# Patient Record
Sex: MALE | Race: WHITE | NOT HISPANIC OR LATINO | Employment: OTHER | URBAN - METROPOLITAN AREA
[De-identification: names, ages, dates, MRNs, and addresses within clinical notes are randomized per-mention and may not be internally consistent; named-entity substitution may affect disease eponyms.]

---

## 2018-04-10 ENCOUNTER — TELEPHONE (OUTPATIENT)
Dept: GASTROENTEROLOGY | Facility: CLINIC | Age: 68
End: 2018-04-10

## 2018-04-10 ENCOUNTER — HOSPITAL ENCOUNTER (INPATIENT)
Facility: HOSPITAL | Age: 68
LOS: 9 days | Discharge: HOSPICE | DRG: 374 | End: 2018-04-19
Attending: EMERGENCY MEDICINE | Admitting: INTERNAL MEDICINE
Payer: MEDICARE

## 2018-04-10 DIAGNOSIS — I10 HYPERTENSION, UNSPECIFIED TYPE: ICD-10-CM

## 2018-04-10 DIAGNOSIS — R53.1 WEAKNESS: ICD-10-CM

## 2018-04-10 DIAGNOSIS — I47.10 SVT (SUPRAVENTRICULAR TACHYCARDIA): ICD-10-CM

## 2018-04-10 DIAGNOSIS — Z94.0 S/P LIVING-DONOR KIDNEY TRANSPLANTATION: ICD-10-CM

## 2018-04-10 DIAGNOSIS — E86.0 DEHYDRATION: ICD-10-CM

## 2018-04-10 DIAGNOSIS — I48.91 ATRIAL FIBRILLATION: ICD-10-CM

## 2018-04-10 DIAGNOSIS — I48.0 PAROXYSMAL ATRIAL FIBRILLATION: ICD-10-CM

## 2018-04-10 DIAGNOSIS — R60.0 BILATERAL EDEMA OF LOWER EXTREMITY: ICD-10-CM

## 2018-04-10 DIAGNOSIS — I48.91 A-FIB: ICD-10-CM

## 2018-04-10 DIAGNOSIS — I35.9 NONRHEUMATIC AORTIC VALVE DISORDER: ICD-10-CM

## 2018-04-10 DIAGNOSIS — C15.9 MALIGNANT NEOPLASM OF ESOPHAGUS, UNSPECIFIED LOCATION: Primary | ICD-10-CM

## 2018-04-10 DIAGNOSIS — I49.9 ABNORMAL HEART RHYTHM: ICD-10-CM

## 2018-04-10 DIAGNOSIS — K22.2 ESOPHAGEAL OBSTRUCTION: Primary | ICD-10-CM

## 2018-04-10 DIAGNOSIS — R00.0 TACHYCARDIA: ICD-10-CM

## 2018-04-10 DIAGNOSIS — Z29.89 PROPHYLACTIC IMMUNOTHERAPY: ICD-10-CM

## 2018-04-10 DIAGNOSIS — Z94.0 S/P KIDNEY TRANSPLANT: ICD-10-CM

## 2018-04-10 DIAGNOSIS — Z86.718 HISTORY OF DVT (DEEP VEIN THROMBOSIS): ICD-10-CM

## 2018-04-10 DIAGNOSIS — R10.9 ABDOMINAL PAIN, UNSPECIFIED ABDOMINAL LOCATION: ICD-10-CM

## 2018-04-10 DIAGNOSIS — K22.4 ESOPHAGEAL SPASM: ICD-10-CM

## 2018-04-10 DIAGNOSIS — R10.9 ABDOMINAL PAIN: ICD-10-CM

## 2018-04-10 DIAGNOSIS — E87.6 HYPOKALEMIA: ICD-10-CM

## 2018-04-10 DIAGNOSIS — E83.39 HYPOPHOSPHATEMIA: ICD-10-CM

## 2018-04-10 DIAGNOSIS — N18.2 CHRONIC KIDNEY DISEASE (CKD), STAGE II (MILD): Chronic | ICD-10-CM

## 2018-04-10 DIAGNOSIS — C16.9 GASTRIC CARCINOMA: ICD-10-CM

## 2018-04-10 DIAGNOSIS — I10 ESSENTIAL HYPERTENSION: ICD-10-CM

## 2018-04-10 DIAGNOSIS — E83.42 HYPOMAGNESEMIA: ICD-10-CM

## 2018-04-10 LAB
ALBUMIN SERPL BCP-MCNC: 2.9 G/DL
ALP SERPL-CCNC: 47 U/L
ALT SERPL W/O P-5'-P-CCNC: 16 U/L
AMYLASE SERPL-CCNC: 37 U/L
ANION GAP SERPL CALC-SCNC: 16 MMOL/L
AST SERPL-CCNC: 25 U/L
BASOPHILS # BLD AUTO: 0.02 K/UL
BASOPHILS NFR BLD: 0.4 %
BILIRUB SERPL-MCNC: 1.5 MG/DL
BNP SERPL-MCNC: 65 PG/ML
BUN SERPL-MCNC: 14 MG/DL
CALCIUM SERPL-MCNC: 9 MG/DL
CHLORIDE SERPL-SCNC: 98 MMOL/L
CO2 SERPL-SCNC: 24 MMOL/L
CREAT SERPL-MCNC: 0.9 MG/DL
DIFFERENTIAL METHOD: ABNORMAL
EOSINOPHIL # BLD AUTO: 0 K/UL
EOSINOPHIL NFR BLD: 0.2 %
ERYTHROCYTE [DISTWIDTH] IN BLOOD BY AUTOMATED COUNT: 17.7 %
EST. GFR  (AFRICAN AMERICAN): >60 ML/MIN/1.73 M^2
EST. GFR  (NON AFRICAN AMERICAN): >60 ML/MIN/1.73 M^2
GLUCOSE SERPL-MCNC: 111 MG/DL
HCT VFR BLD AUTO: 33.1 %
HGB BLD-MCNC: 11.2 G/DL
IMM GRANULOCYTES # BLD AUTO: 0.05 K/UL
IMM GRANULOCYTES NFR BLD AUTO: 1 %
INR PPP: 1.1
LACTATE SERPL-SCNC: 2.1 MMOL/L
LIPASE SERPL-CCNC: 43 U/L
LYMPHOCYTES # BLD AUTO: 0.9 K/UL
LYMPHOCYTES NFR BLD: 17.1 %
MCH RBC QN AUTO: 31.8 PG
MCHC RBC AUTO-ENTMCNC: 33.8 G/DL
MCV RBC AUTO: 94 FL
MONOCYTES # BLD AUTO: 0.1 K/UL
MONOCYTES NFR BLD: 2.5 %
NEUTROPHILS # BLD AUTO: 4.1 K/UL
NEUTROPHILS NFR BLD: 78.8 %
NRBC BLD-RTO: 1 /100 WBC
PLATELET # BLD AUTO: 298 K/UL
PMV BLD AUTO: 9.4 FL
POTASSIUM SERPL-SCNC: 3.8 MMOL/L
PROT SERPL-MCNC: 6.4 G/DL
PROTHROMBIN TIME: 11.7 SEC
RBC # BLD AUTO: 3.52 M/UL
SODIUM SERPL-SCNC: 138 MMOL/L
WBC # BLD AUTO: 5.2 K/UL

## 2018-04-10 PROCEDURE — 80053 COMPREHEN METABOLIC PANEL: CPT

## 2018-04-10 PROCEDURE — 25000003 PHARM REV CODE 250: Performed by: PHYSICIAN ASSISTANT

## 2018-04-10 PROCEDURE — 85610 PROTHROMBIN TIME: CPT

## 2018-04-10 PROCEDURE — 96374 THER/PROPH/DIAG INJ IV PUSH: CPT | Mod: 59

## 2018-04-10 PROCEDURE — 25000003 PHARM REV CODE 250: Performed by: EMERGENCY MEDICINE

## 2018-04-10 PROCEDURE — 11000001 HC ACUTE MED/SURG PRIVATE ROOM

## 2018-04-10 PROCEDURE — 85025 COMPLETE CBC W/AUTO DIFF WBC: CPT

## 2018-04-10 PROCEDURE — 99285 EMERGENCY DEPT VISIT HI MDM: CPT | Mod: 25

## 2018-04-10 PROCEDURE — 83605 ASSAY OF LACTIC ACID: CPT

## 2018-04-10 PROCEDURE — 63600175 PHARM REV CODE 636 W HCPCS: Performed by: PHYSICIAN ASSISTANT

## 2018-04-10 PROCEDURE — 93005 ELECTROCARDIOGRAM TRACING: CPT

## 2018-04-10 PROCEDURE — 83690 ASSAY OF LIPASE: CPT

## 2018-04-10 PROCEDURE — 83880 ASSAY OF NATRIURETIC PEPTIDE: CPT

## 2018-04-10 PROCEDURE — 93010 ELECTROCARDIOGRAM REPORT: CPT | Mod: ,,, | Performed by: INTERNAL MEDICINE

## 2018-04-10 PROCEDURE — 96361 HYDRATE IV INFUSION ADD-ON: CPT

## 2018-04-10 PROCEDURE — 99223 1ST HOSP IP/OBS HIGH 75: CPT | Mod: AI,,, | Performed by: PHYSICIAN ASSISTANT

## 2018-04-10 PROCEDURE — C9113 INJ PANTOPRAZOLE SODIUM, VIA: HCPCS | Performed by: PHYSICIAN ASSISTANT

## 2018-04-10 PROCEDURE — 87040 BLOOD CULTURE FOR BACTERIA: CPT | Mod: 59

## 2018-04-10 PROCEDURE — 82150 ASSAY OF AMYLASE: CPT

## 2018-04-10 RX ORDER — DEXTROSE MONOHYDRATE, SODIUM CHLORIDE, AND POTASSIUM CHLORIDE 50; .745; 4.5 G/1000ML; G/1000ML; G/1000ML
1000 INJECTION, SOLUTION INTRAVENOUS
Status: COMPLETED | OUTPATIENT
Start: 2018-04-10 | End: 2018-04-10

## 2018-04-10 RX ORDER — IBUPROFEN 200 MG
24 TABLET ORAL
Status: DISCONTINUED | OUTPATIENT
Start: 2018-04-10 | End: 2018-04-19 | Stop reason: HOSPADM

## 2018-04-10 RX ORDER — IBUPROFEN 200 MG
16 TABLET ORAL
Status: DISCONTINUED | OUTPATIENT
Start: 2018-04-10 | End: 2018-04-19 | Stop reason: HOSPADM

## 2018-04-10 RX ORDER — PROMETHAZINE HYDROCHLORIDE 12.5 MG/1
12.5 TABLET ORAL EVERY 6 HOURS PRN
COMMUNITY

## 2018-04-10 RX ORDER — KETOROLAC TROMETHAMINE 30 MG/ML
15 INJECTION, SOLUTION INTRAMUSCULAR; INTRAVENOUS EVERY 6 HOURS PRN
Status: DISCONTINUED | OUTPATIENT
Start: 2018-04-10 | End: 2018-04-10

## 2018-04-10 RX ORDER — AZATHIOPRINE 50 MG/1
50 TABLET ORAL DAILY
COMMUNITY

## 2018-04-10 RX ORDER — IPRATROPIUM BROMIDE AND ALBUTEROL SULFATE 2.5; .5 MG/3ML; MG/3ML
3 SOLUTION RESPIRATORY (INHALATION) EVERY 4 HOURS PRN
Status: DISCONTINUED | OUTPATIENT
Start: 2018-04-10 | End: 2018-04-19 | Stop reason: HOSPADM

## 2018-04-10 RX ORDER — DILTIAZEM HYDROCHLORIDE 60 MG/1
60 TABLET, FILM COATED ORAL 3 TIMES DAILY
COMMUNITY

## 2018-04-10 RX ORDER — ONDANSETRON 4 MG/1
4 TABLET, ORALLY DISINTEGRATING ORAL
Status: COMPLETED | OUTPATIENT
Start: 2018-04-10 | End: 2018-04-10

## 2018-04-10 RX ORDER — ACETAMINOPHEN 650 MG/1
650 SUPPOSITORY RECTAL EVERY 4 HOURS PRN
Status: DISCONTINUED | OUTPATIENT
Start: 2018-04-10 | End: 2018-04-19 | Stop reason: HOSPADM

## 2018-04-10 RX ORDER — ENOXAPARIN SODIUM 100 MG/ML
40 INJECTION SUBCUTANEOUS EVERY 24 HOURS
Status: DISCONTINUED | OUTPATIENT
Start: 2018-04-11 | End: 2018-04-12

## 2018-04-10 RX ORDER — PREDNISONE 5 MG/1
5 TABLET ORAL DAILY
COMMUNITY

## 2018-04-10 RX ORDER — ONDANSETRON 4 MG/1
4 TABLET, ORALLY DISINTEGRATING ORAL EVERY 8 HOURS PRN
Status: DISCONTINUED | OUTPATIENT
Start: 2018-04-10 | End: 2018-04-19 | Stop reason: HOSPADM

## 2018-04-10 RX ORDER — PANTOPRAZOLE SODIUM 40 MG/1
40 TABLET, DELAYED RELEASE ORAL 2 TIMES DAILY
COMMUNITY

## 2018-04-10 RX ORDER — AMOXICILLIN 250 MG
1 CAPSULE ORAL 2 TIMES DAILY PRN
Status: DISCONTINUED | OUTPATIENT
Start: 2018-04-10 | End: 2018-04-19 | Stop reason: HOSPADM

## 2018-04-10 RX ORDER — SODIUM CHLORIDE, SODIUM LACTATE, POTASSIUM CHLORIDE, CALCIUM CHLORIDE 600; 310; 30; 20 MG/100ML; MG/100ML; MG/100ML; MG/100ML
INJECTION, SOLUTION INTRAVENOUS CONTINUOUS
Status: ACTIVE | OUTPATIENT
Start: 2018-04-11 | End: 2018-04-11

## 2018-04-10 RX ORDER — GLUCAGON 1 MG
1 KIT INJECTION
Status: DISCONTINUED | OUTPATIENT
Start: 2018-04-10 | End: 2018-04-19 | Stop reason: HOSPADM

## 2018-04-10 RX ORDER — SODIUM CHLORIDE 0.9 % (FLUSH) 0.9 %
5 SYRINGE (ML) INJECTION
Status: DISCONTINUED | OUTPATIENT
Start: 2018-04-10 | End: 2018-04-19 | Stop reason: HOSPADM

## 2018-04-10 RX ORDER — LOSARTAN POTASSIUM 50 MG/1
50 TABLET ORAL DAILY
COMMUNITY

## 2018-04-10 RX ADMIN — DEXTROSE 40 MG: 50 INJECTION, SOLUTION INTRAVENOUS at 10:04

## 2018-04-10 RX ADMIN — SODIUM CHLORIDE 1000 ML: 0.9 INJECTION, SOLUTION INTRAVENOUS at 08:04

## 2018-04-10 RX ADMIN — DEXTROSE MONOHYDRATE, SODIUM CHLORIDE, AND POTASSIUM CHLORIDE 1000 ML: 50; 4.5; .745 INJECTION, SOLUTION INTRAVENOUS at 10:04

## 2018-04-10 RX ADMIN — ONDANSETRON 4 MG: 4 TABLET, ORALLY DISINTEGRATING ORAL at 08:04

## 2018-04-10 NOTE — TELEPHONE ENCOUNTER
Message   Received: Today   Message Contents   MD Kyung Mahajan MA   Caller: Unspecified (Today,  1:40 PM)             Patient need EGD/EUS for gastric cancer.   Jean Mireles MD      Please sign order

## 2018-04-10 NOTE — TELEPHONE ENCOUNTER
Spoke with patient. He says he is dehydrated and weak. He spoke with his referring MD and was told to come to the ER here. He is on his way in now

## 2018-04-11 ENCOUNTER — ANESTHESIA EVENT (OUTPATIENT)
Dept: ENDOSCOPY | Facility: HOSPITAL | Age: 68
DRG: 374 | End: 2018-04-11
Payer: MEDICARE

## 2018-04-11 PROBLEM — Z94.0 S/P KIDNEY TRANSPLANT: Status: ACTIVE | Noted: 2018-04-11

## 2018-04-11 PROBLEM — Z29.89 PROPHYLACTIC IMMUNOTHERAPY: Status: ACTIVE | Noted: 2018-04-11

## 2018-04-11 PROBLEM — C16.9 GASTRIC CARCINOMA: Status: ACTIVE | Noted: 2018-04-11

## 2018-04-11 PROBLEM — E87.6 HYPOKALEMIA: Status: ACTIVE | Noted: 2018-04-11

## 2018-04-11 PROBLEM — R53.1 WEAKNESS: Status: ACTIVE | Noted: 2018-04-11

## 2018-04-11 PROBLEM — I82.4Y9 ACUTE DEEP VEIN THROMBOSIS (DVT) OF PROXIMAL VEIN OF LOWER EXTREMITY: Status: ACTIVE | Noted: 2018-04-11

## 2018-04-11 PROBLEM — K22.4 ESOPHAGEAL SPASM: Status: ACTIVE | Noted: 2018-04-11

## 2018-04-11 PROBLEM — I10 HYPERTENSION: Status: ACTIVE | Noted: 2018-04-11

## 2018-04-11 PROBLEM — E83.42 HYPOMAGNESEMIA: Status: ACTIVE | Noted: 2018-04-11

## 2018-04-11 PROBLEM — N18.2 CHRONIC KIDNEY DISEASE (CKD), STAGE II (MILD): Chronic | Status: ACTIVE | Noted: 2018-04-11

## 2018-04-11 PROBLEM — Z86.718 HISTORY OF DVT (DEEP VEIN THROMBOSIS): Status: ACTIVE | Noted: 2018-04-11

## 2018-04-11 LAB
ANION GAP SERPL CALC-SCNC: 12 MMOL/L
AORTIC VALVE REGURGITATION: ABNORMAL
AORTIC VALVE STENOSIS: ABNORMAL
BACTERIA #/AREA URNS AUTO: ABNORMAL /HPF
BASOPHILS # BLD AUTO: 0.02 K/UL
BASOPHILS NFR BLD: 0.5 %
BILIRUB UR QL STRIP: ABNORMAL
BUN SERPL-MCNC: 14 MG/DL
CALCIUM SERPL-MCNC: 8.3 MG/DL
CHLORIDE SERPL-SCNC: 101 MMOL/L
CLARITY UR REFRACT.AUTO: ABNORMAL
CO2 SERPL-SCNC: 25 MMOL/L
COLOR UR AUTO: ABNORMAL
CREAT SERPL-MCNC: 0.7 MG/DL
DIASTOLIC DYSFUNCTION: NO
DIFFERENTIAL METHOD: ABNORMAL
EOSINOPHIL # BLD AUTO: 0.1 K/UL
EOSINOPHIL NFR BLD: 2.5 %
ERYTHROCYTE [DISTWIDTH] IN BLOOD BY AUTOMATED COUNT: 17.5 %
EST. GFR  (AFRICAN AMERICAN): >60 ML/MIN/1.73 M^2
EST. GFR  (NON AFRICAN AMERICAN): >60 ML/MIN/1.73 M^2
ESTIMATED PA SYSTOLIC PRESSURE: 25
GLOBAL PERICARDIAL EFFUSION: ABNORMAL
GLUCOSE SERPL-MCNC: 97 MG/DL
GLUCOSE UR QL STRIP: ABNORMAL
HCT VFR BLD AUTO: 28.3 %
HGB BLD-MCNC: 9.5 G/DL
HGB UR QL STRIP: NEGATIVE
HYALINE CASTS UR QL AUTO: 0 /LPF
IMM GRANULOCYTES # BLD AUTO: 0.06 K/UL
IMM GRANULOCYTES NFR BLD AUTO: 1.5 %
KETONES UR QL STRIP: ABNORMAL
LEUKOCYTE ESTERASE UR QL STRIP: NEGATIVE
LYMPHOCYTES # BLD AUTO: 1.1 K/UL
LYMPHOCYTES NFR BLD: 27.5 %
MAGNESIUM SERPL-MCNC: 1.3 MG/DL
MCH RBC QN AUTO: 31.8 PG
MCHC RBC AUTO-ENTMCNC: 33.6 G/DL
MCV RBC AUTO: 95 FL
MICROSCOPIC COMMENT: ABNORMAL
MITRAL VALVE MOBILITY: NORMAL
MONOCYTES # BLD AUTO: 0.1 K/UL
MONOCYTES NFR BLD: 2.9 %
NEUTROPHILS # BLD AUTO: 2.7 K/UL
NEUTROPHILS NFR BLD: 65.1 %
NITRITE UR QL STRIP: NEGATIVE
NRBC BLD-RTO: 0 /100 WBC
PH UR STRIP: 6 [PH] (ref 5–8)
PHOSPHATE SERPL-MCNC: 3 MG/DL
PLATELET # BLD AUTO: 196 K/UL
PMV BLD AUTO: 9.4 FL
POCT GLUCOSE: 112 MG/DL (ref 70–110)
POTASSIUM SERPL-SCNC: 2.9 MMOL/L
PREALB SERPL-MCNC: 8 MG/DL
PROT UR QL STRIP: ABNORMAL
RBC # BLD AUTO: 2.99 M/UL
RBC #/AREA URNS AUTO: 1 /HPF (ref 0–4)
RETIRED EF AND QEF - SEE NOTES: 60 (ref 55–65)
SODIUM SERPL-SCNC: 138 MMOL/L
SP GR UR STRIP: 1.02 (ref 1–1.03)
TRICUSPID VALVE REGURGITATION: ABNORMAL
URN SPEC COLLECT METH UR: ABNORMAL
UROBILINOGEN UR STRIP-ACNC: 4 EU/DL
WBC # BLD AUTO: 4.08 K/UL
WBC #/AREA URNS AUTO: 4 /HPF (ref 0–5)

## 2018-04-11 PROCEDURE — 99223 1ST HOSP IP/OBS HIGH 75: CPT | Mod: ,,, | Performed by: INTERNAL MEDICINE

## 2018-04-11 PROCEDURE — 84134 ASSAY OF PREALBUMIN: CPT

## 2018-04-11 PROCEDURE — 80048 BASIC METABOLIC PNL TOTAL CA: CPT

## 2018-04-11 PROCEDURE — 36415 COLL VENOUS BLD VENIPUNCTURE: CPT

## 2018-04-11 PROCEDURE — 93306 TTE W/DOPPLER COMPLETE: CPT

## 2018-04-11 PROCEDURE — 81001 URINALYSIS AUTO W/SCOPE: CPT

## 2018-04-11 PROCEDURE — 63600175 PHARM REV CODE 636 W HCPCS: Performed by: HOSPITALIST

## 2018-04-11 PROCEDURE — 25500020 PHARM REV CODE 255: Performed by: STUDENT IN AN ORGANIZED HEALTH CARE EDUCATION/TRAINING PROGRAM

## 2018-04-11 PROCEDURE — 93306 TTE W/DOPPLER COMPLETE: CPT | Mod: 26,,, | Performed by: INTERNAL MEDICINE

## 2018-04-11 PROCEDURE — 84100 ASSAY OF PHOSPHORUS: CPT

## 2018-04-11 PROCEDURE — 11000001 HC ACUTE MED/SURG PRIVATE ROOM

## 2018-04-11 PROCEDURE — 85025 COMPLETE CBC W/AUTO DIFF WBC: CPT

## 2018-04-11 PROCEDURE — C9113 INJ PANTOPRAZOLE SODIUM, VIA: HCPCS | Performed by: PHYSICIAN ASSISTANT

## 2018-04-11 PROCEDURE — 87086 URINE CULTURE/COLONY COUNT: CPT

## 2018-04-11 PROCEDURE — 99231 SBSQ HOSP IP/OBS SF/LOW 25: CPT | Mod: ,,, | Performed by: NURSE PRACTITIONER

## 2018-04-11 PROCEDURE — 99223 1ST HOSP IP/OBS HIGH 75: CPT | Mod: GC,,, | Performed by: INTERNAL MEDICINE

## 2018-04-11 PROCEDURE — 25000003 PHARM REV CODE 250: Performed by: PHYSICIAN ASSISTANT

## 2018-04-11 PROCEDURE — 99233 SBSQ HOSP IP/OBS HIGH 50: CPT | Mod: ,,, | Performed by: HOSPITALIST

## 2018-04-11 PROCEDURE — 63600175 PHARM REV CODE 636 W HCPCS: Performed by: PHYSICIAN ASSISTANT

## 2018-04-11 PROCEDURE — 25500020 PHARM REV CODE 255: Performed by: INTERNAL MEDICINE

## 2018-04-11 PROCEDURE — 83735 ASSAY OF MAGNESIUM: CPT

## 2018-04-11 PROCEDURE — 25000003 PHARM REV CODE 250: Performed by: HOSPITALIST

## 2018-04-11 RX ORDER — AZATHIOPRINE 50 MG/1
150 TABLET ORAL DAILY
Status: DISCONTINUED | OUTPATIENT
Start: 2018-04-11 | End: 2018-04-19 | Stop reason: HOSPADM

## 2018-04-11 RX ORDER — POTASSIUM CHLORIDE 7.45 MG/ML
10 INJECTION INTRAVENOUS
Status: DISCONTINUED | OUTPATIENT
Start: 2018-04-11 | End: 2018-04-11

## 2018-04-11 RX ORDER — POTASSIUM CHLORIDE 7.45 MG/ML
10 INJECTION INTRAVENOUS
Status: COMPLETED | OUTPATIENT
Start: 2018-04-11 | End: 2018-04-11

## 2018-04-11 RX ORDER — MAGNESIUM SULFATE HEPTAHYDRATE 40 MG/ML
2 INJECTION, SOLUTION INTRAVENOUS ONCE
Status: COMPLETED | OUTPATIENT
Start: 2018-04-11 | End: 2018-04-11

## 2018-04-11 RX ORDER — MORPHINE SULFATE ORAL SOLUTION 10 MG/5ML
5 SOLUTION ORAL EVERY 4 HOURS PRN
Status: DISCONTINUED | OUTPATIENT
Start: 2018-04-11 | End: 2018-04-19 | Stop reason: HOSPADM

## 2018-04-11 RX ORDER — BISACODYL 10 MG
10 SUPPOSITORY, RECTAL RECTAL DAILY PRN
Status: DISCONTINUED | OUTPATIENT
Start: 2018-04-11 | End: 2018-04-19 | Stop reason: HOSPADM

## 2018-04-11 RX ORDER — ACETAMINOPHEN 10 MG/ML
1000 INJECTION, SOLUTION INTRAVENOUS EVERY 12 HOURS
Status: COMPLETED | OUTPATIENT
Start: 2018-04-11 | End: 2018-04-12

## 2018-04-11 RX ADMIN — MORPHINE SULFATE 5 MG: 10 SOLUTION ORAL at 08:04

## 2018-04-11 RX ADMIN — POTASSIUM CHLORIDE 10 MEQ: 7.46 INJECTION, SOLUTION INTRAVENOUS at 08:04

## 2018-04-11 RX ADMIN — POTASSIUM CHLORIDE 10 MEQ: 7.46 INJECTION, SOLUTION INTRAVENOUS at 10:04

## 2018-04-11 RX ADMIN — ONDANSETRON 4 MG: 4 TABLET, ORALLY DISINTEGRATING ORAL at 03:04

## 2018-04-11 RX ADMIN — SODIUM CHLORIDE, SODIUM LACTATE, POTASSIUM CHLORIDE, AND CALCIUM CHLORIDE: 600; 310; 30; 20 INJECTION, SOLUTION INTRAVENOUS at 12:04

## 2018-04-11 RX ADMIN — DEXTROSE 40 MG: 50 INJECTION, SOLUTION INTRAVENOUS at 09:04

## 2018-04-11 RX ADMIN — DEXTROSE 40 MG: 50 INJECTION, SOLUTION INTRAVENOUS at 10:04

## 2018-04-11 RX ADMIN — METHYLPREDNISOLONE SODIUM SUCCINATE 10 MG: 40 INJECTION, POWDER, FOR SOLUTION INTRAMUSCULAR; INTRAVENOUS at 09:04

## 2018-04-11 RX ADMIN — IOHEXOL 15 ML: 350 INJECTION, SOLUTION INTRAVENOUS at 12:04

## 2018-04-11 RX ADMIN — POTASSIUM CHLORIDE 10 MEQ: 7.46 INJECTION, SOLUTION INTRAVENOUS at 11:04

## 2018-04-11 RX ADMIN — ACETAMINOPHEN 1000 MG: 10 INJECTION, SOLUTION INTRAVENOUS at 10:04

## 2018-04-11 RX ADMIN — IOHEXOL 100 ML: 350 INJECTION, SOLUTION INTRAVENOUS at 02:04

## 2018-04-11 RX ADMIN — MAGNESIUM SULFATE IN WATER 2 G: 40 INJECTION, SOLUTION INTRAVENOUS at 01:04

## 2018-04-11 RX ADMIN — AZATHIOPRINE 150 MG: 50 TABLET ORAL at 08:04

## 2018-04-11 NOTE — SUBJECTIVE & OBJECTIVE
Past Medical History:   Diagnosis Date    Cancer     stomach    Hypertension     Renal disorder 1984    bilat kidney transplant        Past Surgical History:   Procedure Laterality Date    APPENDECTOMY      w cancer tumor removal    CHOLECYSTECTOMY      COLON SURGERY      removed during appendectomy to remove tumors    KIDNEY TRANSPLANT Bilateral 1984       Review of patient's allergies indicates:   Allergen Reactions    Allopurinol analogues      Family History     None        Social History Main Topics    Smoking status: Never Smoker    Smokeless tobacco: Never Used    Alcohol use No    Drug use: No    Sexual activity: No     Review of Systems   Constitutional: Positive for fatigue and unexpected weight change. Negative for chills, diaphoresis and fever.   HENT: Negative for trouble swallowing and voice change.    Respiratory: Positive for choking. Negative for cough and shortness of breath.    Cardiovascular: Negative for chest pain and leg swelling.   Gastrointestinal: Positive for abdominal pain. Negative for abdominal distention, anal bleeding, blood in stool, constipation, diarrhea, nausea, rectal pain and vomiting.   Genitourinary: Negative for dysuria and flank pain.   Musculoskeletal: Negative.    Skin: Negative for color change and pallor.   Neurological: Positive for dizziness and light-headedness. Negative for tremors.   Psychiatric/Behavioral: Negative for confusion and hallucinations.     Objective:     Vital Signs (Most Recent):  Temp: 96.3 °F (35.7 °C) (04/11/18 0430)  Pulse: 71 (04/11/18 0430)  Resp: 16 (04/11/18 0430)  BP: (!) 144/79 (04/11/18 0430)  SpO2: 97 % (04/11/18 0430) Vital Signs (24h Range):  Temp:  [96.3 °F (35.7 °C)-98.1 °F (36.7 °C)] 96.3 °F (35.7 °C)  Pulse:  [71-97] 71  Resp:  [14-18] 16  SpO2:  [97 %-99 %] 97 %  BP: ()/(56-87) 144/79     Weight: 108.4 kg (239 lb) (04/10/18 3448)  Body mass index is 37.43 kg/m².      Intake/Output Summary (Last 24 hours) at  04/11/18 0926  Last data filed at 04/11/18 0452   Gross per 24 hour   Intake             1650 ml   Output              250 ml   Net             1400 ml       Lines/Drains/Airways     Peripheral Intravenous Line                 External Jugular IV 04/10/18 2004 less than 1 day         Peripheral IV - Single Lumen 04/10/18 2003 Left Hand less than 1 day                Physical Exam   Constitutional: He is oriented to person, place, and time. No distress.   Eyes: Conjunctivae are normal. No scleral icterus.   Neck: Neck supple. No tracheal deviation present.   Cardiovascular: Normal rate, regular rhythm and normal heart sounds.    Pulmonary/Chest: Effort normal and breath sounds normal. No respiratory distress.   Abdominal: Soft. Bowel sounds are normal. He exhibits no distension. There is no tenderness.   Neurological: He is alert and oriented to person, place, and time.   Skin: Skin is warm and dry. He is not diaphoretic.   Psychiatric: He has a normal mood and affect.       Significant Labs:  CBC:   Recent Labs  Lab 04/10/18  2005 04/11/18  0516   WBC 5.20 4.08   HGB 11.2* 9.5*   HCT 33.1* 28.3*    196     CMP:   Recent Labs  Lab 04/10/18  2005 04/11/18  0516   * 97   CALCIUM 9.0 8.3*   ALBUMIN 2.9*  --    PROT 6.4  --     138   K 3.8 2.9*   CO2 24 25   CL 98 101   BUN 14 14   CREATININE 0.9 0.7   ALKPHOS 47*  --    ALT 16  --    AST 25  --    BILITOT 1.5*  --      Coagulation:   Recent Labs  Lab 04/10/18  2005   INR 1.1       Significant Imaging:  Imaging results within the past 24 hours have been reviewed.

## 2018-04-11 NOTE — MEDICAL/APP STUDENT
04/11/2018    Progress Note  [facility] Medicine                    04/11/2018      Admit Date: 4/10/2018             LOS: 1 day     SUBJECTIVE:     Follow-up For: Esophageal obstruction    HPI/Interval history (See H&P for complete P,F,SHx) :   This is a 67 y.o. male.   This is a 67 y.o. male with medical problems including HTN, DVT/PE (IVC filter), renal disorder, and history of kidney transplant who presents with complaint of abdominal pain, fatigue, weakness, dizziness and esophageal obstruction. Pt has been having difficulty tolerating PO intake since January. He has been in an out of the hospital multiple times over the past 3 months for similar issue.  Pt was told he has esophageal spasms and was esophagus is in a corkscrew fashion. Last week pt had EGD and biopsies taken at that appointment showed stomach cancer. Pt was discharged on Sunday and told to follow up with oncologist at [facility] and have general surgery see him, despite still unable to tolerate PO intake. Family reports he had not been able to keeps down any food or liquids or swallow his saliva since discharge. They state liquids stay down for a few minutes before he vomits it back up.  Today home health nurse saw the pt and stated they should report to the ED due to the pt's condition. Pt denies hematemesis, hematuria, and blood in stool.       Of note, patient does report urinary incontinence x3 weeks, chronic BLE edema, chronic SOB/MURO, easy fatigability.       In ED, given 1L NS, 1L D51/2 NS.  CXR clear, BNP 65.  Albumin 2.5. Lactate 2.1 HDS, 99% on RA.       Internal History  04/11 - [facility]: Patient not able to keep food or liquid down.    Review of Systems:  Constitutional-  Negative for Fever,chills,  Weakness  Neuro-  Negative for Confusion, focal weakness  CV-  Negative for Chest Pain, Palpitations  Resp-  Negative for Cough, SOB  GI-  + Nausea, Vomiting,  No Diarrhea  Extrem-  Negative for Pain, Swelling  % Meals-  npo  # BM-    "none      I & O (Last 24H):  Intake/Output Summary (Last 24 hours) at 04/11/18 1119  Last data filed at 04/11/18 0452   Gross per 24 hour   Intake             1650 ml   Output              250 ml   Net             1400 ml       Subjective pain scale: 0  Observed (smile) scale:0    OBJECTIVE:       Vitals:    04/10/18 2218 04/10/18 2358 04/11/18 0430 04/11/18 0800   BP: (!) 150/87 (!) 143/81 (!) 144/79 (!) 178/85   BP Location:  Right arm Right arm Right arm   Patient Position:  Lying Lying Lying   Pulse: 84 75 71 80   Resp: 18 16 16 16   Temp:  98.1 °F (36.7 °C) 96.3 °F (35.7 °C) 97.6 °F (36.4 °C)   TempSrc:  Oral Oral Oral   SpO2: 98% 99% 97% 97%   Weight:  108.4 kg (239 lb)     Height:  5' 7" (1.702 m)         Physical Exam:  General:  Well developed, well nourished in NAD, obese  Mental status-  oriented time 3, linear thought process, + focused attention, alert and awake  HEENT:  Conjunctiva clear; Oropharynx clear, moist membranes  Neck:  No JVD noted, Supple, full range of motion  CV-  Normal rate,  S1, S2 with no murmurs,gallops,rubs  Resp-  Lungs CTA Bilaterally, Unlabored, good effort  Abdomen-  Nontender, no rebound, no guarding,  nondistended, +  BS, soft, no hepatosplenomegaly  Extrem-  No cyanosis, clubbing, edema.  Skin-   No rashes, lesions, ulcers, no third spacing dependent edema    Continuous Infusions:  Scheduled Meds:   azaTHIOprine  150 mg Oral Daily    enoxaparin  40 mg Subcutaneous Daily    magnesium sulfate IVPB  2 g Intravenous Once    methylPREDNISolone sodium succinate  10 mg Intravenous Daily    pantoprazole 40 mg in dextrose 5 % 100 mL infusion (ready to mix system)  40 mg Intravenous BID    potassium chloride  10 mEq Intravenous Q1H     PRN Meds:acetaminophen, albuterol-ipratropium 2.5mg-0.5mg/3mL, bisacodyl, dextrose 50%, dextrose 50%, glucagon (human recombinant), glucose, glucose, ondansetron, promethazine (PHENERGAN) IVPB, senna-docusate 8.6-50 mg, sodium chloride " 0.9%    Chemistry:  Recent Labs      04/10/18   2005  04/11/18   0516   NA  138  138   K  3.8  2.9*   CL  98  101   CO2  24  25   BUN  14  14   CREATININE  0.9  0.7   CALCIUM  9.0  8.3*     Recent Labs      04/10/18   2005   AST  25   ALT  16   ALKPHOS  47*   ALBUMIN  2.9*   PROT  6.4   BILITOT  1.5*         CBC:  Recent Labs  Lab 04/10/18  2005 04/11/18  0516   WBC 5.20 4.08   HGB 11.2* 9.5*   HCT 33.1* 28.3*    196   MONO 2.5*  0.1* 2.9*  0.1*         ASSESSMENT/PLAN:     * Esophageal obstruction     Esophageal spasm  Gastric carcnoma  - AES consult for EGD/EUS for gastric cancer  - Surgical oncology consult and appreciate recs  - Will need documentation from OSH  - Unable to tolerate saliva but protecting airway.  - Strict NPO, aspiration precautions  - Not currently on anticoagulation- will need to be restarted on this admission  - LR mIVF x12 hours  - 4/11 - [facility]: NPO for procedure/surgery and aspiration concern, IV hydration and trend labs for electrolytes . CT thorax/abdomen/pelvis w/IV contrast for staging  EUS tomorrow pending CT results  SLP consult for recs re: meds/diet   Surg Onc consultation - paged 12 noon            S/P kidney transplant (Stable)     One functioning transplanted kidney  - KTM consult  - Avoid nephrotoxic agents, including contrast  - will defer abdominal imaging decision for cancer to KTM/surg-onc team  - Cr. 0.9, lytes WNL  - Mag/Phos   - Will change prednisone 10 mg PO daily to soludmedrol 10 mg IV daily while unable to tolerate PO  - Wife able to give patient imuran 150 mg PO daily crushed up in pudding at home.  No IV available, per pharmacy. Continue PO Imuran as long as patient can tolerate.   4/11 - maumus: Stable functioning kidney Cr .7 BUN 14, continue PO Imuran as long as tolerated.          History of DVT (deep vein thrombosis)     H/o DVT and PE  - Currently not on AC.  Was on warfarin for years, but switched to eliquis.  Eliquis recently stopped d/t inability  to tolerate PO.  Will need to be restarted on eliquis after EGD/surgical intreventions. Patient does not know dosing.   - IVC filter currently in place  - Currently in hypercoagulable state  4/11 - [facility]: Continue treatment with eliquis after intervention with swallowing difficulty.        Dehydration     2/2 esophageal obstruction  - IVF replacement  4/11 - [facility]: Continue IV hydration       Bilateral edema of lower extremity     Chronic  BNP 65 in setting of obesity  - will order 2D ECHO  - no erythema, swelling appears equal         Weakness     PT/OT consult  4/11 - [facility]: PT/OT consult when ready from intervention of esophageal spasm and gastric cancer       Hypertension     Stable  - Continue losartan 50 mg PO daily, cardizem 60 mg PO TID when able to tolerate PO       Hypokalemia - replace            I personally scribed for Dwight Ware MD on 04/11/2018 at 11:32 AM. Electronically signed by konstantin Thompson III on 04/11/2018 at 11:32 AM

## 2018-04-11 NOTE — ASSESSMENT & PLAN NOTE
Recent biopsies positive for adenocarcinoma of the gastric body.  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    Recommendations:  CT thorax/abdomen/pelvis w/IV contrast for staging  EUS tomorrow pending CT results  Keep NPO@midnight  SLP consult for recs re: meds/diet   Surg Onc consultation

## 2018-04-11 NOTE — PLAN OF CARE
04/11/18 1339   Discharge Assessment   Assessment Type Discharge Planning Assessment   Confirmed/corrected address and phone number on facesheet? Yes   Assessment information obtained from? Patient;Caregiver   Expected Length of Stay (days) 3   Communicated expected length of stay with patient/caregiver yes   Prior to hospitilization cognitive status: Alert/Oriented   Prior to hospitalization functional status: Assistive Equipment   Current cognitive status: Alert/Oriented   Current Functional Status: Assistive Equipment   Lives With spouse   Able to Return to Prior Arrangements yes   Is patient able to care for self after discharge? Yes   Patient's perception of discharge disposition home health   Equipment Currently Used at Home walker, rolling;cane, straight   Do you have any problems affording any of your prescribed medications? No   Is the patient taking medications as prescribed? yes   Does the patient have transportation home? Yes   Transportation Available car   Discharge Plan A Home Health   Discharge Plan B Home with family   Patient/Family In Agreement With Plan yes   Met with patient and spouse. Patient's spouse reports that home health had just been set up prior to admission with [facility]. [facility] and will need hard copy of any new meds at discharge.

## 2018-04-11 NOTE — ASSESSMENT & PLAN NOTE
-IV repletion since having great difficulty with po. Follow lab closely. Follow Mg level (also low and requires repletion)

## 2018-04-11 NOTE — CONSULTS
[facility]  General Surgery  Consult Note    Inpatient consult to Surgical Oncology  Consult performed by: BASIL QUINTANILLA  Consult ordered by: JAYDEN MOSCOSO        Subjective:     Chief Complaint/Reason for Admission: gastric Ca    History of Present Illness: Mr. Connelly is a 66yo male with a history of DVT/PE previously on warfarin, kidney transplant (1986), HTN, who presents to [facility] ED on 4/10/18 with fatigue, weakness, dizziness, and dysphagia.     The patient states he has had trouble swallowing both liquids and solids since 1/2018.  Reportedly had [facility] the [facility] which was unable to traverse the esophagus.  Recently underwent an EGD on 4/6/18 which showed a dilated proximal esophagus with a distal ring/stricture, diffuse gastric edema/erythema with biopsies positive for gastric adenocarcinoma with infiltration of the lamina propria.       A noncontrasted CT did not show any distant lesions or gastric thickening.     Patient with deconditioning over the past year and is in need of bilateral hip and bilateral knee replacements and was getting prepared for those surgeries until the problem arose with his dysphagia.  Patient walks with a cane and walker and while at home. Mr. Connelly states he is able to walk short distances and does have SOB with exertion.  He is with no c/o SOB at rest. He is with no c/o chest pain. Patient stated he lost 60# since 1/2018.     No current facility-administered medications on file prior to encounter.      No current outpatient prescriptions on file prior to encounter.       Review of patient's allergies indicates:   Allergen Reactions    Allopurinol analogues        Past Medical History:   Diagnosis Date    Cancer     stomach    Hypertension     Renal disorder 1984    bilat kidney transplant      Past Surgical History:   Procedure Laterality Date    APPENDECTOMY      w cancer tumor removal    CHOLECYSTECTOMY      COLON SURGERY      removed during  appendectomy to remove tumors    KIDNEY TRANSPLANT Bilateral 1984     Family History     None        Social History Main Topics    Smoking status: Never Smoker    Smokeless tobacco: Never Used    Alcohol use No    Drug use: No    Sexual activity: No     Review of Systems   Constitutional: Positive for fatigue and unexpected weight change.   HENT: Negative.    Eyes: Negative.    Respiratory: Positive for choking.    Cardiovascular: Negative.    Gastrointestinal: Positive for abdominal pain.   Endocrine: Negative.    Genitourinary: Negative.    Musculoskeletal: Negative.    Skin: Negative.    Allergic/Immunologic: Negative.    Neurological: Positive for dizziness and light-headedness.   Hematological: Negative.    Psychiatric/Behavioral: Negative.      Objective:     Vital Signs (Most Recent):  Temp: 97.6 °F (36.4 °C) (04/11/18 0800)  Pulse: 80 (04/11/18 0800)  Resp: 16 (04/11/18 0800)  BP: (!) 178/85 (04/11/18 0800)  SpO2: 97 % (04/11/18 0800) Vital Signs (24h Range):  Temp:  [96.3 °F (35.7 °C)-98.1 °F (36.7 °C)] 97.6 °F (36.4 °C)  Pulse:  [71-97] 80  Resp:  [14-18] 16  SpO2:  [97 %-99 %] 97 %  BP: ()/(56-87) 178/85     Weight: 108.4 kg (239 lb)  Body mass index is 37.43 kg/m².      Intake/Output Summary (Last 24 hours) at 04/11/18 1115  Last data filed at 04/11/18 0452   Gross per 24 hour   Intake             1650 ml   Output              250 ml   Net             1400 ml       Physical Exam   Constitutional: He is oriented to person, place, and time. He appears well-developed.   HENT:   Head: Normocephalic.   Eyes: Pupils are equal, round, and reactive to light.   Neck: Normal range of motion. Neck supple.   Cardiovascular: Normal rate, regular rhythm, normal heart sounds and intact distal pulses.    Pulmonary/Chest: Effort normal and breath sounds normal.   Abdominal: Soft. Bowel sounds are normal.   Musculoskeletal: Normal range of motion.   Neurological: He is alert and oriented to person, place, and  time.   Skin: Skin is warm and dry.   Psychiatric: He has a normal mood and affect. His behavior is normal. Judgment and thought content normal.   Nursing note and vitals reviewed.      Significant Labs:  Amylase:   Recent Labs  Lab 04/10/18  2005   AMYLASE 37     CBC:   Recent Labs  Lab 04/11/18  0516   WBC 4.08   RBC 2.99*   HGB 9.5*   HCT 28.3*      MCV 95   MCH 31.8*   MCHC 33.6     CMP:   Recent Labs  Lab 04/10/18  2005 04/11/18  0516   * 97   CALCIUM 9.0 8.3*   ALBUMIN 2.9*  --    PROT 6.4  --     138   K 3.8 2.9*   CO2 24 25   CL 98 101   BUN 14 14   CREATININE 0.9 0.7   ALKPHOS 47*  --    ALT 16  --    AST 25  --    BILITOT 1.5*  --      Coagulation:   Recent Labs  Lab 04/10/18  2005   INR 1.1     Lactic Acid:   Recent Labs  Lab 04/10/18  2005   LACTATE 2.1     LFTs:   Recent Labs  Lab 04/10/18  2005   ALT 16   AST 25   ALKPHOS 47*   BILITOT 1.5*   PROT 6.4   ALBUMIN 2.9*     Lipase:   Recent Labs  Lab 04/10/18  2005   LIPASE 43       Significant Diagnostics:  I have reviewed all pertinent imaging results/findings within the past 24 hours.    Assessment/Plan:     Active Diagnoses:    Diagnosis Date Noted POA    PRINCIPAL PROBLEM:  Esophageal obstruction [K22.2] 04/10/2018 Yes    Gastric carcinoma [C16.9] 04/11/2018 Unknown    Weakness [R53.1] 04/11/2018 Unknown    Esophageal spasm [K22.4] 04/11/2018 Unknown    History of DVT (deep vein thrombosis) [Z86.718] 04/11/2018 Not Applicable    S/P kidney transplant [Z94.0] 04/11/2018 Not Applicable    Hypertension [I10] 04/11/2018 Unknown    Bilateral edema of lower extremity [R60.0] 04/10/2018 Unknown    Dehydration [E86.0] 04/10/2018 Unknown      Problems Resolved During this Admission:    Diagnosis Date Noted Date Resolved POA     Mr. oCnnelly is a 68 y/o male with recent biopsies positive for adenocarcinoma of the gastric body.    Plan:  - CT abdomen and pelvis with PO/IV contrast and CT chest non-contrast (ordered).  - pre-albumin.   Possible TPN.  - PT/OT recommendations.  - Deconditioning  - F/U EUS results tomorrow.   - Will discuss above with Surgical Oncology staff for possible surgical intervention.       Thank you for your consult. I will follow-up with patient. Please contact us if you have any additional questions.    Jael Luu NP  General Surgery  [facility]    Patient is not a candidate for aggressive multimodality therapy including surgical resection, in view of his severe co-morbidities.

## 2018-04-11 NOTE — ED PROVIDER NOTES
Encounter Date: 4/10/2018    SCRIBE #1 NOTE: I, Gala Solorzano, am scribing for, and in the presence of,  Dr. Manzano . I have scribed the following portions of the note - Other sections scribed: HPI, ROS, PE.       History     Chief Complaint   Patient presents with    Abdominal Pain     Pt states that he has stomach cancer and sees Dr. Magallon here. Pt was seen at [facility] this past week and was advised to come here; discharge was sunday. He states abd pain, weakness, dizziness, N/V and unable to keep fluids down.     Fatigue     Time seen by provider: 7:47 PM    This is a 67 y.o. male with history of kidney transplant who presents with complaint of abdominal pain, fatigue, and vomiting. Pt has been having difficulty swallowing and keeping food and liquids down since January. He has been in an out of the hospital multiple times and was told his esophagus spasmed and stuck in a corkscrew fashion. Last week pt had procedure to open his esophagus and biopsies taken at that appointment showed stomach cancer. Pt was discharged on Sunday and told to follow up with his oncologist at [facility]. Family reports he had not been able to keeps down any food or liquids or swallow his saliva since discharge. They state liquids stays down for a few minutes before he vomits it back up. He is receiving fluids and nutrition through IV. Today home health nurse saw the pt and stated they should report to the ED due to the pt's condition. Pt denies hematemesis, hematuria, and blood in stool.       The history is provided by the patient and medical records.     Review of patient's allergies indicates:  Allergies not on file  No past medical history on file.  No past surgical history on file.  No family history on file.  Social History   Substance Use Topics    Smoking status: Not on file    Smokeless tobacco: Not on file    Alcohol use Not on file     Review of Systems   Constitutional: Positive for fatigue.   HENT: Positive  for trouble swallowing.    Gastrointestinal: Positive for abdominal pain and vomiting. Negative for blood in stool.        Negative for hematemesis.    Genitourinary: Negative for hematuria.   All other systems reviewed and are negative.      Physical Exam     Initial Vitals [04/10/18 1930]   BP Pulse Resp Temp SpO2   (!) 95/56 97 18 97.8 °F (36.6 °C) 99 %      MAP       69         Physical Exam    Nursing note and vitals reviewed.  Constitutional: He appears well-developed and well-nourished. No distress.   HENT:   Head: Normocephalic and atraumatic.   Tacky, sticky oral mucosa.    Eyes:   Mild periorbital icterus. Mild conjunctival pallor.    Neck: Normal range of motion. Neck supple.   Cardiovascular: Normal rate, regular rhythm and normal heart sounds.   Pulmonary/Chest: Breath sounds normal. No respiratory distress.   Abdominal: Soft. Bowel sounds are normal. He exhibits no distension. There is no tenderness.   Musculoskeletal: Normal range of motion.   Soft 3+ pitting edema to bilateral lower extremities.    Neurological: He is alert and oriented to person, place, and time. He has normal strength. No sensory deficit.   Skin: Capillary refill takes less than 2 seconds.   Poor skin turgor.   Superficial bruising noted to abdominal wall.   Right EJ placed by me due to poor access.    Psychiatric: He has a normal mood and affect.         ED Course   Procedures  Labs Reviewed   CULTURE, BLOOD   CULTURE, BLOOD   CULTURE, URINE   CBC W/ AUTO DIFFERENTIAL   COMPREHENSIVE METABOLIC PANEL   LACTIC ACID, PLASMA   URINALYSIS   PROTIME-INR   AMYLASE   LIPASE             Medical Decision Making:   History:   Old Medical Records: I decided to obtain old medical records.  Clinical Tests:   Lab Tests: Ordered and Reviewed  Radiological Study: Ordered and Reviewed  Medical Tests: Ordered and Reviewed            Scribe Attestation:   Scribe #1: I performed the above scribed service and the documentation accurately describes the  services I performed. I attest to the accuracy of the note.               Clinical Impression:   Diagnoses of Abdominal pain and Abdominal pain were pertinent to this visit.

## 2018-04-11 NOTE — HPI
"Elbert is a 68 yo WM s/p living donor kidney transplant from sister in 1982 (Dr. Sow at [facility], placed in [facility]) admitted for N/V and newly diagnosed gastric cancer for multidisciplinary GI and surgical oncology eval.  He reports no issues of rejection or other txp complications. He has been maintained on prednisone 10 mg daily and azathioprine 150 mg daily. (He reports taking cyclosporine for only a short period after txp).  He states he has been able to keep his IS down by crushing meds in applesauce, but cannot eat much; wife states liquids stay down for a few minutes before he vomits it back up. From kidney standpoint, he reports no past  issues except remote UTI "years ago," but he started with urinary incontinence in very recent past. He denies pain over allograft, frequency or urgency.    He states his current illness began in January when he started with vomiting. He states he has lost 60#s since then, which he attributes to difficulty tolerating PO intake. He notes he has been in an out of the hospital multiple times over the past 3 months for similar issue.  Last week he states and EGD w/ biopsies showed stomach cancer.  "

## 2018-04-11 NOTE — ASSESSMENT & PLAN NOTE
-Hypoalbuminemia and immobility contributing factors. Diurese if OK with primary team with close lab f/u.

## 2018-04-11 NOTE — ED TRIAGE NOTES
To ED w wife and being dx w stomach cancer yesterday. Was seen by home health nurse today and advised to come to Ed. Pt has been having intermittent, abd pain over all quads, nausea, and vomiting. Has not been able to tolerate anything PO for the last week. Pt has also had issues w his esophagus and has had difficulty swallowing

## 2018-04-11 NOTE — PLAN OF CARE
Problem: Patient Care Overview  Goal: Plan of Care Review  Outcome: Ongoing (interventions implemented as appropriate)  Pt in bed resting. Denies c/o pain or nausea.  In no acute distress. Activity promoted. No falls noted. Will continue to monitor. Call bell intact and in reach.

## 2018-04-11 NOTE — H&P
[facility] Hospital Medicine  History & Physical    Patient Name: Elbert Connelly  MRN: [id number]  Admission Date: 4/10/2018  Attending Physician: Dwight Ware MD   Primary Care Provider: Primary Doctor [facility] Medicine Team: [facility] HOSP MED B Peter Simons PA-C     Patient information was obtained from patient and ER records.     Subjective:     Principal Problem:Esophageal obstruction    Chief Complaint:   Chief Complaint   Patient presents with    Abdominal Pain     Pt states that he has stomach cancer and sees Dr. Magallon here. Pt was seen at [facility] this past week and was advised to come here; discharge was sunday. He states abd pain, weakness, dizziness, N/V and unable to keep fluids down.     Fatigue        HPI: This is a 67 y.o. male with medical problems including HTN, DVT/PE (IVC filter), renal disorder, and history of kidney transplant who presents with complaint of abdominal pain, fatigue, weakness, dizziness and esophageal obstruction. Pt has been having difficulty tolerating PO intake since January. He has been in an out of the hospital multiple times over the past 3 months for similar issue.  Pt was told he has esophageal spasms and was esophagus is in a corkscrew fashion. Last week pt had EGD and biopsies taken at that appointment showed stomach cancer. Pt was discharged on Sunday and told to follow up with oncologist at [facility] and have general surgery see him, despite still unable to tolerate PO intake. Family reports he had not been able to keeps down any food or liquids or swallow his saliva since discharge. They state liquids stay down for a few minutes before he vomits it back up.  Today home health nurse saw the pt and stated they should report to the ED due to the pt's condition. Pt denies hematemesis, hematuria, and blood in stool.      Of note, patient does report urinary incontinence x3 weeks, chronic BLE edema, chronic SOB/MURO, easy fatigability.       In ED, given 1L NS, 1L D51/2 NS.  CXR clear, BNP 65.  Albumin 2.5. Lactate 2.1 HDS, 99% on RA.      Past Medical History:   Diagnosis Date    Cancer     stomach    Hypertension     Renal disorder 1984    bilat kidney transplant        Past Surgical History:   Procedure Laterality Date    APPENDECTOMY      w cancer tumor removal    CHOLECYSTECTOMY      COLON SURGERY      removed during appendectomy to remove tumors    KIDNEY TRANSPLANT Bilateral 1984       Review of patient's allergies indicates:   Allergen Reactions    Allopurinol analogues        No current facility-administered medications on file prior to encounter.      No current outpatient prescriptions on file prior to encounter.     Family History     None        Social History Main Topics    Smoking status: Never Smoker    Smokeless tobacco: Never Used    Alcohol use No    Drug use: No    Sexual activity: No     Review of Systems   Constitutional: Positive for activity change, appetite change, fatigue and unexpected weight change (60 lb wt loss in 2.5 months). Negative for chills, diaphoresis and fever.   HENT: Negative for congestion and rhinorrhea.    Eyes: Negative for photophobia and visual disturbance.   Respiratory: Positive for shortness of breath (chronic). Negative for cough and wheezing.    Cardiovascular: Positive for chest pain (wax/wane with esophageal symptoms) and leg swelling (BLE chronic). Negative for palpitations.   Gastrointestinal: Positive for abdominal pain (chronic, upper), nausea and vomiting (resolved). Negative for abdominal distention, constipation and diarrhea.   Genitourinary: Negative for difficulty urinating and frequency.        +urinary incontinence   Musculoskeletal: Negative for back pain and gait problem.   Skin: Negative for rash and wound.   Neurological: Positive for dizziness and weakness.   Psychiatric/Behavioral: Negative for confusion. The patient is not nervous/anxious.      Objective:     Vital  Signs (Most Recent):  Temp: 97.8 °F (36.6 °C) (04/10/18 1930)  Pulse: 84 (04/10/18 2218)  Resp: 18 (04/10/18 2218)  BP: (!) 150/87 (04/10/18 2218)  SpO2: 98 % (04/10/18 2218) Vital Signs (24h Range):  Temp:  [97.8 °F (36.6 °C)] 97.8 °F (36.6 °C)  Pulse:  [75-97] 84  Resp:  [14-18] 18  SpO2:  [98 %-99 %] 98 %  BP: ()/(56-87) 150/87     Weight: 108.4 kg (239 lb)  Body mass index is 37.43 kg/m².    Physical Exam   Constitutional: He is oriented to person, place, and time. He appears well-developed and well-nourished.   Obese male in NAD   HENT:   Head: Normocephalic and atraumatic.   Eyes: Conjunctivae and EOM are normal.   Neck: Normal range of motion. Neck supple.   Cardiovascular: Normal rate, regular rhythm, normal heart sounds and intact distal pulses.    Pulmonary/Chest: Effort normal and breath sounds normal. He has no wheezes. He exhibits no tenderness.   Abdominal: Soft. Bowel sounds are normal. He exhibits no distension. There is tenderness (RUQ, epigastric on deep palpation). There is no rebound and no guarding. No hernia.   Bruises to abd from lovenox   Musculoskeletal: Normal range of motion. He exhibits edema (BLE, 3+ pitting). He exhibits no tenderness.   Unable to sit up in bed independently   Neurological: He is alert and oriented to person, place, and time. No cranial nerve deficit or sensory deficit.   STR 5/5 to BUE   Skin: Skin is warm and dry.   Psychiatric: He has a normal mood and affect. His behavior is normal.   Nursing note and vitals reviewed.        CRANIAL NERVES     CN III, IV, VI   Extraocular motions are normal.        Significant Labs:   CBC:   Recent Labs  Lab 04/10/18  2005   WBC 5.20   HGB 11.2*   HCT 33.1*        CMP:   Recent Labs  Lab 04/10/18  2005      K 3.8   CL 98   CO2 24   *   BUN 14   CREATININE 0.9   CALCIUM 9.0   PROT 6.4   ALBUMIN 2.9*   BILITOT 1.5*   ALKPHOS 47*   AST 25   ALT 16   ANIONGAP 16   EGFRNONAA >60.0       Significant Imaging: I  have reviewed all pertinent imaging results/findings within the past 24 hours.   X-ray Chest Ap Portable    Result Date: 4/10/2018  EXAMINATION: XR CHEST AP PORTABLE CLINICAL HISTORY: Unspecified abdominal pain TECHNIQUE: Single frontal view of the chest was performed. COMPARISON: None FINDINGS: The lungs are clear, with normal appearance of pulmonary vasculature and no pleural effusion or pneumothorax. The cardiac silhouette is is magnified by portable technique.  Mildly tortuous aorta with some atherosclerotic calcification in the arch. Bones are intact.     No acute abnormality identified.    Assessment/Plan:     * Esophageal obstruction    Esophageal spasm  Gastric carcnoma  Dysphagia x3 months with 4 EGDs without relief of symptoms.  Biopsies taken during previous EGD showed gastric carcinoma.  D/c Sunday from [facility] and told to f/u with Dr. Fermin (GI).  No previous documentation or procedures here.    - [facility] consult for EGD/EUS for gastric cancer  - Surgical oncology consult and appreciate recs  - Will need documentation from OSH  - Unable to tolerate saliva but protecting airway.  - Strict NPO, aspiration precautions  - Not currently on anticoagulation- will need to be restarted on this admission  - LR mIVF x12 hours        S/P kidney transplant    One functioning transplanted kidney  - KTM consult  - Avoid nephrotoxic agents, including contrast  - will defer abdominal imaging decision for cancer to KTM/surg-onc team  - Cr. 0.9, lytes WNL  - Mag/Phos   - Will change prednisone 10 mg PO daily to soludmedrol 10 mg IV daily while unable to tolerate PO  - Wife able to give patient imuran 150 mg PO daily crushed up in pudding at home.  No IV available, per pharmacy. Continue PO Imuran as long as patient can tolerate.        History of DVT (deep vein thrombosis)    H/o DVT and PE  - Currently not on AC.  Was on warfarin for years, but switched to eliquis.  Eliquis recently stopped d/t inability to  tolerate PO.  Will need to be restarted on eliquis after EGD/surgical intreventions. Patient does not know dosing.   - IVC filter currently in place  - Currently in hypercoagulable state        Dehydration    2/2 esophageal obstruction  - IVF replacement        Bilateral edema of lower extremity    Chronic  BNP 65 in setting of obesity  - will order 2D ECHO  - no erythema, swelling appears equal        Weakness    PT/OT consult        Hypertension    Stable  - Continue losartan 50 mg PO daily, cardizem 60 mg PO TID when able to tolerate PO          VTE Risk Mitigation         Ordered     enoxaparin injection 40 mg  Daily     Route:  Subcutaneous        04/10/18 2201     IP VTE HIGH RISK PATIENT  Once      04/10/18 2201     Place DERICK hose  Until discontinued      04/10/18 2201     Place sequential compression device  Until discontinued      04/10/18 2201             Peter Simons PA-C  Department of Hospital Medicine   [facility]

## 2018-04-11 NOTE — PLAN OF CARE
Problem: Patient Care Overview  Goal: Plan of Care Review  Outcome: Ongoing (interventions implemented as appropriate)  Neurovascularly  Intact. Family at bedside. Bed in low position and call bell in reach.

## 2018-04-11 NOTE — SUBJECTIVE & OBJECTIVE
Past Medical History:   Diagnosis Date    Cancer     stomach    Hypertension     Renal disorder 1984    bilat kidney transplant        Past Surgical History:   Procedure Laterality Date    APPENDECTOMY      w cancer tumor removal    CHOLECYSTECTOMY      COLON SURGERY      removed during appendectomy to remove tumors    KIDNEY TRANSPLANT Bilateral 1984       Review of patient's allergies indicates:   Allergen Reactions    Allopurinol analogues        No current facility-administered medications on file prior to encounter.      No current outpatient prescriptions on file prior to encounter.     Family History     None        Social History Main Topics    Smoking status: Never Smoker    Smokeless tobacco: Never Used    Alcohol use No    Drug use: No    Sexual activity: No     Review of Systems   Constitutional: Positive for activity change, appetite change, fatigue and unexpected weight change (60 lb wt loss in 2.5 months). Negative for chills, diaphoresis and fever.   HENT: Negative for congestion and rhinorrhea.    Eyes: Negative for photophobia and visual disturbance.   Respiratory: Positive for shortness of breath (chronic). Negative for cough and wheezing.    Cardiovascular: Positive for chest pain (wax/wane with esophageal symptoms) and leg swelling (BLE chronic). Negative for palpitations.   Gastrointestinal: Positive for abdominal pain (chronic, upper), nausea and vomiting (resolved). Negative for abdominal distention, constipation and diarrhea.   Genitourinary: Negative for difficulty urinating and frequency.        +urinary incontinence   Musculoskeletal: Negative for back pain and gait problem.   Skin: Negative for rash and wound.   Neurological: Positive for dizziness and weakness.   Psychiatric/Behavioral: Negative for confusion. The patient is not nervous/anxious.      Objective:     Vital Signs (Most Recent):  Temp: 97.8 °F (36.6 °C) (04/10/18 1930)  Pulse: 84 (04/10/18 2218)  Resp: 18  (04/10/18 2218)  BP: (!) 150/87 (04/10/18 2218)  SpO2: 98 % (04/10/18 2218) Vital Signs (24h Range):  Temp:  [97.8 °F (36.6 °C)] 97.8 °F (36.6 °C)  Pulse:  [75-97] 84  Resp:  [14-18] 18  SpO2:  [98 %-99 %] 98 %  BP: ()/(56-87) 150/87     Weight: 108.4 kg (239 lb)  Body mass index is 37.43 kg/m².    Physical Exam   Constitutional: He is oriented to person, place, and time. He appears well-developed and well-nourished.   Obese male in NAD   HENT:   Head: Normocephalic and atraumatic.   Eyes: Conjunctivae and EOM are normal.   Neck: Normal range of motion. Neck supple.   Cardiovascular: Normal rate, regular rhythm, normal heart sounds and intact distal pulses.    Pulmonary/Chest: Effort normal and breath sounds normal. He has no wheezes. He exhibits no tenderness.   Abdominal: Soft. Bowel sounds are normal. He exhibits no distension. There is tenderness (RUQ, epigastric on deep palpation). There is no rebound and no guarding. No hernia.   Bruises to abd from lovenox   Musculoskeletal: Normal range of motion. He exhibits edema (BLE, 3+ pitting). He exhibits no tenderness.   Unable to sit up in bed independently   Neurological: He is alert and oriented to person, place, and time. No cranial nerve deficit or sensory deficit.   STR 5/5 to BUE   Skin: Skin is warm and dry.   Psychiatric: He has a normal mood and affect. His behavior is normal.   Nursing note and vitals reviewed.        CRANIAL NERVES     CN III, IV, VI   Extraocular motions are normal.        Significant Labs:   CBC:   Recent Labs  Lab 04/10/18  2005   WBC 5.20   HGB 11.2*   HCT 33.1*        CMP:   Recent Labs  Lab 04/10/18  2005      K 3.8   CL 98   CO2 24   *   BUN 14   CREATININE 0.9   CALCIUM 9.0   PROT 6.4   ALBUMIN 2.9*   BILITOT 1.5*   ALKPHOS 47*   AST 25   ALT 16   ANIONGAP 16   EGFRNONAA >60.0       Significant Imaging: I have reviewed all pertinent imaging results/findings within the past 24 hours.   X-ray Chest Ap  Portable    Result Date: 4/10/2018  EXAMINATION: XR CHEST AP PORTABLE CLINICAL HISTORY: Unspecified abdominal pain TECHNIQUE: Single frontal view of the chest was performed. COMPARISON: None FINDINGS: The lungs are clear, with normal appearance of pulmonary vasculature and no pleural effusion or pneumothorax. The cardiac silhouette is is magnified by portable technique.  Mildly tortuous aorta with some atherosclerotic calcification in the arch. Bones are intact.     No acute abnormality identified.

## 2018-04-11 NOTE — ASSESSMENT & PLAN NOTE
H/o DVT and PE  - Currently not on AC.  Was on warfarin for years, but switched to eliquis.  Eliquis recently stopped d/t inability to tolerate PO.  Will need to be restarted on eliquis after EGD/surgical intreventions. Patient does not know dosing.   - IVC filter currently in place  - Currently in hypercoagulable state

## 2018-04-11 NOTE — CONSULTS
"[facility]  Kidney Transplant  Consult Note    Inpatient consult to Kidney/Pancreas Transplant Medicine  Consult performed by: RIVKA BEARD  Consult ordered by: JAYDEN MOSCOSO            Subjective:     History of Present Illness:   Elbert is a 68 yo WM s/p kidney transplant in 1982 (Dr. Sow at [facility]) admitted for N/V and newly diagnosed gastric cancer for multidisciplinary GI and surgical oncology eval.  He reports no issues of rejection or other txp complications. He has been maintained on prednisone 10 mg daily and azathioprine 150 mg daily. (He reports taking cyclosporine for only a short period after txp).  He states he has been able to keep his IS down by crushing meds in applesauce, but cannot eat much; wife states liquids stay down for a few minutes before he vomits it back up. From kidney standpoint, he reports no past  issues except remote UTI "years ago," but he started with urinary incontinence in very recent past. He denies pain over allograft, frequency or urgency.    He states his current illness began in January when he started with vomiting. He states he has lost 60#s since then, which he attributes to difficulty tolerating PO intake. He notes he has been in an out of the hospital multiple times over the past 3 months for similar issue.  Last week he states and EGD w/ biopsies showed stomach cancer.    Past Medical and Surgical History: Mr. Connelly has a past medical history of Cancer; Hypertension; Living-donor kidney transplant (sister) 1982 (4/11/2018); and Renal disorder (1984).  He has a past surgical history that includes Kidney transplant (Bilateral, 1984); Appendectomy; Colon surgery; and Cholecystectomy.    Past Social and Family History: Mr. Connelly reports that he has never smoked. He has never used smokeless tobacco. He reports that he does not drink alcohol or use drugs.His family history is not on file.    Intake/Output - Last 3 Shifts       04/09 " "0700 - 04/10 0659 04/10 0700 - 04/11 0659 04/11 0700 - 04/12 0659    P.O.  0     I.V. (mL/kg)  650 (6)     IV Piggyback  1000     Total Intake(mL/kg)  1650 (15.2)     Urine (mL/kg/hr)  250     Total Output   250      Net   +1400                    Review of Systems   Constitutional: Positive for fatigue and unexpected weight change (60 # since Janurary 2018). Negative for chills.   HENT: Negative for hearing loss and mouth sores.    Respiratory: Positive for choking. Negative for cough and shortness of breath (MURO).    Cardiovascular: Positive for leg swelling. Negative for chest pain.   Gastrointestinal: Positive for abdominal pain (Left side), nausea and vomiting.   Genitourinary: Negative for decreased urine volume and dysuria.   Musculoskeletal: Negative for gait problem.   Skin: Negative for rash.   Neurological: Positive for weakness. Negative for seizures.   Hematological: Does not bruise/bleed easily.   Psychiatric/Behavioral: Negative for sleep disturbance.     Objective:     Vital Signs (Most Recent):  Temp: 98 °F (36.7 °C) (04/11/18 1601)  Pulse: 79 (04/11/18 1601)  Resp: 18 (04/11/18 1601)  BP: (!) 154/74 (04/11/18 1601)  SpO2: 96 % (04/11/18 1601) Vital Signs (24h Range):  Temp:  [96.3 °F (35.7 °C)-98.1 °F (36.7 °C)] 98 °F (36.7 °C)  Pulse:  [71-97] 79  Resp:  [14-18] 18  SpO2:  [96 %-99 %] 96 %  BP: ()/(56-87) 154/74     Weight: 108.4 kg (239 lb)  Height: 5' 7" (170.2 cm)  Body mass index is 37.43 kg/m².    Physical Exam   Constitutional: He is oriented to person, place, and time. He is cooperative. No distress.   HENT:   Head: Normocephalic.   Right Ear: External ear normal.   Left Ear: External ear normal.   Nose: Nose normal. No nasal deformity.   Mouth/Throat: Mucous membranes are normal. No oral lesions.   Eyes: Conjunctivae and lids are normal. No scleral icterus.   Neck: Trachea normal. No thyroid mass present.   Cardiovascular: Normal rate and regular rhythm.    Pulmonary/Chest: Effort " normal. He has no rales.   Abdominal: Soft. He exhibits no mass. There is no hepatosplenomegaly. There is tenderness (TTP mid/left side. Allograft NT RLQ). There is no CVA tenderness. No hernia.   Musculoskeletal: Normal range of motion. He exhibits edema (2-3 + bilat LE).   Neurological: He is alert and oriented to person, place, and time. He displays no tremor. Gait normal.   Skin: Skin is warm and dry. No lesion and no rash noted. No cyanosis. Nails show no clubbing.   Psychiatric: He has a normal mood and affect. His speech is normal. Cognition and memory are normal.       Significant Labs:  Lab Results   Component Value Date    WBC 4.08 04/11/2018    HGB 9.5 (L) 04/11/2018     04/11/2018     04/11/2018    K 2.9 (L) 04/11/2018     04/11/2018    CO2 25 04/11/2018    BUN 14 04/11/2018    CREATININE 0.7 04/11/2018    EGFRNONAA >60.0 04/11/2018    CALCIUM 8.3 (L) 04/11/2018    PHOS 3.0 04/11/2018    ALBUMIN 2.9 (L) 04/10/2018    MG 1.3 (L) 04/11/2018    AST 25 04/10/2018    ALT 16 04/10/2018         Assessment/Plan:     Chronic kidney disease (CKD), stage II (mild)    -CKD2 s/p kidney transplantation. Continue to monitor renal function and electrolytes, HTN, secondary hyperparathyroidism and other issues related to underlying ESRD.   -Avoid further renal insults, when possible: nephrotoxins, volume shifts, aim for BP within target.           Living-donor kidney transplant (sister) 1982    -Doing very well since transplant  -Cont IS and watch  -See CKD          Bilateral edema of lower extremity    -Hypoalbuminemia and immobility contributing factors. Diurese if OK with primary team with close lab f/u.          Hypokalemia    -IV repletion since having great difficulty with po. Follow lab closely. Follow Mg level (also low and requires repletion)          Hypomagnesemia    --Replace IV and recheck            Thank you for the consult. Will follow with you.    Marli Hernandez MD  Kidney  Transplant  [facility]

## 2018-04-11 NOTE — HPI
Mr. Connelly is a 66yo male with a history of DVT/PE previously on warfarin, kidney transplant (1986), HTN, who presents to [facility] ED with fatigue, weakness, dizziness, and dysphagia.    The patient states he has had trouble swallowing both liquids and solids since January of this year.  [facility] esophagus.  Recently underwent an EGD on 4/6/18 which showed a dilated proximal esophagus with a distal ring/stricture, diffuse gastric edema/erythema with biopsies positive for gastric adenocarcinoma with infiltration of the lamina propria.      A noncontrasted CT did not show any distant lesions or gastric thickening.

## 2018-04-11 NOTE — ASSESSMENT & PLAN NOTE
-CKD2 s/p kidney transplantation. Continue to monitor renal function and electrolytes, HTN, secondary hyperparathyroidism and other issues related to underlying ESRD.   -Avoid further renal insults, when possible: nephrotoxins, volume shifts, aim for BP within target.

## 2018-04-11 NOTE — CONSULTS
[facility]  Advanced Endoscopy Service  Consult Note    Patient Name: Elbert Connelly  MRN: [id number]  Admission Date: 4/10/2018  Hospital Length of Stay: 1 days  Code Status: Full Code   Attending Provider: Dwight Ware MD   Consulting Provider: Shannon Pinto MD  Primary Care Physician: Primary Doctor No  Principal Problem:Esophageal obstruction    Inpatient consult to Advanced Endoscopy Service (AES)  Consult performed by: SHANNON PINTO  Consult ordered by: JAYDEN MOSCOSO        Subjective:     HPI:  Mr. 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    The patient states he has had trouble swallowing both liquids and solids since January of this year.  [facility] to [facility] es[facility].  Recently underwent an EGD on 4/6/18 which showed a dilated proximal esophagus with a distal ring/stricture, diffuse gastric edema/erythema with biopsies positive for gastric adenocarcinoma with infiltration of the lamina propria.      A noncontrasted CT did not show any distant lesions or gastric thickening.              Past Medical History:   Diagnosis Date    Cancer     stomach    Hypertension     Renal disorder 1984    bilat kidney transplant        Past Surgical History:   Procedure Laterality Date    APPENDECTOMY      w cancer tumor removal    CHOLECYSTECTOMY      COLON SURGERY      removed during appendectomy to remove tumors    KIDNEY TRANSPLANT Bilateral 1984       Review of patient's allergies indicates:   Allergen Reactions    Allopurinol analogues      Family History     None        Social History Main Topics    Smoking status: Never Smoker    Smokeless tobacco: Never Used    Alcohol use No    Drug use: No    Sexual activity: No     Review of Systems   Constitutional: Positive for fatigue and unexpected  weight change. Negative for chills, diaphoresis and fever.   HENT: Negative for trouble swallowing and voice change.    Respiratory: Positive for choking. Negative for cough and shortness of breath.    Cardiovascular: Negative for chest pain and leg swelling.   Gastrointestinal: Positive for abdominal pain. Negative for abdominal distention, anal bleeding, blood in stool, constipation, diarrhea, nausea, rectal pain and vomiting.   Genitourinary: Negative for dysuria and flank pain.   Musculoskeletal: Negative.    Skin: Negative for color change and pallor.   Neurological: Positive for dizziness and light-headedness. Negative for tremors.   Psychiatric/Behavioral: Negative for confusion and hallucinations.     Objective:     Vital Signs (Most Recent):  Temp: 96.3 °F (35.7 °C) (04/11/18 0430)  Pulse: 71 (04/11/18 0430)  Resp: 16 (04/11/18 0430)  BP: (!) 144/79 (04/11/18 0430)  SpO2: 97 % (04/11/18 0430) Vital Signs (24h Range):  Temp:  [96.3 °F (35.7 °C)-98.1 °F (36.7 °C)] 96.3 °F (35.7 °C)  Pulse:  [71-97] 71  Resp:  [14-18] 16  SpO2:  [97 %-99 %] 97 %  BP: ()/(56-87) 144/79     Weight: 108.4 kg (239 lb) (04/10/18 2358)  Body mass index is 37.43 kg/m².      Intake/Output Summary (Last 24 hours) at 04/11/18 0926  Last data filed at 04/11/18 0452   Gross per 24 hour   Intake             1650 ml   Output              250 ml   Net             1400 ml       Lines/Drains/Airways     Peripheral Intravenous Line                 External Jugular IV 04/10/18 2004 less than 1 day         Peripheral IV - Single Lumen 04/10/18 2003 Left Hand less than 1 day                Physical Exam   Constitutional: He is oriented to person, place, and time. No distress.   Eyes: Conjunctivae are normal. No scleral icterus.   Neck: Neck supple. No tracheal deviation present.   Cardiovascular: Normal rate, regular rhythm and normal heart sounds.    Pulmonary/Chest: Effort normal and breath sounds normal. No respiratory distress.    Abdominal: Soft. Bowel sounds are normal. He exhibits no distension. There is no tenderness.   Neurological: He is alert and oriented to person, place, and time.   Skin: Skin is warm and dry. He is not diaphoretic.   Psychiatric: He has a normal mood and affect.       Significant Labs:  CBC:   Recent Labs  Lab 04/10/18  2005 04/11/18  0516   WBC 5.20 4.08   HGB 11.2* 9.5*   HCT 33.1* 28.3*    196     CMP:   Recent Labs  Lab 04/10/18  2005 04/11/18  0516   * 97   CALCIUM 9.0 8.3*   ALBUMIN 2.9*  --    PROT 6.4  --     138   K 3.8 2.9*   CO2 24 25   CL 98 101   BUN 14 14   CREATININE 0.9 0.7   ALKPHOS 47*  --    ALT 16  --    AST 25  --    BILITOT 1.5*  --      Coagulation:   Recent Labs  Lab 04/10/18  2005   INR 1.1       Significant Imaging:  Imaging results within the past 24 hours have been reviewed.    Assessment/Plan:     Gastric carcinoma    Recent biopsies positive for adenocarcinoma of the gastric body.  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    Recommendations:  CT thorax/abdomen/pelvis w/IV contrast for staging  EUS tomorrow pending CT results  Keep NPO@midnight  SLP consult for recs re: meds/diet   Surg Onc consultation                  Thank you for your consult. I will follow-up with patient. Please contact us if you have any additional questions.    Rene Andrea   Gastroenterology Fellow PGY VI  [phone]

## 2018-04-11 NOTE — HPI
This is a 67 y.o. male with medical problems including HTN, DVT/PE (IVC filter), renal disorder, and history of kidney transplant who presents with complaint of abdominal pain, fatigue, weakness, dizziness and esophageal obstruction. Pt has been having difficulty tolerating PO intake since January. He has been in an out of the hospital multiple times over the past 3 months for similar issue.  Pt was told he has esophageal spasms and was esophagus is in a corkscrew fashion. Last week pt had EGD and biopsies taken at that appointment showed stomach cancer. Pt was discharged on Sunday and told to follow up with oncologist at [facility] and have general surgery see him, despite still unable to tolerate PO intake. Family reports he had not been able to keeps down any food or liquids or swallow his saliva since discharge. They state liquids stay down for a few minutes before he vomits it back up.  Today home health nurse saw the pt and stated they should report to the ED due to the pt's condition. Pt denies hematemesis, hematuria, and blood in stool.      Of note, patient does report urinary incontinence x3 weeks, chronic BLE edema, chronic SOB/MURO, easy fatigability.      In ED, given 1L NS, 1L D51/2 NS.  CXR clear, BNP 65.  Albumin 2.5. Lactate 2.1 HDS, 99% on RA.

## 2018-04-11 NOTE — ED PROVIDER NOTES
Encounter Date: 4/10/2018    SCRIBE #1 NOTE: I, Gala Solorzano, am scribing for, and in the presence of,  Dr. Manzano. I have scribed the following portions of the note - Other sections scribed: HPI, ROS, PE.       History     Chief Complaint   Patient presents with    Abdominal Pain     Pt states that he has stomach cancer and sees Dr. Magallon here. Pt was seen at [facility] this past week and was advised to come here; discharge was sunday. He states abd pain, weakness, dizziness, N/V and unable to keep fluids down.     Fatigue      Time seen by provider: 7:47 PM     This is a 67 y.o. male with medical problems including HTN, renal disorder, and history of kidney transplant who presents with complaint of abdominal pain, fatigue, and vomiting. Pt has been having difficulty swallowing and keeping food and liquids down since January. He has been in an out of the hospital multiple times and was told his esophagus spasmed and stuck in a corkscrew fashion. Last week pt had procedure to open his esophagus and biopsies taken at that appointment showed stomach cancer. Pt was discharged on Sunday and told to follow up with his oncologist at [facility]. Family reports he had not been able to keeps down any food or liquids or swallow his saliva since discharge. They state liquids stay down for a few minutes before he vomits it back up. He is receiving fluids and nutrition through IV. Today home health nurse saw the pt and stated they should report to the ED due to the pt's condition. Pt denies hematemesis, hematuria, and blood in stool.       The history is provided by the patient, a relative and medical records.     Review of patient's allergies indicates:   Allergen Reactions    Allopurinol analogues      No past medical history on file.  No past surgical history on file.  No family history on file.  Social History   Substance Use Topics    Smoking status: Not on file    Smokeless tobacco: Not on file    Alcohol use  Not on file     Review of Systems   Constitutional: Positive for fatigue.   HENT: Positive for trouble swallowing.    Gastrointestinal: Positive for abdominal pain and vomiting. Negative for blood in stool.        Negative for hematemesis.    Genitourinary: Negative for hematuria.   All other systems reviewed and are negative.      Physical Exam     Initial Vitals [04/10/18 1930]   BP Pulse Resp Temp SpO2   (!) 95/56 97 18 97.8 °F (36.6 °C) 99 %      MAP       69         Physical Exam    Nursing note and vitals reviewed.  Constitutional: He appears well-developed and well-nourished. No distress.   HENT:   Head: Normocephalic and atraumatic.   Tacky, sticky oral mucosa.     Eyes:   Mild periorbital icterus. Mild conjunctival pallor.     Neck: Normal range of motion. Neck supple.   Cardiovascular: Normal rate, regular rhythm and normal heart sounds.   Pulmonary/Chest: Breath sounds normal. No respiratory distress.   Abdominal: Soft. Bowel sounds are normal. He exhibits no distension. There is no tenderness.   Musculoskeletal: Normal range of motion.   Soft 3+ pitting edema to bilateral lower extremities.        Neurological: He is alert and oriented to person, place, and time. He has normal strength. No sensory deficit.   Skin: Skin is warm and dry. Capillary refill takes less than 2 seconds.   Poor skin turgor.   Superficial bruising noted to abdominal wall.   Right EJ placed by me due to poor access.     Psychiatric: He has a normal mood and affect.         ED Course   Procedures  Labs Reviewed   CULTURE, BLOOD   CULTURE, BLOOD   CULTURE, URINE   CBC W/ AUTO DIFFERENTIAL   COMPREHENSIVE METABOLIC PANEL   LACTIC ACID, PLASMA   URINALYSIS   PROTIME-INR   AMYLASE   LIPASE     EKG Readings: (Independently Interpreted)   Previous EKG: Compared with most recent EKG Previous EKG Date: 3/17/1992. Rhythm: Normal Sinus Rhythm. Conduction: LBBB. Axis: Left Axis Deviation. Clinical Impression: Normal Sinus Rhythm with LBBB           Medical Decision Making:   History:   Old Medical Records: I decided to obtain old medical records.  Clinical Tests:   Lab Tests: Ordered and Reviewed  Radiological Study: Ordered and Reviewed  Medical Tests: Ordered and Reviewed  Likely Alexis Fermin with advancd endoscopy. Will need to admit with inability to adarsh po and inability to handle secretions, aspiration risk. Dehydration, but no acute electrolyte abnormality. . Sx markedly improved with ivf.             Scribe Attestation:   Scribe #1: I performed the above scribed service and the documentation accurately describes the services I performed. I attest to the accuracy of the note.               Clinical Impression:   Diagnoses of Abdominal pain and Abdominal pain were pertinent to this visit.                           Twan Manzano MD  04/11/18 0154

## 2018-04-11 NOTE — ASSESSMENT & PLAN NOTE
Chronic  BNP 64 in setting of obesity  - will order 2D ECHO  - no erythema, swelling appears equal

## 2018-04-11 NOTE — ED NOTES
Pt stable w no ss of distress at this time. States nausea has subsided. No needs noted at this time.

## 2018-04-11 NOTE — ANESTHESIA PREPROCEDURE EVALUATION
[facility]  Anesthesia Pre-Operative Evaluation         Patient Name: Elbert Connelly  YOB: 1950  MRN: [id number]    SUBJECTIVE:     Pre-operative evaluation for Procedure(s) (LRB):  ULTRASOUND-ENDOSCOPIC-UPPER (N/A)     04/11/2018    Elbert Connelly is a 67 y.o. 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    Patient recently underwent an EGD on 4/6/18 which showed a dilated proximal esophagus with a distal ring/stricture, diffuse gastric edema/erythema with biopsies positive for gastric adenocarcinoma with infiltration of the lamina propria.      LDA:   L thumb 20G  R EJ 18 G    Prev airway: None documented.     Drips:    lactated ringers 75 mL/hr at 04/11/18 0045         Patient Active Problem List   Diagnosis    Esophageal obstruction    Bilateral edema of lower extremity    Dehydration    Abdominal pain    Gastric carcinoma    Weakness    Esophageal spasm    History of DVT (deep vein thrombosis)    S/P kidney transplant    Hypertension       Review of patient's allergies indicates:   Allergen Reactions    Allopurinol analogues        Current Inpatient Medications:   azaTHIOprine  150 mg Oral Daily    enoxaparin  40 mg Subcutaneous Daily    magnesium sulfate IVPB  2 g Intravenous Once    methylPREDNISolone sodium succinate  10 mg Intravenous Daily    pantoprazole 40 mg in dextrose 5 % 100 mL infusion (ready to mix system)  40 mg Intravenous BID    potassium chloride  10 mEq Intravenous Q1H       No current facility-administered medications on file prior to encounter.      No current outpatient prescriptions on file prior to encounter.       Past Surgical History:   Procedure Laterality Date    APPENDECTOMY      w cancer tumor removal    CHOLECYSTECTOMY      COLON SURGERY      removed  during appendectomy to remove tumors    KIDNEY TRANSPLANT Bilateral        Social History     Social History    Marital status:      Spouse name: N/A    Number of children: N/A    Years of education: N/A     Occupational History    Not on file.     Social History Main Topics    Smoking status: Never Smoker    Smokeless tobacco: Never Used    Alcohol use No    Drug use: No    Sexual activity: No     Other Topics Concern    Not on file     Social History Narrative    No narrative on file       OBJECTIVE:     Vital Signs Range (Last 24H):  Temp:  [35.7 °C (96.3 °F)-36.7 °C (98.1 °F)]   Pulse:  [71-97]   Resp:  [14-18]   BP: ()/(56-87)   SpO2:  [97 %-99 %]       CBC:   Recent Labs      04/10/18   2005  04/11/18   0516   WBC  5.20  4.08   RBC  3.52*  2.99*   HGB  11.2*  9.5*   HCT  33.1*  28.3*   PLT  298  196   MCV  94  95   MCH  31.8*  31.8*   MCHC  33.8  33.6       CMP:   Recent Labs      04/10/18   2005  04/11/18   0516   NA  138  138   K  3.8  2.9*   CL  98  101   CO2  24  25   BUN  14  14   CREATININE  0.9  0.7   GLU  111*  97   MG   --   1.3*   PHOS   --   3.0   CALCIUM  9.0  8.3*   ALBUMIN  2.9*   --    PROT  6.4   --    ALKPHOS  47*   --    ALT  16   --    AST  25   --    BILITOT  1.5*   --        INR:  Recent Labs      04/10/18   2005   INR  1.1       Diagnostic Studies: No relevant studies.    EK/10/18  Normal sinus rhythm  Left bundle branch block  Abnormal ECG  When compared with ECG of 17-MAR-1992 09:29,  Vent. rate has increased BY  29 BPM  Left bundle branch block is now Present    2D ECHO:  No results found for this or any previous visit.      ASSESSMENT/PLAN:         Anesthesia Evaluation    I have reviewed the Patient Summary Reports.     I have reviewed the Medications.   Prednisone    Review of Systems  Anesthesia Hx:  History of prior surgery of interest to airway management or planning: Denies Family Hx of Anesthesia complications.   Denies Personal Hx of  Anesthesia complications.   Hematology/Oncology:        Hematology Comments: Hx of dvt/pe Current/Recent Cancer. (bx proven gastric adenocarcinoma )   EENT/Dental:EENT/Dental Normal   Cardiovascular:   Hypertension    Pulmonary:  Pulmonary Normal    Renal/:   Chronic Renal Disease (hx of kidney transplant )    Hepatic/GI:   Dysphagia, bx proven gastric adenocarcinoma.  Intolerant to liquid intake   Neurological:  Neurology Normal    Endocrine:  Endocrine Normal        Physical Exam  General:  Obesity    Airway/Jaw/Neck:  Airway Findings: Mouth Opening: Normal Tongue: Normal  Mallampati: III  Improves to II with phonation.  TM Distance: Normal, at least 6 cm     Eyes/Ears/Nose:  EYES/EARS/NOSE FINDINGS: Normal   Dental:  Dental Findings:    Chest/Lungs:  Chest/Lungs Clear    Heart/Vascular:  Heart Findings: Normal    Abdomen:  Abdomen Findings: Normal      Mental Status:  Mental Status Findings:  Cooperative, Alert and Oriented         Anesthesia Plan  Type of Anesthesia, risks & benefits discussed:  Anesthesia Type:  general  Patient's Preference:   Intra-op Monitoring Plan: standard ASA monitors  Intra-op Monitoring Plan Comments:   Post Op Pain Control Plan: per primary service following discharge from PACU  Post Op Pain Control Plan Comments:   Induction:   IV and rapid sequence  Beta Blocker:  Patient is not currently on a Beta-Blocker (No further documentation required).       Informed Consent: Patient understands risks and agrees with Anesthesia plan.  Questions answered. Anesthesia consent signed with patient.  ASA Score: 3     Day of Surgery Review of History & Physical:  There are no significant changes.      Anesthesia Plan Notes: RSI and GETA        Ready For Surgery From Anesthesia Perspective.

## 2018-04-11 NOTE — ASSESSMENT & PLAN NOTE
Esophageal spasm  Gastric carcnoma  Dysphagia x3 months with 4 EGDs without relief of symptoms.  Biopsies taken during previous EGD showed gastric carcinoma.  D/c Sunday from [facility] and told to f/u with Dr. Fermin (GI).  No previous documentation or procedures here.    - AES consult for EGD/EUS for gastric cancer  - Surgical oncology consult and appreciate recs  - Will need documentation from OSH  - Unable to tolerate saliva but protecting airway.  - Strict NPO, aspiration precautions  - Not currently on anticoagulation- will need to be restarted on this admission  - LR mIVF x12 hours

## 2018-04-11 NOTE — SUBJECTIVE & OBJECTIVE
"    Subjective:     History of Present Illness:   Elbert is a 66 yo WM s/p kidney transplant in 1982 (Dr. Sow at [facility]) admitted for N/V and newly diagnosed gastric cancer for multidisciplinary GI and surgical oncology eval.  He reports no issues of rejection or other txp complications. He has been maintained on prednisone 10 mg daily and azathioprine 150 mg daily. (He reports taking cyclosporine for only a short period after txp).  He states he has been able to keep his IS down by crushing meds in applesauce, but cannot eat much; wife states liquids stay down for a few minutes before he vomits it back up. From kidney standpoint, he reports no past  issues except remote UTI "years ago," but he started with urinary incontinence in very recent past. He denies pain over allograft, frequency or urgency.    He states his current illness began in January when he started with vomiting. He states he has lost 60#s since then, which he attributes to difficulty tolerating PO intake. He notes he has been in an out of the hospital multiple times over the past 3 months for similar issue.  Last week he states and EGD w/ biopsies showed stomach cancer.    Past Medical and Surgical History: Mr. Connelly has a past medical history of Cancer; Hypertension; Living-donor kidney transplant (sister) 1982 (4/11/2018); and Renal disorder (1984).  He has a past surgical history that includes Kidney transplant (Bilateral, 1984); Appendectomy; Colon surgery; and Cholecystectomy.    Past Social and Family History: Mr. Connelly reports that he has never smoked. He has never used smokeless tobacco. He reports that he does not drink alcohol or use drugs.His family history is not on file.    Intake/Output - Last 3 Shifts       04/09 0700 - 04/10 0659 04/10 0700 - 04/11 0659 04/11 0700 - 04/12 0659    P.O.  0     I.V. (mL/kg)  650 (6)     IV Piggyback  1000     Total Intake(mL/kg)  1650 (15.2)     Urine (mL/kg/hr)  250     Total Output   250      Net  " " +1400                    Review of Systems   Constitutional: Positive for fatigue and unexpected weight change (60 # since Janurary 2018). Negative for chills.   HENT: Negative for hearing loss and mouth sores.    Respiratory: Positive for choking. Negative for cough and shortness of breath (MURO).    Cardiovascular: Positive for leg swelling. Negative for chest pain.   Gastrointestinal: Positive for abdominal pain (Left side), nausea and vomiting.   Genitourinary: Negative for decreased urine volume and dysuria.   Musculoskeletal: Negative for gait problem.   Skin: Negative for rash.   Neurological: Positive for weakness. Negative for seizures.   Hematological: Does not bruise/bleed easily.   Psychiatric/Behavioral: Negative for sleep disturbance.     Objective:     Vital Signs (Most Recent):  Temp: 98 °F (36.7 °C) (04/11/18 1601)  Pulse: 79 (04/11/18 1601)  Resp: 18 (04/11/18 1601)  BP: (!) 154/74 (04/11/18 1601)  SpO2: 96 % (04/11/18 1601) Vital Signs (24h Range):  Temp:  [96.3 °F (35.7 °C)-98.1 °F (36.7 °C)] 98 °F (36.7 °C)  Pulse:  [71-97] 79  Resp:  [14-18] 18  SpO2:  [96 %-99 %] 96 %  BP: ()/(56-87) 154/74     Weight: 108.4 kg (239 lb)  Height: 5' 7" (170.2 cm)  Body mass index is 37.43 kg/m².    Physical Exam   Constitutional: He is oriented to person, place, and time. He is cooperative. No distress.   HENT:   Head: Normocephalic.   Right Ear: External ear normal.   Left Ear: External ear normal.   Nose: Nose normal. No nasal deformity.   Mouth/Throat: Mucous membranes are normal. No oral lesions.   Eyes: Conjunctivae and lids are normal. No scleral icterus.   Neck: Trachea normal. No thyroid mass present.   Cardiovascular: Normal rate and regular rhythm.    Pulmonary/Chest: Effort normal. He has no rales.   Abdominal: Soft. He exhibits no mass. There is no hepatosplenomegaly. There is tenderness (TTP mid/left side. Allograft NT RLQ). There is no CVA tenderness. No hernia.   Musculoskeletal: Normal " range of motion. He exhibits edema (2-3 + bilat LE).   Neurological: He is alert and oriented to person, place, and time. He displays no tremor. Gait normal.   Skin: Skin is warm and dry. No lesion and no rash noted. No cyanosis. Nails show no clubbing.   Psychiatric: He has a normal mood and affect. His speech is normal. Cognition and memory are normal.       Significant Labs:  Lab Results   Component Value Date    WBC 4.08 04/11/2018    HGB 9.5 (L) 04/11/2018     04/11/2018     04/11/2018    K 2.9 (L) 04/11/2018     04/11/2018    CO2 25 04/11/2018    BUN 14 04/11/2018    CREATININE 0.7 04/11/2018    EGFRNONAA >60.0 04/11/2018    CALCIUM 8.3 (L) 04/11/2018    PHOS 3.0 04/11/2018    ALBUMIN 2.9 (L) 04/10/2018    MG 1.3 (L) 04/11/2018    AST 25 04/10/2018    ALT 16 04/10/2018

## 2018-04-11 NOTE — ASSESSMENT & PLAN NOTE
One functioning transplanted kidney  - KTM consult  - Avoid nephrotoxic agents, including contrast  - will defer abdominal imaging decision for cancer to KTM/surg-onc team  - Cr. 0.9, fanny COREA  - Mag/Phos   - Will change prednisone 10 mg PO daily to soludmedrol 10 mg IV daily while unable to tolerate PO  - Wife able to give patient imuran 150 mg PO daily crushed up in pudding at home.  No IV available, per pharmacy. Continue PO Imuran as long as patient can tolerate.

## 2018-04-12 ENCOUNTER — SURGERY (OUTPATIENT)
Age: 68
End: 2018-04-12

## 2018-04-12 ENCOUNTER — ANESTHESIA (OUTPATIENT)
Dept: ENDOSCOPY | Facility: HOSPITAL | Age: 68
DRG: 374 | End: 2018-04-12
Payer: MEDICARE

## 2018-04-12 PROBLEM — I48.91 ATRIAL FIBRILLATION: Status: ACTIVE | Noted: 2018-04-12

## 2018-04-12 LAB
ANION GAP SERPL CALC-SCNC: 11 MMOL/L
ANION GAP SERPL CALC-SCNC: 12 MMOL/L
ANION GAP SERPL CALC-SCNC: 14 MMOL/L
BACTERIA UR CULT: NORMAL
BASOPHILS # BLD AUTO: 0.02 K/UL
BASOPHILS # BLD AUTO: 0.04 K/UL
BASOPHILS NFR BLD: 0.5 %
BASOPHILS NFR BLD: 1.3 %
BUN SERPL-MCNC: 10 MG/DL
BUN SERPL-MCNC: 10 MG/DL
BUN SERPL-MCNC: 13 MG/DL
CALCIUM SERPL-MCNC: 7.9 MG/DL
CALCIUM SERPL-MCNC: 8.2 MG/DL
CALCIUM SERPL-MCNC: 8.2 MG/DL
CHLORIDE SERPL-SCNC: 104 MMOL/L
CHLORIDE SERPL-SCNC: 104 MMOL/L
CHLORIDE SERPL-SCNC: 106 MMOL/L
CK MB SERPL-MCNC: 0.6 NG/ML
CK MB SERPL-RTO: 6 %
CK SERPL-CCNC: 10 U/L
CO2 SERPL-SCNC: 20 MMOL/L
CO2 SERPL-SCNC: 22 MMOL/L
CO2 SERPL-SCNC: 24 MMOL/L
CREAT SERPL-MCNC: 0.6 MG/DL
CREAT SERPL-MCNC: 0.7 MG/DL
CREAT SERPL-MCNC: 0.7 MG/DL
DIFFERENTIAL METHOD: ABNORMAL
DIFFERENTIAL METHOD: ABNORMAL
EOSINOPHIL # BLD AUTO: 0.1 K/UL
EOSINOPHIL # BLD AUTO: 0.1 K/UL
EOSINOPHIL NFR BLD: 1.6 %
EOSINOPHIL NFR BLD: 2.8 %
ERYTHROCYTE [DISTWIDTH] IN BLOOD BY AUTOMATED COUNT: 17.7 %
ERYTHROCYTE [DISTWIDTH] IN BLOOD BY AUTOMATED COUNT: 17.8 %
EST. GFR  (AFRICAN AMERICAN): >60 ML/MIN/1.73 M^2
EST. GFR  (NON AFRICAN AMERICAN): >60 ML/MIN/1.73 M^2
GLUCOSE SERPL-MCNC: 120 MG/DL
GLUCOSE SERPL-MCNC: 77 MG/DL
GLUCOSE SERPL-MCNC: 88 MG/DL
HCT VFR BLD AUTO: 28.2 %
HCT VFR BLD AUTO: 29.9 %
HGB BLD-MCNC: 10 G/DL
HGB BLD-MCNC: 9.6 G/DL
IMM GRANULOCYTES # BLD AUTO: 0.02 K/UL
IMM GRANULOCYTES # BLD AUTO: 0.03 K/UL
IMM GRANULOCYTES NFR BLD AUTO: 0.5 %
IMM GRANULOCYTES NFR BLD AUTO: 0.9 %
LYMPHOCYTES # BLD AUTO: 0.9 K/UL
LYMPHOCYTES # BLD AUTO: 0.9 K/UL
LYMPHOCYTES NFR BLD: 23.7 %
LYMPHOCYTES NFR BLD: 29.5 %
MAGNESIUM SERPL-MCNC: 1.5 MG/DL
MAGNESIUM SERPL-MCNC: 1.6 MG/DL
MCH RBC QN AUTO: 31.9 PG
MCH RBC QN AUTO: 32.4 PG
MCHC RBC AUTO-ENTMCNC: 33.4 G/DL
MCHC RBC AUTO-ENTMCNC: 34 G/DL
MCV RBC AUTO: 94 FL
MCV RBC AUTO: 97 FL
MONOCYTES # BLD AUTO: 0.1 K/UL
MONOCYTES # BLD AUTO: 0.1 K/UL
MONOCYTES NFR BLD: 3.3 %
MONOCYTES NFR BLD: 3.8 %
NEUTROPHILS # BLD AUTO: 2 K/UL
NEUTROPHILS # BLD AUTO: 2.6 K/UL
NEUTROPHILS NFR BLD: 61.7 %
NEUTROPHILS NFR BLD: 70.4 %
NRBC BLD-RTO: 0 /100 WBC
NRBC BLD-RTO: 0 /100 WBC
PHOSPHATE SERPL-MCNC: 2.6 MG/DL
PHOSPHATE SERPL-MCNC: 3.3 MG/DL
PLATELET # BLD AUTO: 151 K/UL
PLATELET # BLD AUTO: 180 K/UL
PMV BLD AUTO: 9.3 FL
PMV BLD AUTO: 9.9 FL
POCT GLUCOSE: 113 MG/DL (ref 70–110)
POTASSIUM SERPL-SCNC: 3.3 MMOL/L
POTASSIUM SERPL-SCNC: 3.6 MMOL/L
POTASSIUM SERPL-SCNC: 4.1 MMOL/L
RBC # BLD AUTO: 3.01 M/UL
RBC # BLD AUTO: 3.09 M/UL
SODIUM SERPL-SCNC: 138 MMOL/L
SODIUM SERPL-SCNC: 139 MMOL/L
SODIUM SERPL-SCNC: 140 MMOL/L
TROPONIN I SERPL DL<=0.01 NG/ML-MCNC: <0.006 NG/ML
WBC # BLD AUTO: 3.19 K/UL
WBC # BLD AUTO: 3.67 K/UL

## 2018-04-12 PROCEDURE — C9113 INJ PANTOPRAZOLE SODIUM, VIA: HCPCS | Performed by: PHYSICIAN ASSISTANT

## 2018-04-12 PROCEDURE — 63600175 PHARM REV CODE 636 W HCPCS: Performed by: ANESTHESIOLOGY

## 2018-04-12 PROCEDURE — D9220A PRA ANESTHESIA: Mod: CRNA,,, | Performed by: NURSE ANESTHETIST, CERTIFIED REGISTERED

## 2018-04-12 PROCEDURE — 99233 SBSQ HOSP IP/OBS HIGH 50: CPT | Mod: ,,, | Performed by: INTERNAL MEDICINE

## 2018-04-12 PROCEDURE — 37000008 HC ANESTHESIA 1ST 15 MINUTES: Performed by: INTERNAL MEDICINE

## 2018-04-12 PROCEDURE — 25000003 PHARM REV CODE 250: Performed by: NURSE ANESTHETIST, CERTIFIED REGISTERED

## 2018-04-12 PROCEDURE — 63600175 PHARM REV CODE 636 W HCPCS: Performed by: PHYSICIAN ASSISTANT

## 2018-04-12 PROCEDURE — D9220A PRA ANESTHESIA: Mod: ANES,,, | Performed by: ANESTHESIOLOGY

## 2018-04-12 PROCEDURE — 37000009 HC ANESTHESIA EA ADD 15 MINS: Performed by: INTERNAL MEDICINE

## 2018-04-12 PROCEDURE — 84100 ASSAY OF PHOSPHORUS: CPT | Mod: 91

## 2018-04-12 PROCEDURE — 93010 ELECTROCARDIOGRAM REPORT: CPT | Mod: ,,, | Performed by: INTERNAL MEDICINE

## 2018-04-12 PROCEDURE — 84484 ASSAY OF TROPONIN QUANT: CPT

## 2018-04-12 PROCEDURE — 83735 ASSAY OF MAGNESIUM: CPT | Mod: 91

## 2018-04-12 PROCEDURE — 63600175 PHARM REV CODE 636 W HCPCS: Performed by: HOSPITALIST

## 2018-04-12 PROCEDURE — 36415 COLL VENOUS BLD VENIPUNCTURE: CPT

## 2018-04-12 PROCEDURE — 20600001 HC STEP DOWN PRIVATE ROOM

## 2018-04-12 PROCEDURE — 82553 CREATINE MB FRACTION: CPT

## 2018-04-12 PROCEDURE — 82550 ASSAY OF CK (CPK): CPT

## 2018-04-12 PROCEDURE — 85025 COMPLETE CBC W/AUTO DIFF WBC: CPT

## 2018-04-12 PROCEDURE — 63600175 PHARM REV CODE 636 W HCPCS: Performed by: NURSE ANESTHETIST, CERTIFIED REGISTERED

## 2018-04-12 PROCEDURE — 93010 ELECTROCARDIOGRAM REPORT: CPT | Mod: 76,,, | Performed by: INTERNAL MEDICINE

## 2018-04-12 PROCEDURE — 84100 ASSAY OF PHOSPHORUS: CPT

## 2018-04-12 PROCEDURE — 80048 BASIC METABOLIC PNL TOTAL CA: CPT | Mod: 91

## 2018-04-12 PROCEDURE — 94761 N-INVAS EAR/PLS OXIMETRY MLT: CPT

## 2018-04-12 PROCEDURE — 25000003 PHARM REV CODE 250: Performed by: PHYSICIAN ASSISTANT

## 2018-04-12 PROCEDURE — 25000003 PHARM REV CODE 250: Performed by: ANESTHESIOLOGY

## 2018-04-12 PROCEDURE — 93005 ELECTROCARDIOGRAM TRACING: CPT

## 2018-04-12 PROCEDURE — 83735 ASSAY OF MAGNESIUM: CPT

## 2018-04-12 PROCEDURE — 27000221 HC OXYGEN, UP TO 24 HOURS

## 2018-04-12 RX ORDER — ADENOSINE 3 MG/ML
INJECTION, SOLUTION INTRAVENOUS
Status: COMPLETED | OUTPATIENT
Start: 2018-04-12 | End: 2018-04-12

## 2018-04-12 RX ORDER — EPINEPHRINE 0.1 MG/ML
INJECTION INTRAVENOUS
Status: DISCONTINUED | OUTPATIENT
Start: 2018-04-12 | End: 2018-04-12

## 2018-04-12 RX ORDER — MAGNESIUM SULFATE HEPTAHYDRATE 40 MG/ML
2 INJECTION, SOLUTION INTRAVENOUS ONCE
Status: COMPLETED | OUTPATIENT
Start: 2018-04-12 | End: 2018-04-13

## 2018-04-12 RX ORDER — SODIUM CHLORIDE 9 MG/ML
INJECTION, SOLUTION INTRAVENOUS CONTINUOUS PRN
Status: DISCONTINUED | OUTPATIENT
Start: 2018-04-12 | End: 2018-04-12

## 2018-04-12 RX ORDER — ESMOLOL HYDROCHLORIDE 10 MG/ML
INJECTION INTRAVENOUS
Status: DISCONTINUED | OUTPATIENT
Start: 2018-04-12 | End: 2018-04-12

## 2018-04-12 RX ORDER — SUCCINYLCHOLINE CHLORIDE 20 MG/ML
INJECTION INTRAMUSCULAR; INTRAVENOUS
Status: DISCONTINUED | OUTPATIENT
Start: 2018-04-12 | End: 2018-04-12

## 2018-04-12 RX ORDER — DILTIAZEM HYDROCHLORIDE 5 MG/ML
10 INJECTION INTRAVENOUS ONCE
Status: DISCONTINUED | OUTPATIENT
Start: 2018-04-12 | End: 2018-04-15

## 2018-04-12 RX ORDER — PHENYLEPHRINE HCL IN 0.9% NACL 1 MG/10 ML
SYRINGE (ML) INTRAVENOUS
Status: DISPENSED
Start: 2018-04-12 | End: 2018-04-12

## 2018-04-12 RX ORDER — PROPOFOL 10 MG/ML
VIAL (ML) INTRAVENOUS
Status: DISCONTINUED | OUTPATIENT
Start: 2018-04-12 | End: 2018-04-12

## 2018-04-12 RX ORDER — SODIUM CHLORIDE 0.9 % (FLUSH) 0.9 %
3 SYRINGE (ML) INJECTION
Status: DISCONTINUED | OUTPATIENT
Start: 2018-04-12 | End: 2018-04-12 | Stop reason: HOSPADM

## 2018-04-12 RX ORDER — FENTANYL CITRATE 50 UG/ML
INJECTION, SOLUTION INTRAMUSCULAR; INTRAVENOUS
Status: DISCONTINUED | OUTPATIENT
Start: 2018-04-12 | End: 2018-04-12

## 2018-04-12 RX ORDER — METOPROLOL TARTRATE 1 MG/ML
1 INJECTION, SOLUTION INTRAVENOUS EVERY 5 MIN PRN
Status: DISCONTINUED | OUTPATIENT
Start: 2018-04-12 | End: 2018-04-12 | Stop reason: HOSPADM

## 2018-04-12 RX ORDER — LIDOCAINE HCL/PF 100 MG/5ML
SYRINGE (ML) INTRAVENOUS
Status: DISCONTINUED | OUTPATIENT
Start: 2018-04-12 | End: 2018-04-12

## 2018-04-12 RX ORDER — ADENOSINE 3 MG/ML
INJECTION, SOLUTION INTRAVENOUS
Status: DISCONTINUED | OUTPATIENT
Start: 2018-04-12 | End: 2018-04-12

## 2018-04-12 RX ORDER — MIDAZOLAM HYDROCHLORIDE 1 MG/ML
INJECTION, SOLUTION INTRAMUSCULAR; INTRAVENOUS
Status: DISCONTINUED | OUTPATIENT
Start: 2018-04-12 | End: 2018-04-12

## 2018-04-12 RX ORDER — ROCURONIUM BROMIDE 10 MG/ML
INJECTION, SOLUTION INTRAVENOUS
Status: DISCONTINUED | OUTPATIENT
Start: 2018-04-12 | End: 2018-04-12

## 2018-04-12 RX ORDER — PHENYLEPHRINE HYDROCHLORIDE 10 MG/ML
INJECTION INTRAVENOUS
Status: DISCONTINUED | OUTPATIENT
Start: 2018-04-12 | End: 2018-04-12

## 2018-04-12 RX ORDER — PHENYLEPHRINE HCL IN 0.9% NACL 1 MG/10 ML
100 SYRINGE (ML) INTRAVENOUS ONCE
Status: DISCONTINUED | OUTPATIENT
Start: 2018-04-12 | End: 2018-04-12 | Stop reason: HOSPADM

## 2018-04-12 RX ADMIN — FENTANYL CITRATE 50 MCG: 50 INJECTION, SOLUTION INTRAMUSCULAR; INTRAVENOUS at 08:04

## 2018-04-12 RX ADMIN — PHENYLEPHRINE HYDROCHLORIDE 200 MCG: 10 INJECTION INTRAVENOUS at 08:04

## 2018-04-12 RX ADMIN — ACETAMINOPHEN 1000 MG: 10 INJECTION, SOLUTION INTRAVENOUS at 10:04

## 2018-04-12 RX ADMIN — PHENYLEPHRINE HYDROCHLORIDE 400 MCG: 10 INJECTION INTRAVENOUS at 08:04

## 2018-04-12 RX ADMIN — SODIUM CHLORIDE: 0.9 INJECTION, SOLUTION INTRAVENOUS at 08:04

## 2018-04-12 RX ADMIN — ADENOSINE 6 MG: 3 INJECTION, SOLUTION INTRAVENOUS at 08:04

## 2018-04-12 RX ADMIN — MIDAZOLAM HYDROCHLORIDE 2 MG: 1 INJECTION, SOLUTION INTRAMUSCULAR; INTRAVENOUS at 08:04

## 2018-04-12 RX ADMIN — AZATHIOPRINE 150 MG: 50 TABLET ORAL at 04:04

## 2018-04-12 RX ADMIN — ESMOLOL HYDROCHLORIDE 10 MG: 10 INJECTION INTRAVENOUS at 08:04

## 2018-04-12 RX ADMIN — EPINEPHRINE 10 MCG: 0.1 INJECTION, SOLUTION ENDOTRACHEAL; INTRACARDIAC; INTRAVENOUS at 08:04

## 2018-04-12 RX ADMIN — AMIODARONE HYDROCHLORIDE 150 MG: 50 INJECTION, SOLUTION INTRAVENOUS at 08:04

## 2018-04-12 RX ADMIN — SUCCINYLCHOLINE CHLORIDE 140 MG: 20 INJECTION, SOLUTION INTRAMUSCULAR; INTRAVENOUS at 08:04

## 2018-04-12 RX ADMIN — LIDOCAINE HYDROCHLORIDE 75 MG: 20 INJECTION, SOLUTION INTRAVENOUS at 08:04

## 2018-04-12 RX ADMIN — MAGNESIUM SULFATE IN WATER 2 G: 40 INJECTION, SOLUTION INTRAVENOUS at 11:04

## 2018-04-12 RX ADMIN — METOPROLOL TARTRATE 1 MG: 5 INJECTION, SOLUTION INTRAVENOUS at 09:04

## 2018-04-12 RX ADMIN — ROCURONIUM BROMIDE 5 MG: 10 INJECTION, SOLUTION INTRAVENOUS at 08:04

## 2018-04-12 RX ADMIN — ONDANSETRON 4 MG: 4 TABLET, ORALLY DISINTEGRATING ORAL at 10:04

## 2018-04-12 RX ADMIN — METHYLPREDNISOLONE SODIUM SUCCINATE 10 MG: 40 INJECTION, POWDER, FOR SOLUTION INTRAMUSCULAR; INTRAVENOUS at 10:04

## 2018-04-12 RX ADMIN — PROPOFOL 100 MG: 10 INJECTION, EMULSION INTRAVENOUS at 08:04

## 2018-04-12 RX ADMIN — DEXTROSE 40 MG: 50 INJECTION, SOLUTION INTRAVENOUS at 10:04

## 2018-04-12 NOTE — PROGRESS NOTES
Received from PACU via stretcher to room 326. Wife at bedside. Oriented to room. VSS. Denies c/o pain. Monitor showing Paced rhythm.

## 2018-04-12 NOTE — NURSING TRANSFER
Nursing Transfer Note      4/12/2018     Transfer To: 326    Transfer via stretcher    Transfer with cardiac monitoring    Transported by PCT    Medicines sent: N/A    Chart send with patient: Yes    Notified: spouse    Patient reassessed at: 4/12/18 @ 1029

## 2018-04-12 NOTE — ASSESSMENT & PLAN NOTE
EKG pn 4/12 with A fib and LBBB  Echo with preserved normal LV diastolic and systolic function, SANDIE and mild AS  [facility]  Rate control with esmolol or metoprolol pushes   If candidate for anticoagulation, can DCCV in NSR

## 2018-04-12 NOTE — PT/OT/SLP PROGRESS
Speech Language Pathology      Elbert Connelly   [facility] 2ND FLR Periop Pool*    MRN: [id number]    SLP orders received and acknowledged. Thank you. Patient not seen today secondary to Unavailable (Comment) (Upon SLP attempt to see pt at 1424, pt CHINO for procedure. ). Will follow-up to complete evaluation 4/13/18 if medically stable and available.     LEROY Edouard, CCC-SLP  [phone]  4/12/2018

## 2018-04-12 NOTE — PROGRESS NOTES
Pt's spouse, Kalina Connelly, notified pt in recovery room, updated on pt status & POC. Notified of visitation hours/rules, spouse verbalizes understanding. Will continue to monitor.

## 2018-04-12 NOTE — ANESTHESIA RELEASE NOTE
"Anesthesia Release from PACU Note    Patient: Elbert Connelly    Procedure(s) Performed: Procedure(s) (LRB):  ULTRASOUND-ENDOSCOPIC-UPPER (N/A)    Anesthesia type: general    Post pain: Adequate analgesia    Post assessment: no evidence of recall    Last Vitals:   Visit Vitals  /87   Pulse (!) 93   Temp 36.6 °C (97.9 °F) (Temporal)   Resp 19   Ht 5' 7" (1.702 m)   Wt 88.8 kg (195 lb 12.3 oz)   SpO2 99%   BMI 30.66 kg/m²       Post vital signs: stable    Level of consciousness: awake, alert  and oriented    Nausea/Vomiting: no nausea/no vomiting    Complications: none    Airway Patency: patent    Respiratory: unassisted    Cardiovascular: stable and blood pressure at baseline    Hydration: euvolemic  "

## 2018-04-12 NOTE — ANESTHESIA POSTPROCEDURE EVALUATION
"Anesthesia Post Evaluation    Patient: Elbert Connelly    Procedure(s) Performed: Procedure(s) (LRB):  ULTRASOUND-ENDOSCOPIC-UPPER (N/A)    Final Anesthesia Type: general  Patient location during evaluation: PACU  Level of consciousness: awake and alert  Post-procedure vital signs: reviewed and stable  Pain management: adequate  Airway patency: patent  PONV status at discharge: No PONV  Anesthetic complications: yes  Perioperative Events: other periop events (see comments)  Farzana-operative Events Comments: New onset of SVT and hypotension.  Case cancellation.   Cardiovascular status: tachycardic and hemodynamically stable  Respiratory status: unassisted and spontaneous ventilation  Hydration status: euvolemic  Follow-up not needed.        Visit Vitals  /75   Pulse 89   Temp 36.7 °C (98 °F) (Temporal)   Resp 19   Ht 5' 7" (1.702 m)   Wt 88.8 kg (195 lb 12.3 oz)   SpO2 100%   BMI 30.66 kg/m²       Pain/Noris Score: Pain Assessment Performed: Yes (4/12/2018  2:15 PM)  Presence of Pain: non-verbal indicators absent (4/12/2018  2:15 PM)  Pain Rating Prior to Med Admin: 0 (4/12/2018 10:00 AM)  Noris Score: 9 (4/12/2018  2:15 PM)  Modified Noris Score: 19 (4/12/2018  7:50 AM)      "

## 2018-04-12 NOTE — PROGRESS NOTES
Dr. Gordon notified pt hypotensive, orders received to administer 500 cc bolus. Will continue to monitor.

## 2018-04-12 NOTE — HPI
Mr. Connelly is a WM with PMH of HTN, kidney transplant (in 1982 by Dr. Sow at [facility] [place] per pt for ESRD 2/2 uncontrolled HTN, he reports no issues of rejection or other txp complications, on prednisone 10 mg daily and azathioprine 150 mg daily, aortic aneurysm leading to stent (per pt >5-6 yrs ago), PE with IVC filter (per pt placed 5-6 yrs ago, was on warfarin but recently switched to Eliquis, which was held ~week ago) who presented to [facility] [place] because he was sent by his  nurse for evaluation of trouble swallowing that had gotten progressively worse. [facility]. Recently underwent an EGD on 4/6/18 which showed a dilated proximal esophagus with a distal ring/stricture, diffuse gastric edema/erythema with biopsies positive for gastric adenocarcinoma with infiltration of the lamina propria. Family reports he had not been able to keeps down any food or liquids or swallow his saliva lately. CT with concerns for lymph node metastases along lesser curve and diffuse thickening of distal esophagus, he was to under go EUS with bx. During induction, he became hypotensive and CPR was performed with unknown amount of time. He was given 10 mcg of epinephrine and 150 mg of amiodarone. He was transferred to PACU. Cardiology was consulted for evaluation of EKG, which showed wide complex SVT. Echo done 04/2018 with normal LV systolic and diastolic function, concentric hypertrophy, SANDIE, mild AS.

## 2018-04-12 NOTE — MEDICAL/APP STUDENT
Hospital Medicine  Progress note    Team: [facility] Ware  Admit Date: 4/10/2018  LIO 4/14/2018  Code status: Full Code    Principal Problem:  Esophageal obstruction    Interval hx:    4/11: H&P note  4/12 - Chaz: U/S endoscopic upper cancelled for cardiovascular complication, SR converted to ST after intubation with hypotension. Given phenylephrine 100mcg.       ROS     Pain Scale: 0 /10  Respiratory: no cough or shortness of breath  Cardiovascular: no chest pain or palpitations  Gastrointestinal: no nausea or vomiting, no abdominal pain or change in bowel habits  Behavioral/Psych: no depression or anxiety      PEx  Temp:  [96.8 °F (36 °C)-98.2 °F (36.8 °C)]   Pulse:  [71-80]   Resp:  [16-18]   BP: (137-178)/(69-85)   SpO2:  [96 %-98 %]     Intake/Output Summary (Last 24 hours) at 04/12/18 0656  Last data filed at 04/12/18 0500   Gross per 24 hour   Intake             1050 ml   Output                0 ml   Net             1050 ml       General Appearance: no acute distress   Heart: regular rate and rhythm  Respiratory: Normal respiratory effort, no crackles   Abdomen: Soft, non-tender; bowel sounds active  Skin: intact. IV sites ok  Neurologic:  No focal numbness or weakness  Mental status: Alert, oriented x 4, affect appropriate       Recent Labs  Lab 04/10/18  2005 04/11/18  0516 04/12/18  0427   WBC 5.20 4.08 3.19*   HGB 11.2* 9.5* 10.0*   HCT 33.1* 28.3* 29.9*    196 180       Recent Labs  Lab 04/10/18  2005 04/11/18  0516 04/12/18  0427    138 140   K 3.8 2.9* 3.6   CL 98 101 104   CO2 24 25 24   BUN 14 14 10   CREATININE 0.9 0.7 0.7   * 97 77   CALCIUM 9.0 8.3* 8.2*   MG  --  1.3*  --    PHOS  --  3.0  --    LIPASE 43  --   --    AMYLASE 37  --   --        Recent Labs  Lab 04/10/18  2005   ALKPHOS 47*   ALT 16   AST 25   ALBUMIN 2.9*   PROT 6.4   BILITOT 1.5*   INR 1.1        Recent Labs  Lab 04/10/18  1387   POCTGLUCOSE 112*     No results for input(s): REGLA LOZANO, MB,  TROPONINI in the last 72 hours.    Scheduled Meds:   acetaminophen  1,000 mg Intravenous Q12H    azaTHIOprine  150 mg Oral Daily    enoxaparin  40 mg Subcutaneous Daily    methylPREDNISolone sodium succinate  10 mg Intravenous Daily    pantoprazole 40 mg in dextrose 5 % 100 mL infusion (ready to mix system)  40 mg Intravenous BID     Continuous Infusions:  As Needed:  acetaminophen, albuterol-ipratropium 2.5mg-0.5mg/3mL, bisacodyl, dextrose 50%, dextrose 50%, glucagon (human recombinant), glucose, glucose, morphine, omnipaque, ondansetron, promethazine (PHENERGAN) IVPB, senna-docusate 8.6-50 mg, sodium chloride 0.9%    Active Hospital Problems    Diagnosis  POA    *Esophageal obstruction [K22.2]  Yes    Gastric carcinoma [C16.9]  Unknown    Weakness [R53.1]  Unknown    Esophageal spasm [K22.4]  Unknown    History of DVT (deep vein thrombosis) [Z86.718]  Not Applicable    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 May change pred to SM and hold  Aza for short period if unable to swallow.  -Follow with strict I/Os, daily weights, BP (goal <140/90), daily labs.    -Check immuknow cylex to better assess net IS.      Hypokalemia [E87.6]  Yes    Hypomagnesemia [E83.42]  Yes    Nonrheumatic aortic valve disorder [I35.9]  Unknown    Bilateral edema of lower extremity [R60.0]  Unknown    Dehydration [E86.0]  Unknown      Resolved Hospital Problems    Diagnosis Date Resolved POA   No resolved problems to display.       Overview: Mr. Elbert Connelly is a 67 y.o. male with medical problems including HTN, DVT/PE (IVC filter), renal disorder, and history of kidney transplant who presents with complaint of abdominal pain, fatigue, weakness, dizziness and esophageal  obstruction.       Assessment and Plan for Problems addressed today:  * Esophageal obstruction     Esophageal spasm  Gastric carcnoma  - AES consult for EGD/EUS for gastric cancer  - Surgical oncology consult and appreciate recs  - Will need documentation from OSH  - Unable to tolerate saliva but protecting airway.  - Strict NPO, aspiration precautions  - Not currently on anticoagulation- will need to be restarted on this admission  - LR mIVF x12 hours  - 4/11 - [facility]: NPO for procedure/surgery and aspiration concern, IV hydration and trend labs for electrolytes . CT thorax/abdomen/pelvis w/IV contrast for staging  EUS tomorrow pending CT results  SLP consult for recs re: meds/diet   Surg Onc consultation - paged 12 noon  4/12: U/S endoscopy upper cancelled for cardiovascular reasons          S/P kidney transplant (Stable)     One functioning transplanted kidney  - KTM consult  - Avoid nephrotoxic agents, including contrast  - will defer abdominal imaging decision for cancer to KTM/surg-onc team  - Cr. 0.9, lytes WNL  - Mag/Phos   - Will change prednisone 10 mg PO daily to soludmedrol 10 mg IV daily while unable to tolerate PO  - Wife able to give patient imuran 150 mg PO daily crushed up in pudding at home.  No IV available, per pharmacy. Continue PO Imuran as long as patient can tolerate.   4/11 - maumus: Stable functioning kidney Cr .7 BUN 14, continue PO Imuran as long as tolerated.           History of DVT (deep vein thrombosis)     H/o DVT and PE  - Currently not on AC.  Was on warfarin for years, but switched to eliquis.  Eliquis recently stopped d/t inability to tolerate PO.  Will need to be restarted on eliquis after EGD/surgical intreventions. Patient does not know dosing.   - IVC filter currently in place  - Currently in hypercoagulable state  4/11 - [facility]: Continue treatment with eliquis after intervention with swallowing difficulty.        Dehydration     2/2 esophageal obstruction  - IVF  replacement  4/11 - [facility]: Continue IV hydration       Bilateral edema of lower extremity     Chronic  BNP 65 in setting of obesity  - will order 2D ECHO  - no erythema, swelling appears equal          Weakness     PT/OT consult  4/11 - [facility]: PT/OT consult when ready from intervention of esophageal spasm and gastric cancer       Hypertension     Stable  - Continue losartan 50 mg PO daily, cardizem 60 mg PO TID when able to tolerate PO         Hypokalemia - replace        DVT PPx: enoxaparin        Discharge plan and follow up      Provider    I personally scribed for Dwight Ware MD on 04/12/2018 at 10:24 AM. Electronically signed by konstantin Thompson III on 04/12/2018 at 10:24 AM

## 2018-04-12 NOTE — CONSULTS
[facility]  Cardiology  Consult Note    Patient Name: Elbert Connelly  MRN: [id number]  Admission Date: 4/10/2018  Hospital Length of Stay: 2 days  Code Status: Full Code   Attending Provider: Dwight Ware MD   Consulting Provider: Silvestre Talley MD  Primary Care Physician: Primary Doctor No  Principal Problem:Atrial fibrillation    Patient information was obtained from patient.     Inpatient consult to Cardiology  Consult performed by: SILVESTRE TALLEY  Consult ordered by: GREGG BENOIT  Reason for consult: New onset SVT after induction        Subjective:     Chief Complaint: New onset SVT after induction    HPI:   Mr. Connelly is a WM with PMH of HTN, [place] [facility] [place] 2/2 uncontrolled HTN, he reports no issues of rejection or other txp complications, on prednisone 10 mg daily and azathioprine 150 mg daily, aortic aneurysm leading to stent (per pt >5-6 yrs ago), PE with IVC filter (per pt placed 5-6 yrs ago, was on warfarin but recently switched to Eliquis, which was held ~week ago) who [place] [facility] [place] sent by his  nurse for evaluation of trouble swallowing that had gotten progressively worse. [facility] the es[facility]. Recently underwent an EGD on 4/6/18 which showed a dilated proximal esophagus with a distal ring/stricture, diffuse gastric edema/erythema with biopsies positive for gastric adenocarcinoma with infiltration of the lamina propria. Family reports he had not been able to keeps down any food or liquids or swallow his saliva lately. CT with concerns for lymph node metastases along lesser curve and diffuse thickening of distal esophagus, he was to under go EUS with bx. During induction, he became hypotensive and CPR was performed with unknown amount of time. He was given 10 mcg of epinephrine and 150 mg of amiodarone. He was transferred to PACU. Cardiology was consulted for  evaluation of EKG, which showed wide complex SVT. Echo done 04/2018 with normal LV systolic and diastolic function, concentric hypertrophy, SANDIE, mild AS.     Past Medical History:   Diagnosis Date    Cancer     stomach    Hypertension     Living-donor kidney transplant (sister) 1982 4/11/2018    Renal disorder 1984    bilat kidney transplant        Past Surgical History:   Procedure Laterality Date    APPENDECTOMY      w cancer tumor removal    CHOLECYSTECTOMY      COLON SURGERY      removed during appendectomy to remove tumors    KIDNEY TRANSPLANT Bilateral 1984       Review of patient's allergies indicates:   Allergen Reactions    Allopurinol analogues        No current facility-administered medications on file prior to encounter.      No current outpatient prescriptions on file prior to encounter.     Family History     None        Social History Main Topics    Smoking status: Never Smoker    Smokeless tobacco: Never Used    Alcohol use No    Drug use: No    Sexual activity: No     Review of Systems   Constitution: Positive for decreased appetite, weakness and weight loss. Negative for diaphoresis and fever.   HENT: Negative for hoarse voice and sore throat.    Cardiovascular: Positive for dyspnea on exertion and leg swelling. Negative for chest pain and irregular heartbeat.   Psychiatric/Behavioral: Negative for substance abuse. The patient does not have insomnia and is not nervous/anxious.      Objective:     Vital Signs (Most Recent):  Temp: 97.9 °F (36.6 °C) (04/12/18 0909)  Pulse: (!) 139 (04/12/18 0945)  Resp: (!) 24 (04/12/18 0945)  BP: 114/79 (04/12/18 0945)  SpO2: 95 % (04/12/18 0945) Vital Signs (24h Range):  Temp:  [96.8 °F (36 °C)-98.2 °F (36.8 °C)] 97.9 °F (36.6 °C)  Pulse:  [] 139  Resp:  [16-25] 24  SpO2:  [95 %-100 %] 95 %  BP: (102-154)/(66-82) 114/79     Weight: 88.8 kg (195 lb 12.3 oz)  Body mass index is 30.66 kg/m².    SpO2: 95 %  O2 Device (Oxygen Therapy): room  air      Intake/Output Summary (Last 24 hours) at 04/12/18 1114  Last data filed at 04/12/18 0915   Gross per 24 hour   Intake             1850 ml   Output              250 ml   Net             1600 ml       Lines/Drains/Airways     Peripheral Intravenous Line                 External Jugular IV 04/10/18 2004 1 day         Peripheral IV - Single Lumen 04/10/18 2003 Left Hand 1 day                Physical Exam   Constitutional: He is oriented to person, place, and time. No distress.   Obese    HENT:   Head: Normocephalic and atraumatic.   Mouth/Throat: No oropharyngeal exudate.   Mild JVD   Eyes: Pupils are equal, round, and reactive to light.   Neck: No JVD present. No thyromegaly present.   Cardiovascular: Exam reveals no gallop and no friction rub.    No murmur heard.  Irregularly irregular tahycardia   Pulmonary/Chest: Effort normal and breath sounds normal. No respiratory distress. He has no wheezes.   Abdominal: Soft. Bowel sounds are normal. He exhibits distension.   Mild diffuse tenderness   Musculoskeletal: He exhibits edema (2+ b/l feet swelling).   Lymphadenopathy:     He has no cervical adenopathy.   Neurological: He is alert and oriented to person, place, and time.   Skin: Skin is warm and dry. No erythema. No pallor.   Psychiatric: He has a normal mood and affect. Judgment normal.   Nursing note and vitals reviewed.    Significant Labs:   CMP   Recent Labs  Lab 04/10/18  2005 04/11/18  0516 04/12/18  0427    138 140   K 3.8 2.9* 3.6   CL 98 101 104   CO2 24 25 24   * 97 77   BUN 14 14 10   CREATININE 0.9 0.7 0.7   CALCIUM 9.0 8.3* 8.2*   PROT 6.4  --   --    ALBUMIN 2.9*  --   --    BILITOT 1.5*  --   --    ALKPHOS 47*  --   --    AST 25  --   --    ALT 16  --   --    ANIONGAP 16 12 12   ESTGFRAFRICA >60.0 >60.0 >60.0   EGFRNONAA >60.0 >60.0 >60.0   , CBC   Recent Labs  Lab 04/10/18  2005 04/11/18  0516 04/12/18  0427   WBC 5.20 4.08 3.19*   HGB 11.2* 9.5* 10.0*   HCT 33.1* 28.3* 29.9*     196 180     All pertinent lab results from the last 24 hours have been reviewed.    Significant Imaging: All pertinent lab images from the last 24 hours have been reviewed.    Assessment and Plan:   Mr. Connelly is a 68 yo WM with undergoing evaluation for gastric adenocarcinoma extensive PMH who was found to be in A. Fib with LBBB after RSI    * Atrial fibrillation    EKG pn 4/12 with A fib and LBBB - converted to NSR   Echo with preserved normal LV diastolic and systolic function, SANDIE and mild AS  [facility]  Recommend hydration for suspected depleted intravascular volume   Proceed with repeat procedure when deemed appropriate            VTE Risk Mitigation         Ordered     enoxaparin injection 40 mg  Daily     Route:  Subcutaneous        04/10/18 2201     IP VTE HIGH RISK PATIENT  Once      04/10/18 2201     Place DERICK hose  Until discontinued      04/10/18 2201     Place sequential compression device  Until discontinued      04/10/18 2201          Thank you for your consult. I will sign off. Please contact us if you have any additional questions.    Lucille Dumas MD  Cardiology   [facility]

## 2018-04-12 NOTE — H&P
Pre-Procedure H and P Addendum    Patient seen and examined.  History and exam unchanged from prior history and physical.      Procedure: EGD/EUS  Indication: Gastric cancer  ASA Class: III  Airway: normal  Neck Mobility: full range of motion  Mallampatti score: per anesthesia  Anesthesia Plan: MAC    The impression and plan was discussed in detail with the patient. All questions have been answered and the patient voices understanding of our plan at this point. The risk of the procedure was discussed in detail which includes but not limited to bleeding, infection, perforation in some cases requiring surgery with its spectrum of complications.

## 2018-04-12 NOTE — PROGRESS NOTES
[facility]  General Surgery  Progress Note    Subjective:     History of Present Illness:  This is a 67 y.o. male with medical problems including HTN, DVT/PE (IVC filter), renal disorder, and history of kidney transplant who presents with complaint of abdominal pain, fatigue, weakness, dizziness and esophageal obstruction. Pt has been having difficulty tolerating PO intake since January. He has been in an out of the hospital multiple times over the past 3 months for similar issue.  Pt was told he has esophageal spasms and was esophagus is in a corkscrew fashion. Last week pt had EGD and biopsies taken at that appointment showed stomach cancer. Pt was discharged on Sunday and told to follow up with oncologist at [facility] and have general surgery see him, despite still unable to tolerate PO intake. Family reports he had not been able to keeps down any food or liquids or swallow his saliva since discharge. They state liquids stay down for a few minutes before he vomits it back up.  Today home health nurse saw the pt and stated they should report to the ED due to the pt's condition. Pt denies hematemesis, hematuria, and blood in stool.       Of note, patient does report urinary incontinence x3 weeks, chronic BLE edema, chronic SOB/MURO, easy fatigability.       In ED, given 1L NS, 1L D51/2 NS.  CXR clear, BNP 65.  Albumin 2.5. Lactate 2.1 HDS, 99% on RA.      Post-Op Info:  Procedure(s) (LRB):  ULTRASOUND-ENDOSCOPIC-UPPER (N/A)         Interval History: No acute events overnight. CT yesterday showed diffuse thickening around distal esophagus with nodule adjacent to lesser curve. NPO for EUS today.    Medications:  Continuous Infusions:  Scheduled Meds:   acetaminophen  1,000 mg Intravenous Q12H    azaTHIOprine  150 mg Oral Daily    enoxaparin  40 mg Subcutaneous Daily    methylPREDNISolone sodium succinate  10 mg Intravenous Daily    pantoprazole 40 mg in dextrose 5 % 100 mL infusion (ready to mix  system)  40 mg Intravenous BID     PRN Meds:acetaminophen, albuterol-ipratropium 2.5mg-0.5mg/3mL, bisacodyl, dextrose 50%, dextrose 50%, glucagon (human recombinant), glucose, glucose, morphine, omnipaque, ondansetron, promethazine (PHENERGAN) IVPB, senna-docusate 8.6-50 mg, sodium chloride 0.9%     Review of patient's allergies indicates:   Allergen Reactions    Allopurinol analogues      Objective:     Vital Signs (Most Recent):  Temp: 98 °F (36.7 °C) (04/12/18 0726)  Pulse: 72 (04/12/18 0726)  Resp: 16 (04/12/18 0726)  BP: (!) 154/82 (04/12/18 0726)  SpO2: 96 % (04/12/18 0726) Vital Signs (24h Range):  Temp:  [96.8 °F (36 °C)-98.2 °F (36.8 °C)] 98 °F (36.7 °C)  Pulse:  [71-80] 72  Resp:  [16-18] 16  SpO2:  [96 %-98 %] 96 %  BP: (137-178)/(69-85) 154/82     Weight: 88.8 kg (195 lb 12.3 oz)  Body mass index is 30.66 kg/m².    Intake/Output - Last 3 Shifts       04/10 0700 - 04/11 0659 04/11 0700 - 04/12 0659 04/12 0700 - 04/13 0659    P.O. 0 0     I.V. (mL/kg) 650 (6) 950 (10.7)     IV Piggyback 1000 100     Total Intake(mL/kg) 1650 (15.2) 1050 (11.8)     Urine (mL/kg/hr) 250      Total Output 250        Net +1400 +1050             Urine Occurrence  2 x           Physical Exam   Constitutional: He is oriented to person, place, and time. He appears well-developed and well-nourished.   HENT:   Head: Normocephalic and atraumatic.   Cardiovascular: Normal rate.    Pulmonary/Chest: Effort normal.   Abdominal: Soft.   Neurological: He is alert and oriented to person, place, and time.   Skin: Skin is warm and dry.   Vitals reviewed.      Significant Labs:  CBC:   Recent Labs  Lab 04/12/18 0427   WBC 3.19*   RBC 3.09*   HGB 10.0*   HCT 29.9*      MCV 97   MCH 32.4*   MCHC 33.4     CMP:   Recent Labs  Lab 04/10/18  2005  04/12/18  0427   *  < > 77   CALCIUM 9.0  < > 8.2*   ALBUMIN 2.9*  --   --    PROT 6.4  --   --      < > 140   K 3.8  < > 3.6   CO2 24  < > 24   CL 98  < > 104   BUN 14  < > 10    CREATININE 0.9  < > 0.7   ALKPHOS 47*  --   --    ALT 16  --   --    AST 25  --   --    BILITOT 1.5*  --   --    < > = values in this interval not displayed.    Significant Diagnostics:  I have reviewed all pertinent imaging results/findings within the past 24 hours.    Assessment/Plan:     Gastric carcinoma    66yo male with biopsy positive adenocarcinoma of the stomach and thickening of the distal esophagus    - NPO for EUS today, CT with concerns for lymph node metastases along lesser curve and diffuse thickening of distal esophagus, will follow up results for surgical planning. Most likely will require neoadjuvant therapy with prehab prior to surgical intervention.  - prealbumin 8 yesterday, consider jejunostomy tube placement vs TPN- will discuss with staff  - will continue to follow along            Deloris Toussaint MD  General Surgery  [facility]

## 2018-04-12 NOTE — PLAN OF CARE
"Attempted to see pt but pt was in US; family at bedside reports that the procedure was cancelled 2/2 "fast HR"    Per chart check, pt developed sinus tach + hypotension once intubated for procedure - procedure aborted.      EKG with afib    Would continue to be aggressive with IV replacement of electrolytes including K/Mg especially in face of arrythmias    Renal function continues to remain stable; have ordered cylex.  Will continue to follow with primary team.      "

## 2018-04-12 NOTE — PROGRESS NOTES
Hospital Medicine  Progress note    I personally performed the history, physical exam and medical decision making: and confirmed the accuracy of the information in the transcribed note.    Team: [facility] MED [facility]  Admit Date: 4/10/2018  LIO 4/14/2018  Code status: Full Code    Principal Problem:  Esophageal obstruction    Interval hx:    4/11: H&P note  4/12 - Chaz: U/S endoscopic upper cancelled for cardiovascular complication, SR converted to ST after intubation with hypotension. Given phenylephrine 100mcg.       ROS     Pain Scale: 0 /10  Respiratory: no cough or shortness of breath  Cardiovascular: no chest pain or palpitations  Gastrointestinal: no nausea or vomiting, no abdominal pain or change in bowel habits  Behavioral/Psych: no depression or anxiety      PEx  Temp:  [96.8 °F (36 °C)-98.2 °F (36.8 °C)]   Pulse:  []   Resp:  [16-25]   BP: ()/(61-87)   SpO2:  [95 %-100 %]     Intake/Output Summary (Last 24 hours) at 04/12/18 1126  Last data filed at 04/12/18 0915   Gross per 24 hour   Intake             1850 ml   Output              250 ml   Net             1600 ml       General Appearance: no acute distress   Heart: regular rate and rhythm  Respiratory: Normal respiratory effort, no crackles   Abdomen: Soft, non-tender; bowel sounds active  Skin: intact. IV sites ok  Neurologic:  No focal numbness or weakness  Mental status: Alert, oriented x 4, affect appropriate       Recent Labs  Lab 04/10/18  2005 04/11/18  0516 04/12/18  0427   WBC 5.20 4.08 3.19*   HGB 11.2* 9.5* 10.0*   HCT 33.1* 28.3* 29.9*    196 180       Recent Labs  Lab 04/10/18  2005 04/11/18  0516 04/12/18 0427    138 140   K 3.8 2.9* 3.6   CL 98 101 104   CO2 24 25 24   BUN 14 14 10   CREATININE 0.9 0.7 0.7   * 97 77   CALCIUM 9.0 8.3* 8.2*   MG  --  1.3*  --    PHOS  --  3.0  --    LIPASE 43  --   --    AMYLASE 37  --   --        Recent Labs  Lab 04/10/18  2005   ALKPHOS 47*   ALT 16   AST 25    ALBUMIN 2.9*   PROT 6.4   BILITOT 1.5*   INR 1.1        Recent Labs  Lab 04/10/18  2357   POCTGLUCOSE 112*     No results for input(s): CPK, CPKMB, MB, TROPONINI in the last 72 hours.    Scheduled Meds:   acetaminophen  1,000 mg Intravenous Q12H    azaTHIOprine  150 mg Oral Daily    enoxaparin  40 mg Subcutaneous Daily    methylPREDNISolone sodium succinate  10 mg Intravenous Daily    pantoprazole 40 mg in dextrose 5 % 100 mL infusion (ready to mix system)  40 mg Intravenous BID    phenylephrine HCl in 0.9% NaCl  100 mcg Intravenous Once    phenylephrine HCl in 0.9% NaCl         Continuous Infusions:  As Needed:  acetaminophen, albuterol-ipratropium 2.5mg-0.5mg/3mL, bisacodyl, dextrose 50%, dextrose 50%, glucagon (human recombinant), glucose, glucose, metoprolol, morphine, omnipaque, ondansetron, promethazine (PHENERGAN) IVPB, senna-docusate 8.6-50 mg, sodium chloride 0.9%, sodium chloride 0.9%    Active Hospital Problems    Diagnosis  POA    *Esophageal obstruction [K22.2]  Yes    Atrial fibrillation [I48.91]  Unknown    Gastric carcinoma [C16.9]  Unknown    Weakness [R53.1]  Unknown    Esophageal spasm [K22.4]  Unknown    History of DVT (deep vein thrombosis) [Z86.718]  Not Applicable    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 May change pred to SM and hold  Aza for short period if unable to swallow.  -Follow with strict I/Os, daily weights, BP (goal <140/90), daily labs.    -Check immuknow cylex to better assess net IS.      Hypokalemia [E87.6]  Yes    Hypomagnesemia [E83.42]  Yes    Nonrheumatic aortic valve disorder [I35.9]  Unknown    Bilateral edema of lower extremity [R60.0]  Unknown    Dehydration [E86.0]  Unknown      Resolved Hospital  Problems    Diagnosis Date Resolved POA   No resolved problems to display.       Overview: Mr. Elbert Connelly is a 67 y.o. male with medical problems including HTN, DVT/PE (IVC filter), renal disorder, and history of kidney transplant who presents with complaint of abdominal pain, fatigue, weakness, dizziness and esophageal obstruction.       Assessment and Plan for Problems addressed today:  * Esophageal obstruction     Esophageal spasm  Gastric carcnoma  - AES consult for EGD/EUS for gastric cancer  - Surgical oncology consult and appreciate recs  - Will need documentation from OSH  - Unable to tolerate saliva but protecting airway.  - Strict NPO, aspiration precautions  - Not currently on anticoagulation- will need to be restarted on this admission  - LR mIVF x12 hours  - 4/11 - Maumus: NPO for procedure/surgery and aspiration concern, IV hydration and trend labs for electrolytes . CT thorax/abdomen/pelvis w/IV contrast for staging  EUS tomorrow pending CT results  SLP consult for recs re: meds/diet   Surg Onc consultation - paged 12 noon  4/12: U/S endoscopy upper cancelled for cardiovascular reasons          S/P kidney transplant (Stable)     One functioning transplanted kidney  - KTM consult  - Avoid nephrotoxic agents, including contrast  - will defer abdominal imaging decision for cancer to KTM/surg-onc team  - Cr. 0.9, lytes WNL  - Mag/Phos   - Will change prednisone 10 mg PO daily to soludmedrol 10 mg IV daily while unable to tolerate PO  - Wife able to give patient imuran 150 mg PO daily crushed up in pudding at home.  No IV available, per pharmacy. Continue PO Imuran as long as patient can tolerate.   4/11 - maumus: Stable functioning kidney Cr .7 BUN 14, continue PO Imuran as long as tolerated.           History of DVT (deep vein thrombosis)     H/o DVT and PE  - Currently not on AC.  Was on warfarin for years, but switched to eliquis.  Eliquis recently stopped d/t inability to tolerate PO.  Will need to  be restarted on eliquis after EGD/surgical intreventions. Patient does not know dosing.   - IVC filter currently in place  - Currently in hypercoagulable state  4/11 - Maumus: Continue treatment with eliquis after intervention with swallowing difficulty.        Dehydration     2/2 esophageal obstruction  - IVF replacement  4/11 - Maumus: Continue IV hydration       Bilateral edema of lower extremity     Chronic  BNP 64 in setting of obesity  - will order 2D ECHO  - no erythema, swelling appears equal          Weakness     PT/OT consult  4/11 - Maumus: PT/OT consult when ready from intervention of esophageal spasm and gastric cancer       Hypertension     Stable  - Continue losartan 50 mg PO daily, cardizem 60 mg PO TID when able to tolerate PO     Atrial fibrillation with RVR   Will give IV lopressor   Conisder Diltiazem   Cardiology Consult     Hypokalemia - replace        DVT PPx: enoxaparin        Discharge plan and follow up      Provider    I personally scribed for Dwight Ware MD on 04/12/2018 at 10:24 AM. Electronically signed by konstantin Thompson III on 04/12/2018 at 10:24 AM

## 2018-04-12 NOTE — SUBJECTIVE & OBJECTIVE
Interval History: No acute events overnight. CT yesterday showed diffuse thickening around distal esophagus with nodule adjacent to lesser curve. NPO for EUS today.    Medications:  Continuous Infusions:  Scheduled Meds:   acetaminophen  1,000 mg Intravenous Q12H    azaTHIOprine  150 mg Oral Daily    enoxaparin  40 mg Subcutaneous Daily    methylPREDNISolone sodium succinate  10 mg Intravenous Daily    pantoprazole 40 mg in dextrose 5 % 100 mL infusion (ready to mix system)  40 mg Intravenous BID     PRN Meds:acetaminophen, albuterol-ipratropium 2.5mg-0.5mg/3mL, bisacodyl, dextrose 50%, dextrose 50%, glucagon (human recombinant), glucose, glucose, morphine, omnipaque, ondansetron, promethazine (PHENERGAN) IVPB, senna-docusate 8.6-50 mg, sodium chloride 0.9%     Review of patient's allergies indicates:   Allergen Reactions    Allopurinol analogues      Objective:     Vital Signs (Most Recent):  Temp: 98 °F (36.7 °C) (04/12/18 0726)  Pulse: 72 (04/12/18 0726)  Resp: 16 (04/12/18 0726)  BP: (!) 154/82 (04/12/18 0726)  SpO2: 96 % (04/12/18 0726) Vital Signs (24h Range):  Temp:  [96.8 °F (36 °C)-98.2 °F (36.8 °C)] 98 °F (36.7 °C)  Pulse:  [71-80] 72  Resp:  [16-18] 16  SpO2:  [96 %-98 %] 96 %  BP: (137-178)/(69-85) 154/82     Weight: 88.8 kg (195 lb 12.3 oz)  Body mass index is 30.66 kg/m².    Intake/Output - Last 3 Shifts       04/10 0700 - 04/11 0659 04/11 0700 - 04/12 0659 04/12 0700 - 04/13 0659    P.O. 0 0     I.V. (mL/kg) 650 (6) 950 (10.7)     IV Piggyback 1000 100     Total Intake(mL/kg) 1650 (15.2) 1050 (11.8)     Urine (mL/kg/hr) 250      Total Output 250        Net +1400 +1050             Urine Occurrence  2 x           Physical Exam   Constitutional: He is oriented to person, place, and time. He appears well-developed and well-nourished.   HENT:   Head: Normocephalic and atraumatic.   Cardiovascular: Normal rate.    Pulmonary/Chest: Effort normal.   Abdominal: Soft.   Neurological: He is alert and  oriented to person, place, and time.   Skin: Skin is warm and dry.   Vitals reviewed.      Significant Labs:  CBC:   Recent Labs  Lab 04/12/18 0427   WBC 3.19*   RBC 3.09*   HGB 10.0*   HCT 29.9*      MCV 97   MCH 32.4*   MCHC 33.4     CMP:   Recent Labs  Lab 04/10/18  2005  04/12/18  0427   *  < > 77   CALCIUM 9.0  < > 8.2*   ALBUMIN 2.9*  --   --    PROT 6.4  --   --      < > 140   K 3.8  < > 3.6   CO2 24  < > 24   CL 98  < > 104   BUN 14  < > 10   CREATININE 0.9  < > 0.7   ALKPHOS 47*  --   --    ALT 16  --   --    AST 25  --   --    BILITOT 1.5*  --   --    < > = values in this interval not displayed.    Significant Diagnostics:  I have reviewed all pertinent imaging results/findings within the past 24 hours.

## 2018-04-12 NOTE — PT/OT/SLP PROGRESS
Physical Therapy      Patient Name:  Elbert Connelly   MRN:  [id number]    Patient not seen today secondary to patient transferred to ICU. Need new orders for PT when appropriate. Will follow-up at appropriate date.    Stefania Chawla, PT

## 2018-04-12 NOTE — SUBJECTIVE & OBJECTIVE
Past Medical History:   Diagnosis Date    Cancer     stomach    Hypertension     Living-donor kidney transplant (sister) 1982 4/11/2018    Renal disorder 1984    bilat kidney transplant        Past Surgical History:   Procedure Laterality Date    APPENDECTOMY      w cancer tumor removal    CHOLECYSTECTOMY      COLON SURGERY      removed during appendectomy to remove tumors    KIDNEY TRANSPLANT Bilateral 1984       Review of patient's allergies indicates:   Allergen Reactions    Allopurinol analogues        No current facility-administered medications on file prior to encounter.      No current outpatient prescriptions on file prior to encounter.     Family History     None        Social History Main Topics    Smoking status: Never Smoker    Smokeless tobacco: Never Used    Alcohol use No    Drug use: No    Sexual activity: No     Review of Systems   Constitution: Positive for decreased appetite, weakness and weight loss. Negative for diaphoresis and fever.   HENT: Negative for hoarse voice and sore throat.    Cardiovascular: Positive for dyspnea on exertion and leg swelling. Negative for chest pain and irregular heartbeat.   Psychiatric/Behavioral: Negative for substance abuse. The patient does not have insomnia and is not nervous/anxious.      Objective:     Vital Signs (Most Recent):  Temp: 97.9 °F (36.6 °C) (04/12/18 0909)  Pulse: (!) 139 (04/12/18 0945)  Resp: (!) 24 (04/12/18 0945)  BP: 114/79 (04/12/18 0945)  SpO2: 95 % (04/12/18 0945) Vital Signs (24h Range):  Temp:  [96.8 °F (36 °C)-98.2 °F (36.8 °C)] 97.9 °F (36.6 °C)  Pulse:  [] 139  Resp:  [16-25] 24  SpO2:  [95 %-100 %] 95 %  BP: (102-154)/(66-82) 114/79     Weight: 88.8 kg (195 lb 12.3 oz)  Body mass index is 30.66 kg/m².    SpO2: 95 %  O2 Device (Oxygen Therapy): room air      Intake/Output Summary (Last 24 hours) at 04/12/18 1114  Last data filed at 04/12/18 0915   Gross per 24 hour   Intake             1850 ml   Output               250 ml   Net             1600 ml       Lines/Drains/Airways     Peripheral Intravenous Line                 External Jugular IV 04/10/18 2004 1 day         Peripheral IV - Single Lumen 04/10/18 2003 Left Hand 1 day                Physical Exam   Constitutional: He is oriented to person, place, and time. No distress.   Obese    HENT:   Head: Normocephalic and atraumatic.   Mouth/Throat: No oropharyngeal exudate.   Mild JVD   Eyes: Pupils are equal, round, and reactive to light.   Neck: No JVD present. No thyromegaly present.   Cardiovascular: Exam reveals no gallop and no friction rub.    No murmur heard.  Irregularly irregular tahycardia   Pulmonary/Chest: Effort normal and breath sounds normal. No respiratory distress. He has no wheezes.   Abdominal: Soft. Bowel sounds are normal. He exhibits distension.   Mild diffuse tenderness   Musculoskeletal: He exhibits edema (2+ b/l feet swelling).   Lymphadenopathy:     He has no cervical adenopathy.   Neurological: He is alert and oriented to person, place, and time.   Skin: Skin is warm and dry. No erythema. No pallor.   Psychiatric: He has a normal mood and affect. Judgment normal.   Nursing note and vitals reviewed.    Significant Labs:   CMP   Recent Labs  Lab 04/10/18  2005 04/11/18  0516 04/12/18  0427    138 140   K 3.8 2.9* 3.6   CL 98 101 104   CO2 24 25 24   * 97 77   BUN 14 14 10   CREATININE 0.9 0.7 0.7   CALCIUM 9.0 8.3* 8.2*   PROT 6.4  --   --    ALBUMIN 2.9*  --   --    BILITOT 1.5*  --   --    ALKPHOS 47*  --   --    AST 25  --   --    ALT 16  --   --    ANIONGAP 16 12 12   ESTGFRAFRICA >60.0 >60.0 >60.0   EGFRNONAA >60.0 >60.0 >60.0   , CBC   Recent Labs  Lab 04/10/18  2005 04/11/18  0516 04/12/18  0427   WBC 5.20 4.08 3.19*   HGB 11.2* 9.5* 10.0*   HCT 33.1* 28.3* 29.9*    196 180     All pertinent lab results from the last 24 hours have been reviewed.    Significant Imaging: All pertinent lab images from the last 24 hours have been  reviewed.

## 2018-04-12 NOTE — ASSESSMENT & PLAN NOTE
66yo male with biopsy positive adenocarcinoma of the stomach and thickening of the distal esophagus    - NPO for EUS today, CT with concerns for lymph node metastases along lesser curve and diffuse thickening of distal esophagus, will follow up results for surgical planning. Most likely will require neoadjuvant therapy with prehab prior to surgical intervention.  - prealbumin 8 yesterday, consider jejunostomy tube placement vs TPN- will discuss with staff  - will continue to follow along

## 2018-04-12 NOTE — PT/OT/SLP PROGRESS
Occupational Therapy      Patient Name:  Elbert Connelly   MRN:  [id number]    Patient not seen today secondary to  (T/F to ICU following procedure). Evaluation orders d/c, to be re-ordered when appropriate.     [place] OT  4/12/2018

## 2018-04-12 NOTE — TRANSFER OF CARE
"Anesthesia Transfer of Care Note    Patient: Elbert Connelly    Procedure(s) Performed: Procedure(s) (LRB):  ULTRASOUND-ENDOSCOPIC-UPPER (N/A)    Patient location: PACU    Anesthesia Type: general    Transport from OR: Transported from OR on 6-10 L/min O2 by face mask with adequate spontaneous ventilation    Post pain: adequate analgesia    Post vital signs: stable    Level of consciousness: awake, alert and oriented    Nausea/Vomiting: no nausea/vomiting    Complications: cardiovascular complications, SR converted to ST after intubation with hypotension    Transfer of care protocol was followed      Last vitals:   Visit Vitals  /66 (BP Location: Left arm, Patient Position: Lying)   Pulse (!) 128   Temp 36.6 °C (97.9 °F) (Oral)   Resp 20   Ht 5' 7" (1.702 m)   Wt 88.8 kg (195 lb 12.3 oz)   SpO2 100%   BMI 30.66 kg/m²     "

## 2018-04-13 ENCOUNTER — ANESTHESIA EVENT (OUTPATIENT)
Dept: ENDOSCOPY | Facility: HOSPITAL | Age: 68
End: 2018-04-13

## 2018-04-13 LAB
ANION GAP SERPL CALC-SCNC: 14 MMOL/L
BASOPHILS # BLD AUTO: 0.01 K/UL
BASOPHILS NFR BLD: 0.2 %
BUN SERPL-MCNC: 14 MG/DL
CALCIUM SERPL-MCNC: 8.3 MG/DL
CHLORIDE SERPL-SCNC: 104 MMOL/L
CO2 SERPL-SCNC: 22 MMOL/L
CREAT SERPL-MCNC: 0.6 MG/DL
DIFFERENTIAL METHOD: ABNORMAL
EOSINOPHIL # BLD AUTO: 0 K/UL
EOSINOPHIL NFR BLD: 0.4 %
ERYTHROCYTE [DISTWIDTH] IN BLOOD BY AUTOMATED COUNT: 18.1 %
EST. GFR  (AFRICAN AMERICAN): >60 ML/MIN/1.73 M^2
EST. GFR  (NON AFRICAN AMERICAN): >60 ML/MIN/1.73 M^2
GLUCOSE SERPL-MCNC: 97 MG/DL
HCT VFR BLD AUTO: 29.7 %
HGB BLD-MCNC: 10.1 G/DL
IMM GRANULOCYTES # BLD AUTO: 0.04 K/UL
IMM GRANULOCYTES NFR BLD AUTO: 0.9 %
LYMPHOCYTES # BLD AUTO: 0.7 K/UL
LYMPHOCYTES NFR BLD: 15.6 %
MAGNESIUM SERPL-MCNC: 1.4 MG/DL
MCH RBC QN AUTO: 32.4 PG
MCHC RBC AUTO-ENTMCNC: 34 G/DL
MCV RBC AUTO: 95 FL
MONOCYTES # BLD AUTO: 0.1 K/UL
MONOCYTES NFR BLD: 1.8 %
NEUTROPHILS # BLD AUTO: 3.7 K/UL
NEUTROPHILS NFR BLD: 81.1 %
NRBC BLD-RTO: 0 /100 WBC
PLATELET # BLD AUTO: 214 K/UL
PMV BLD AUTO: 10 FL
POCT GLUCOSE: 112 MG/DL (ref 70–110)
POTASSIUM SERPL-SCNC: 4.1 MMOL/L
RBC # BLD AUTO: 3.12 M/UL
SODIUM SERPL-SCNC: 140 MMOL/L
WBC # BLD AUTO: 4.56 K/UL

## 2018-04-13 PROCEDURE — 92610 EVALUATE SWALLOWING FUNCTION: CPT

## 2018-04-13 PROCEDURE — G8996 SWALLOW CURRENT STATUS: HCPCS | Mod: CN

## 2018-04-13 PROCEDURE — 85025 COMPLETE CBC W/AUTO DIFF WBC: CPT

## 2018-04-13 PROCEDURE — G8997 SWALLOW GOAL STATUS: HCPCS | Mod: CM

## 2018-04-13 PROCEDURE — 20600001 HC STEP DOWN PRIVATE ROOM

## 2018-04-13 PROCEDURE — 97161 PT EVAL LOW COMPLEX 20 MIN: CPT

## 2018-04-13 PROCEDURE — 80048 BASIC METABOLIC PNL TOTAL CA: CPT

## 2018-04-13 PROCEDURE — S0077 INJECTION, CLINDAMYCIN PHOSP: HCPCS | Performed by: PHYSICIAN ASSISTANT

## 2018-04-13 PROCEDURE — 63600175 PHARM REV CODE 636 W HCPCS: Performed by: NURSE PRACTITIONER

## 2018-04-13 PROCEDURE — 99232 SBSQ HOSP IP/OBS MODERATE 35: CPT | Mod: GC,,, | Performed by: INTERNAL MEDICINE

## 2018-04-13 PROCEDURE — 99232 SBSQ HOSP IP/OBS MODERATE 35: CPT | Mod: ,,, | Performed by: INTERNAL MEDICINE

## 2018-04-13 PROCEDURE — 25000003 PHARM REV CODE 250: Performed by: PHYSICIAN ASSISTANT

## 2018-04-13 PROCEDURE — 83735 ASSAY OF MAGNESIUM: CPT

## 2018-04-13 PROCEDURE — C9113 INJ PANTOPRAZOLE SODIUM, VIA: HCPCS | Performed by: PHYSICIAN ASSISTANT

## 2018-04-13 PROCEDURE — 63600175 PHARM REV CODE 636 W HCPCS: Performed by: PHYSICIAN ASSISTANT

## 2018-04-13 PROCEDURE — 36415 COLL VENOUS BLD VENIPUNCTURE: CPT

## 2018-04-13 RX ORDER — MORPHINE SULFATE 4 MG/ML
2 INJECTION, SOLUTION INTRAMUSCULAR; INTRAVENOUS EVERY 4 HOURS PRN
Status: COMPLETED | OUTPATIENT
Start: 2018-04-13 | End: 2018-04-14

## 2018-04-13 RX ORDER — CLINDAMYCIN PHOSPHATE 600 MG/50ML
600 INJECTION, SOLUTION INTRAVENOUS
Status: DISCONTINUED | OUTPATIENT
Start: 2018-04-13 | End: 2018-04-19 | Stop reason: HOSPADM

## 2018-04-13 RX ORDER — ACETAMINOPHEN 10 MG/ML
1000 INJECTION, SOLUTION INTRAVENOUS ONCE
Status: COMPLETED | OUTPATIENT
Start: 2018-04-13 | End: 2018-04-13

## 2018-04-13 RX ADMIN — DEXTROSE 40 MG: 50 INJECTION, SOLUTION INTRAVENOUS at 08:04

## 2018-04-13 RX ADMIN — ONDANSETRON 4 MG: 4 TABLET, ORALLY DISINTEGRATING ORAL at 04:04

## 2018-04-13 RX ADMIN — PROMETHAZINE HYDROCHLORIDE 6.25 MG: 25 INJECTION INTRAMUSCULAR; INTRAVENOUS at 06:04

## 2018-04-13 RX ADMIN — CLINDAMYCIN IN 5 PERCENT DEXTROSE 600 MG: 12 INJECTION, SOLUTION INTRAVENOUS at 10:04

## 2018-04-13 RX ADMIN — ONDANSETRON 4 MG: 4 TABLET, ORALLY DISINTEGRATING ORAL at 11:04

## 2018-04-13 RX ADMIN — AZATHIOPRINE 150 MG: 50 TABLET ORAL at 09:04

## 2018-04-13 RX ADMIN — MORPHINE SULFATE 2 MG: 4 INJECTION INTRAVENOUS at 10:04

## 2018-04-13 RX ADMIN — ACETAMINOPHEN 1000 MG: 10 INJECTION, SOLUTION INTRAVENOUS at 07:04

## 2018-04-13 RX ADMIN — DEXTROSE 40 MG: 50 INJECTION, SOLUTION INTRAVENOUS at 09:04

## 2018-04-13 RX ADMIN — METHYLPREDNISOLONE SODIUM SUCCINATE 10 MG: 40 INJECTION, POWDER, FOR SOLUTION INTRAMUSCULAR; INTRAVENOUS at 09:04

## 2018-04-13 NOTE — PLAN OF CARE
Problem: Physical Therapy Goal  Goal: Physical Therapy Goal  Goals to be met by: 2018     Patient will increase functional independence with mobility by performin. Supine to sit with Contact Guard Assistance.  2. Rolling to Left and Right with Stand-by Assistance.  3. Sit to stand transfer with Contact Guard Assistance with appropriate AD.  4. Bed to chair transfer with Contact Guard Assistance with appropriate AD.  5. Gait  x 100 feet with Stand-by Assistance with appropriate AD.  6. Lower extremity exercise program x 20 reps per handout, with supervision.    Outcome: Ongoing (interventions implemented as appropriate)  Goals set today.

## 2018-04-13 NOTE — ANESTHESIA PREPROCEDURE EVALUATION
[facility]  Anesthesia Pre-Operative Evaluation         Patient Name: Elbert Connelly  YOB: 1950  MRN: [id number]    SUBJECTIVE:     Pre-operative evaluation for Procedure(s) (LRB):  ULTRASOUND-ENDOSCOPIC-UPPER (N/A)     04/13/2018    Elbert Connelly is a 67 y.o. male w/ a significant PMHx of HTN, kidney transplant (1982 ESR 2/2 uncontrolled HTN, prednisone, azathioprine), aortic aneurysm w/ stent (per pt >5-6 yrs ago), PE with IVC filter (per pt placed 5-6 yrs ago, was on warfarin but recently switched to Eliquis, which was held ~week ago). Presented to [facility] because he was sent by his  nurse for evaluation of trouble swallowing that had gotten progressively worse. [facility] to [facility] esophagus.     Recently underwent an EGD on 04/06/18 which showed a dilated proximal esophagus with a distal ring/stricture, diffuse gastric edema/erythema with biopsies positive for gastric adenocarcinoma with infiltration of the lamina propria. CT with concerns for lymph node metastases along lesser curve and diffuse thickening of distal esophagus,     He was scheduled to undergo EUS w/ Bx 04/12/18, however, during induction, he became hypotensive and CPR was performed for unknown amount of time. He was given 10 mcg of epinephrine and 150 mg of amiodarone. He was transferred to PACU. Cardiology was consulted for evaluation of EKG, which showed wide complex SVT.      Patient now presents for the above procedure(s).      LDA:       Peripheral IV - Single Lumen 04/10/18 2003 Left Hand (Active) 20G   Site Assessment Clean;Dry;Intact 4/12/2018  7:50 PM   Line Status Flushed;Saline locked 4/12/2018  7:50 PM   Dressing Status Clean;Dry;Intact 4/12/2018  7:50 PM   Site Change Due 04/14/18 4/12/2018  7:50 PM   Reason Not Rotated Not due 4/12/2018  7:50 PM   Number of days: 2            External Jugular IV 04/10/18 2004 (Active) 18G   Site Assessment  Clean;Dry;Intact 4/13/2018  8:00 AM   Line Status Flushed 4/12/2018  7:50 PM   Dressing Status Clean;Dry;Intact 4/12/2018  7:50 PM   Dressing Change Due 04/14/18 4/12/2018  7:50 PM   Reason Not Rotated Not due 4/12/2018  7:50 PM   Number of days: 2       Prev airway: Present Prior to Hospital Arrival?: No; Placement Date: 04/12/18; Placement Time: 0839; Method of Intubation: Rapid Sequence Induction, [place]; Inserted by: CRNA; Airway Device: Endotracheal Tube; Mask Ventilation: Not Attempted; Intubated: Postinduction; Blade: Susu #3; Airway Device Size: 7.5; Style: Cuffed; Cuff Inflation: Minimal occlusive pressure; Inflation Amount: 10; Placement Verified By: Auscultation, Capnometry, ETT Condensation; Grade: Grade I; Complicating Factors: None; Intubation Findings: Positive EtCO2, Bilateral breath sounds, Atraumatic/Condition of teeth unchanged;  Depth of Insertion: 22; Securment: Lips; Complications: None; Breath Sounds: Equal Bilateral; Insertion Attempts: 1; Removal Date: 04/12/18;  Removal Time: 0902.    Drips: None documented.      Patient Active Problem List   Diagnosis    Esophageal obstruction    Bilateral edema of lower extremity    Dehydration    Abdominal pain    Gastric carcinoma    Weakness    Esophageal spasm    History of DVT (deep vein thrombosis)    Living-donor kidney transplant (sister) 1982    Hypertension    Nonrheumatic aortic valve disorder    Chronic kidney disease (CKD), stage II (mild)    Prophylactic immunotherapy    Hypokalemia    Hypomagnesemia    Atrial fibrillation       Review of patient's allergies indicates:   Allergen Reactions    Allopurinol analogues        Current Inpatient Medications:   azaTHIOprine  150 mg Oral Daily    diltiaZEM  10 mg Intravenous Once    methylPREDNISolone sodium succinate  10 mg Intravenous Daily    pantoprazole 40 mg in dextrose 5 % 100 mL infusion (ready to mix system)  40 mg Intravenous BID       No current  facility-administered medications on file prior to encounter.      No current outpatient prescriptions on file prior to encounter.       Past Surgical History:   Procedure Laterality Date    APPENDECTOMY      w cancer tumor removal    CHOLECYSTECTOMY      COLON SURGERY      removed during appendectomy to remove tumors    KIDNEY TRANSPLANT Bilateral 1984       Social History     Social History    Marital status:      Spouse name: N/A    Number of children: N/A    Years of education: N/A     Occupational History    Not on file.     Social History Main Topics    Smoking status: Never Smoker    Smokeless tobacco: Never Used    Alcohol use No    Drug use: No    Sexual activity: No     Other Topics Concern    Not on file     Social History Narrative    No narrative on file       OBJECTIVE:     Vital Signs Range (Last 24H):  Temp:  [36.4 °C (97.6 °F)-37 °C (98.6 °F)]   Pulse:  [66-92]   Resp:  [16-19]   BP: (127-160)/(72-90)   SpO2:  [91 %-100 %]       CBC:   Recent Labs      04/12/18   1112 04/13/18   0545   WBC  3.67*  4.56   RBC  3.01*  3.12*   HGB  9.6*  10.1*   HCT  28.2*  29.7*   PLT  151  214   MCV  94  95   MCH  31.9*  32.4*   MCHC  34.0  34.0       CMP:   Recent Labs      04/10/18   2005   04/12/18   1112  04/12/18   2029  04/13/18   0545   NA  138   < >  139  138  140   K  3.8   < >  3.3*  4.1  4.1   CL  98   < >  106  104  104   CO2  24   < >  22*  20*  22*   BUN  14   < >  10  13  14   CREATININE  0.9   < >  0.6  0.7  0.6   GLU  111*   < >  88  120*  97   MG   --    < >  1.5*  1.6  1.4*   PHOS   --    < >  2.6*  3.3   --    CALCIUM  9.0   < >  7.9*  8.2*  8.3*   ALBUMIN  2.9*   --    --    --    --    PROT  6.4   --    --    --    --    ALKPHOS  47*   --    --    --    --    ALT  16   --    --    --    --    AST  25   --    --    --    --    BILITOT  1.5*   --    --    --    --     < > = values in this interval not displayed.       INR:  Recent Labs      04/10/18   2005   INR  1.1        Diagnostic Studies: No relevant studies.    EKG (04/12/18):  Vent. Rate : 096 BPM     Atrial Rate : 096 BPM  P-R Int : 158 ms          QRS Dur : 156 ms  QT Int : 414 ms       P-R-T Axes : 018 -17 147 degrees  QTc Int : 523 ms    Sinus rhythm with Premature atrial complexes  Left bundle branch block  Abnormal ECG  When compared with ECG of 12-APR-2018 09:15  Sinus rhythm has replaced Atrial fibrillation    2D ECHO:  Results for orders placed or performed during the hospital encounter of 04/10/18   2D echo with color flow doppler   Result Value Ref Range    EF 60 55 - 65    Diastolic Dysfunction No     Aortic Valve Regurgitation MILD     Aortic Valve Stenosis MILD (A)     Est. PA Systolic Pressure 25     Pericardial Effusion SMALL (A)     Mitral Valve Mobility NORMAL     Tricuspid Valve Regurgitation TRIVIAL      CONCLUSIONS     1 - Normal left ventricular systolic function (EF 60-65%).     2 - Concentric hypertrophy.     3 - Mildly enlarged ascending aorta.     4 - Normal left ventricular diastolic function.     5 - Right atrial enlargement.     6 - Normal right ventricular systolic function .     7 - The estimated PA systolic pressure is greater than 25 mmHg.     8 - Mild aortic stenosis, EMMETT = 1.56 cm2, AVAi = 0.71 cm2/m2, peak velocity = 1.9 m/s, mean gradient = 5 mmHg.     9 - Mild aortic regurgitation.     10 - Trivial tricuspid regurgitation.     11 - Small pericardial effusion.     ASSESSMENT/PLAN:     Anesthesia Evaluation    I have reviewed the Patient Summary Reports.    I have reviewed the Nursing Notes.   I have reviewed the Medications.   Prednisone    Review of Systems  Anesthesia Hx:  No problems with previous Anesthesia  History of prior surgery of interest to airway management or planning: Denies Family Hx of Anesthesia complications.   Denies Personal Hx of Anesthesia complications.   Social:  Non-Smoker, No Alcohol Use    Hematology/Oncology:        Hematology Comments: Hx of dvt/pe  Current/Recent Cancer. (bx proven gastric adenocarcinoma )   EENT/Dental:  EENT/Dental Normal denies chronic allergic rhinitis    Cardiovascular:   Hypertension Denies MI.  Denies CAD.    Denies CABG/stent.  Denies Dysrhythmias.             Pulmonary:  Pulmonary Normal  Denies COPD.  Denies Asthma.  Denies Recent URI.  Denies Sleep Apnea.    Renal/:   Chronic Renal Disease (hx of kidney transplant )    Hepatic/GI:   Denies GERD. Denies Liver Disease. Dysphagia, bx proven gastric adenocarcinoma.  Intolerant to liquid intake   Neurological:  Neurology Normal Denies TIA.  Denies CVA. Denies Seizures.    Endocrine:  Endocrine Normal Denies Diabetes.    Psych:   Denies Psychiatric History.          Physical Exam  General:  Obesity    Airway/Jaw/Neck:  Airway Findings: Mouth Opening: Normal Tongue: Normal  General Airway Assessment: Adult  Mallampati: III  Improves to II with phonation.  TM Distance: Normal, at least 6 cm  Jaw/Neck Findings:  Neck ROM: Normal ROM     Eyes/Ears/Nose:  EYES/EARS/NOSE FINDINGS: Normal   Dental:  Dental Findings: In tact    Chest/Lungs:  Chest/Lungs Clear    Heart/Vascular:  Heart Findings: Normal    Abdomen:  Abdomen Findings: Normal    Musculoskeletal:  Musculoskeletal Findings: Normal   Skin:  Skin Findings: Normal    Mental Status:  Mental Status Findings:  Cooperative, Alert and Oriented         Anesthesia Plan  Type of Anesthesia, risks & benefits discussed:  Anesthesia Type:  general  Patient's Preference:   Intra-op Monitoring Plan: standard ASA monitors  Intra-op Monitoring Plan Comments:   Post Op Pain Control Plan: per primary service following discharge from PACU  Post Op Pain Control Plan Comments:   Induction:   IV and rapid sequence  Beta Blocker:  Patient is not currently on a Beta-Blocker (No further documentation required).       Informed Consent: Patient understands risks and agrees with Anesthesia plan.  Questions answered. Anesthesia consent signed with patient.  ASA  Score: 3     Day of Surgery Review of History & Physical:    H&P update referred to the provider.  H&P completed by Anesthesiologist.   Anesthesia Plan Notes: RSI and GETA        Ready For Surgery From Anesthesia Perspective.

## 2018-04-13 NOTE — PT/OT/SLP EVAL
"Speech Language Pathology Evaluation  Bedside Swallow    Patient Name:  Elbert Connelly   MRN:  [id number]  Admitting Diagnosis: Atrial fibrillation  The primary encounter diagnosis was Esophageal obstruction. Diagnoses of Abdominal pain, Dehydration, Bilateral edema of lower extremity, Gastric carcinoma, Nonrheumatic aortic valve disorder, Abdominal pain, unspecified abdominal location, Esophageal spasm, History of DVT (deep vein thrombosis), Hypertension, unspecified type, S/P kidney transplant, Weakness, Chronic kidney disease (CKD), stage II (mild), Hypokalemia, Hypomagnesemia, S/P living-donor kidney transplantation, Prophylactic immunotherapy, SVT (supraventricular tachycardia), Abnormal heart rhythm, and Atrial fibrillation were also pertinent to this visit.    Recommendations:                 General Recommendations:  GI evaluation and ongoing swallow assessment pending Patient's acceptance of PO trials  Diet recommendations:  NPO, NPO   Aspiration Precautions: Strict aspiration precautions   General Precautions: Standard, aspiration  Communication strategies:  none    History:     Past Medical History:   Diagnosis Date    Cancer     stomach    Hypertension     Living-donor kidney transplant (sister) 1982 4/11/2018    Renal disorder 1984    bilat kidney transplant        Past Surgical History:   Procedure Laterality Date    APPENDECTOMY      w cancer tumor removal    CHOLECYSTECTOMY      COLON SURGERY      removed during appendectomy to remove tumors    KIDNEY TRANSPLANT Bilateral 1984       Social History: Patient lives with Spouse in [place], [place].    Prior Intubation HX: 4/12      Modified Barium Swallow: none noted in chart     Chest X-Rays: 4/10/18: No acute abnormality identified.    Prior diet: Pt explains he hasn't been able to keep food down "for months"  Pt currently strict NPO    Occupation/hobbies/homemaking: retired     Subjective     SLP reviews Patient with MD and nurse, both clear " "for PO trials  Patient presents calm, fatigued  He explains, "Not today, maybe tomorrow, I can barely get my medications down and I am due for them soon and I don't want to make myself sicker"  Patient goals: "I'm most concerned about getting this test (endoscopic ultrasound) done, then we can go from there"    Pain/Comfort:  · Pain Rating 1: 2/10  · Location - Orientation 1: generalized  · Location 1: abdomen  · Pain Addressed 1: Cessation of Activity  · Pain Rating Post-Intervention 1: 2/10    Objective:     Oral Musculature Evaluation  · Oral Musculature: WFL  · Dentition: present and adequate  · Mucosal Quality: adequate  · Mandibular Strength and Mobility: WFL  · Oral Labial Strength and Mobility: WFL  · Lingual Strength and Mobility: WFL  · Buccal Strength and Mobility: WFL  · Volitional Cough: elciited, productive   · Volitional Swallow: elicited  · Voice Prior to PO Intake: clear, adequate intensity     Bedside Swallow Eval:   Consistencies Assessed:  · Patient declining PO trials presented by ST     Oral Phase:   · JUAN C due to pt refusal of PO trial. Pt with adequate control of secretions.     Pharyngeal Phase:   · JUAN C due to refusal of PO trials.     Compensatory Strategies  · None    Treatment: Following review if the oral mechanism, Patient declining PO trials presented by ST , explaining he was nauseated earlier service day.  Patient explains he is managing to take medications crushed in puree using "small, little" bites. He adds, "but the water won't stay down."  SLP educates Pt on SLP role, swallow anatomy and phases of swallow, and SLP POC. Patient verbalizes understanding of current NPO status and explains he wishes to try again next service day. No further questions. Whiteboard updated. Nurse notified.      Assessment:     Elbert Connelly is a 67 y.o. male with an SLP diagnosis of esophageal concerns for dysphagia.  He presents with refusal of PO trials as SLP attempts to assess oral and " pharyngeal phases of swallow at the bedside today. Patient with concerns for esophageal dysphagia due to recent findings of esophageal spasm/esophageal obstruction and would benefit from ongoing f/u with GI.  Per Dr. Ware's request, ST to continue to follow up to re-attempt swallow assessment. Thank you.     Goals:    SLP Goals        Problem: SLP Goal    Goal Priority Disciplines Outcome   SLP Goal     SLP Ongoing (interventions implemented as appropriate)   Description:  Speech Language Pathology Goals  Goals expected to be met by 4/20/18  1. Pt will participate in swallow assessment                       Plan:     · Patient to be seen:  4 x/week   · Plan of Care expires:  05/13/18  · Plan of Care reviewed with:  patient   · SLP Follow-Up:  Yes       Discharge recommendations:  other (see comments) (pending progress)   Barriers to Discharge:  None    Time Tracking:     SLP Treatment Date:   04/13/18  Speech Start Time:  1045  Speech Stop Time:  1059     Speech Total Time (min):  14 min    Billable Minutes: Eval Swallow and Oral Function 14    MARK Aiken., CCC-SLP  Speech-Language Pathology  Pager: [phone]    04/13/2018

## 2018-04-13 NOTE — PLAN OF CARE
Problem: Patient Care Overview  Goal: Plan of Care Review  Outcome: Ongoing (interventions implemented as appropriate)  Pt free of falls, injury this shift. POC reviewed with pt at bedside, verbalized understanding. NSR on tele, rates in the 80s. Labs redrawn this shift., K normalized at 4.1; Mg still low at 1.6, replaced with 2g IV. Pt NPO for dysphagia, pt and wife verbalized understanding. Speech Therpay to assess pt today for possible swallow eval. VSS, NAD noted. Will continue to monitor.

## 2018-04-13 NOTE — MEDICAL/APP STUDENT
Hospital Medicine  Progress note    Team: [facility] HOSP MED B Dr Ware  Admit Date: 4/10/2018  LIO 4/14/2018  Code status: Full Code    Principal Problem:  Atrial fibrillation    Interval hx:    4/11: H&P note  4/12 - Chaz: U/S endoscopic upper cancelled for cardiovascular complication, SR converted to ST after intubation with hypotension. Given phenylephrine 100mcg.   4/13: Talk to GI about EGD. General surgery states most likely will require neoadjuvant therapy prior to surgical intervention.      ROS     Pain Scale: 0 /10  Respiratory: no cough or shortness of breath  Cardiovascular: no chest pain or palpitations  Gastrointestinal: no nausea or vomiting, no abdominal pain or change in bowel habits  Behavioral/Psych: no depression or anxiety      PEx  Temp:  [97.8 °F (36.6 °C)-98.6 °F (37 °C)]   Pulse:  []   Resp:  [14-25]   BP: ()/(61-90)   SpO2:  [91 %-100 %]     Intake/Output Summary (Last 24 hours) at 04/13/18 0757  Last data filed at 04/13/18 0500   Gross per 24 hour   Intake              800 ml   Output              575 ml   Net              225 ml       General Appearance: no acute distress   Heart: regular rate and rhythm  Respiratory: Normal respiratory effort, no crackles   Abdomen: Soft, non-tender; bowel sounds active  Skin: intact. IV sites ok  Neurologic:  No focal numbness or weakness  Mental status: Alert, oriented x 4, affect appropriate       Recent Labs  Lab 04/11/18  0516 04/12/18  0427 04/12/18  1112   WBC 4.08 3.19* 3.67*   HGB 9.5* 10.0* 9.6*   HCT 28.3* 29.9* 28.2*    180 151       Recent Labs  Lab 04/10/18  2005 04/11/18  0516  04/12/18  1112 04/12/18 2029 04/13/18  0545    138  < > 139 138 140   K 3.8 2.9*  < > 3.3* 4.1 4.1   CL 98 101  < > 106 104 104   CO2 24 25  < > 22* 20* 22*   BUN 14 14  < > 10 13 14   CREATININE 0.9 0.7  < > 0.6 0.7 0.6   * 97  < > 88 120* 97   CALCIUM 9.0 8.3*  < > 7.9* 8.2* 8.3*   MG  --  1.3*  --  1.5* 1.6  --    PHOS  --  3.0  --   2.6* 3.3  --    LIPASE 43  --   --   --   --   --    AMYLASE 37  --   --   --   --   --    < > = values in this interval not displayed.    Recent Labs  Lab 04/10/18  2005   ALKPHOS 47*   ALT 16   AST 25   ALBUMIN 2.9*   PROT 6.4   BILITOT 1.5*   INR 1.1        Recent Labs  Lab 04/10/18  2357 04/12/18  2154 04/13/18  0741   POCTGLUCOSE 112* 113* 112*     Recent Labs      04/12/18   1112   CPK  10*   CPKMB  0.6   MB  6.0*   TROPONINI  <0.006       Scheduled Meds:   acetaminophen  1,000 mg Intravenous Once    azaTHIOprine  150 mg Oral Daily    diltiaZEM  10 mg Intravenous Once    methylPREDNISolone sodium succinate  10 mg Intravenous Daily    pantoprazole 40 mg in dextrose 5 % 100 mL infusion (ready to mix system)  40 mg Intravenous BID     Continuous Infusions:  As Needed:  acetaminophen, albuterol-ipratropium 2.5mg-0.5mg/3mL, bisacodyl, dextrose 50%, dextrose 50%, glucagon (human recombinant), glucose, glucose, morphine, omnipaque, ondansetron, promethazine (PHENERGAN) IVPB, senna-docusate 8.6-50 mg, sodium chloride 0.9%    Active Hospital Problems    Diagnosis  POA    *Atrial fibrillation [I48.91]  Unknown    Gastric carcinoma [C16.9]  Unknown    Weakness [R53.1]  Unknown    Esophageal spasm [K22.4]  Unknown    History of DVT (deep vein thrombosis) [Z86.718]  Not Applicable    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 May change pred to SM and hold  Aza for short period if unable to swallow.  -Follow with strict I/Os, daily weights, BP (goal <140/90), daily labs.    -Check immuknow cylex to better assess net IS.      Hypokalemia [E87.6]  Yes    Hypomagnesemia [E83.42]  Yes    Nonrheumatic aortic valve disorder [I35.9]  Unknown    Esophageal obstruction  [K22.2]  Yes    Bilateral edema of lower extremity [R60.0]  Unknown    Dehydration [E86.0]  Unknown      Resolved Hospital Problems    Diagnosis Date Resolved POA   No resolved problems to display.       Overview: Mr. Elbert Connelly is a 67 y.o. male with medical problems including HTN, DVT/PE (IVC filter), renal disorder, and history of kidney transplant who presents with complaint of abdominal pain, fatigue, weakness, dizziness and esophageal obstruction.          Assessment and Plan for Problems addressed today:    * Esophageal obstruction     Esophageal spasm  Gastric carcnoma  - AES consult for EGD/EUS for gastric cancer  - Surgical oncology consult and appreciate recs  - Will need documentation from OSH  - Unable to tolerate saliva but protecting airway.  - Strict NPO, aspiration precautions  - Not currently on anticoagulation- will need to be restarted on this admission  - LR mIVF x12 hours  - 4/11 - [facility]: NPO for procedure/surgery and aspiration concern, IV hydration and trend labs for electrolytes . CT thorax/abdomen/pelvis w/IV contrast for staging  EUS tomorrow pending CT results  SLP consult for recs re: meds/diet   Surg Onc consultation - paged 12 noon  4/12: U/S endoscopy upper cancelled for cardiovascular reasons  4/13: follow up with GI about EGD       S/P kidney transplant (Stable)     One functioning transplanted kidney  - KTM consult  - Avoid nephrotoxic agents, including contrast  - will defer abdominal imaging decision for cancer to KTM/surg-onc team  - Cr. 0.9, lytes WNL  - Mag/Phos   - Will change prednisone 10 mg PO daily to soludmedrol 10 mg IV daily while unable to tolerate PO  - Wife able to give patient imuran 150 mg PO daily crushed up in pudding at home.  No IV available, per pharmacy. Continue PO Imuran as long as patient can tolerate.   4/11 - [facility]: Stable functioning kidney Cr .7 BUN 14, continue PO Imuran as long as tolerated.           History of DVT (deep vein thrombosis)      H/o DVT and PE  - Currently not on AC.  Was on warfarin for years, but switched to eliquis.  Eliquis recently stopped d/t inability to tolerate PO.  Will need to be restarted on eliquis after EGD/surgical intreventions. Patient does not know dosing.   - IVC filter currently in place  - Currently in hypercoagulable state  4/11 - [facility]: Continue treatment with eliquis after intervention with swallowing difficulty.        Dehydration     2/2 esophageal obstruction  - IVF replacement  4/11 - [facility]: Continue IV hydration       Bilateral edema of lower extremity     Chronic  BNP 65 in setting of obesity  - will order 2D ECHO  - no erythema, swelling appears equal          Weakness     PT/OT consult  4/11 - [facility]: PT/OT consult when ready from intervention of esophageal spasm and gastric cancer       Hypertension     Stable  - Continue losartan 50 mg PO daily, cardizem 60 mg PO TID when able to tolerate PO         Hypokalemia(Resolved)        DVT PPx: enoxaparin        Discharge plan and follow up        Provider    I personally scribed for Dwight Ware MD on 04/13/2018 at 9:26 AM. Electronically signed by konstantin Thompson III on 04/13/2018 at 9:26 AM

## 2018-04-13 NOTE — PT/OT/SLP EVAL
"Physical Therapy Evaluation    Patient Name:  Elbert Connelly   MRN:  [id number]    Recommendations:     Discharge Recommendations:  nursing facility, skilled ((may progress to HHPT))   Discharge Equipment Recommendations:  (TBD)   Barriers to discharge: None    Assessment:     Elbert Connelly is a 67 y.o. male admitted with a medical diagnosis of Atrial fibrillation.  He presents with the following impairments/functional limitations:  weakness, impaired endurance, pain, impaired balance, gait instability, decreased lower extremity function, impaired cardiopulmonary response to activity. Pt cooperative and agreeable to evaluation after initial encouragement. Pt tolerated evaluated well except for c/o of BLE pain with movement. Pt needed VC to push from bed for sit>stand, but reported that he would rather pull from the RW despite explanation why other method was more effective. Pt able to perform sit> this way with Myranda x 2 for transfer and walker management. Recommending SNF at this time, but may progress to HHPT.    Rehab Prognosis:  good; patient would benefit from acute skilled PT services to address these deficits and reach maximum level of function.      Recent Surgery: Procedure(s) (LRB):  ULTRASOUND-ENDOSCOPIC-UPPER (N/A) 1 Day Post-Op    Plan:     During this hospitalization, patient to be seen 4 x/week to address the above listed problems via gait training, therapeutic activities, therapeutic exercises  · Plan of Care Expires:  05/13/18   Plan of Care Reviewed with: patient, spouse    Subjective     Communicated with nursing prior to session.  Patient found supine in bed, with wife in room, upon PT entry to room, agreeable to evaluation.      Chief Complaint: BLE pain with movement; fatigue  Patient comments/goals: "I haven't slept all night and I haven't eaten in forever" "i'll try but I'm not walking today"  Pain/Comfort:  · Pain Rating 1: 0/10  · Pain Rating Post-Intervention 1:  (not rated; " "reported BLE pain with movement)    Patients cultural, spiritual, Anabaptist conflicts given the current situation: none    Living Environment:  Pt lives with wife in [facility] with ramp access.   Prior to admission, patients level of function was modified independent for mobility with use of rollator.  Patient has the following equipment: rollator, shower chair.  DME owned (not currently used): single point cane.  Upon discharge, patient will have assistance from wife as needed.    Objective:     Patient found with: telemetry     General Precautions: Standard, fall   Orthopedic Precautions:N/A   Braces: N/A     Exams: not formally tested, assessed through functional mobility  · RLE ROM: WFL  · RLE Strength: WFL  · LLE ROM: WFL  · LLE Strength: WFL    Functional Mobility:  · Bed Mobility:     · Rolling Right: minimum assistance with use of the bedrail and HOB elevated  · Scooting to EOB in sitting: stand by assistance with HOB elevated  · Supine to Sit: moderate assistance for BLE and trunk with use of bedrail with HOB elevated  · Transfers:     · Sit to Stand:  minimum assistance, of 2 persons and needing assistance for pt and in holding RW down with rolling walker  · Stand to Sit: min A of 1 person with RW  · Gait: 4 steps to HOB using RW and CGA with no LOB  · Balance: fair-good static/dynamic standing balance during stand and step to HOB    AM-PAC 6 CLICK MOBILITY  Total Score:11       Therapeutic Activities and Exercises:  PT educated pt and wife on:  -PT POC (continuing seeing pt for AC PT and attempt further gait in next session)  -safety during sit<>stand with RW ("push from the bed and don't pull from the walker"    Patient left supine with HOB elevated with all lines intact, call button in reach and wife present.    GOALS:    Physical Therapy Goals        Problem: Physical Therapy Goal    Goal Priority Disciplines Outcome Goal Variances Interventions   Physical Therapy Goal     PT/OT, PT Ongoing (interventions " implemented as appropriate)     Description:  Goals to be met by: 2018     Patient will increase functional independence with mobility by performin. Supine to sit with Contact Guard Assistance.  2. Rolling to Left and Right with Stand-by Assistance.  3. Sit to stand transfer with Contact Guard Assistance with appropriate AD.  4. Bed to chair transfer with Contact Guard Assistance with appropriate AD.  5. Gait  x 100 feet with Stand-by Assistance with appropriate AD.  6. Lower extremity exercise program x 20 reps per handout, with supervision.                      History:     Past Medical History:   Diagnosis Date    Cancer     stomach    Hypertension     Living-donor kidney transplant (sister) 2018    Renal disorder 1984    bilat kidney transplant        Past Surgical History:   Procedure Laterality Date    APPENDECTOMY      w cancer tumor removal    CHOLECYSTECTOMY      COLON SURGERY      removed during appendectomy to remove tumors    KIDNEY TRANSPLANT Bilateral        Clinical Decision Making:     History  Co-morbidities and personal factors that may impact the plan of care Examination  Body Structures and Functions, activity limitations and participation restrictions that may impact the plan of care Clinical Presentation   Decision Making/ Complexity Score   Co-morbidities:   [x] Time since onset of injury / illness / exacerbation  [x] Status of current condition  []Patient's cognitive status and safety concerns    [] Multiple Medical Problems (see med hx)  Personal Factors:   [] Patient's age  [] Prior Level of function   [] Patient's home situation (environment and family support)  [] Patient's level of motivation  [] Expected progression of patient      HISTORY:(criteria)    [] 29014 - no personal factors/history    [x] 39972 - has 1-2 personal factor/comorbidity     [] 94057 - has >3 personal factor/comorbidity     Body Regions:  [] Objective examination findings  [] Head      []  Neck  [] Trunk   [] Upper Extremity  [] Lower Extremity    Body Systems:  [] For communication ability, affect, cognition, language, and learning style: the assessment of the ability to make needs known, consciousness, orientation (person, place, and time), expected emotional /behavioral responses, and learning preferences (eg, learning barriers, education  needs)  [x] For the neuromuscular system: a general assessment of gross coordinated movement (eg, balance, gait, locomotion, transfers, and transitions) and motor function  (motor control and motor learning)  [] For the musculoskeletal system: the assessment of gross symmetry, gross range of motion, gross strength, height, and weight  [] For the integumentary system: the assessment of pliability(texture), presence of scar formation, skin color, and skin integrity  [x] For cardiovascular/pulmonary system: the assessment of heart rate, respiratory rate, blood pressure, and edema     Activity limitations:    [] Patient's cognitive status and saf ety concerns          [] Status of current condition      [] Weight bearing restriction  [] Cardiopulmunary Restriction    Participation Restrictions:   [] Goals and goal agreement with the patient     [] Rehab potential (prognosis) and probable outcome      Examination of Body System: (criteria)    [x] 69094 - addressing 1-2 elements    [] 70086 - addressing a total of 3 or more elements     [] 89269 -  Addressing a total of 4 or more elements         Clinical Presentation: (criteria)  Unstable - 40167     On examination of body system using standardized tests and measures patient presents with (CHOOSE ONE) elements from any of the following: body structures and functions, activity limitations, and/or participation restrictions.  Leading to a clinical presentation that is considered (CHOOSE ONE)                              Clinical Decision Making  (Eval Complexity):  Low- 47127     Time Tracking:     PT Received On:  04/13/18  PT Start Time: 0908     PT Stop Time: 0923  PT Total Time (min): 15 min     Billable Minutes: Evaluation 15 mins      Mechelle Boyer, SPT  04/13/2018

## 2018-04-13 NOTE — PROGRESS NOTES
Hospital Medicine  Progress note    I personally performed the history, physical exam and medical decision making: and confirmed the accuracy of the information in the transcribed note.    [place] [facility]  Admit Date: 4/10/2018  LIO 4/14/2018  Code status: Full Code    Principal Problem:  Atrial fibrillation    Interval hx:    4/11: H&P note  4/12 - Chaz: U/S endoscopic upper cancelled for cardiovascular complication, SR converted to ST after intubation with hypotension. Given phenylephrine 100mcg.   4/13: Talk to GI about EGD. General surgery states most likely will require neoadjuvant therapy prior to surgical intervention.      ROS     Pain Scale: 0 /10  Respiratory: no cough or shortness of breath  Cardiovascular: no chest pain or palpitations  Gastrointestinal: no nausea or vomiting, no abdominal pain or change in bowel habits  Behavioral/Psych: no depression or anxiety      PEx  Temp:  [97.8 °F (36.6 °C)-98.6 °F (37 °C)]   Pulse:  []   Resp:  [14-19]   BP: (106-160)/(67-90)   SpO2:  [91 %-100 %]     Intake/Output Summary (Last 24 hours) at 04/13/18 1130  Last data filed at 04/13/18 0500   Gross per 24 hour   Intake                0 ml   Output              575 ml   Net             -575 ml       General Appearance: no acute distress   Heart: regular rate and rhythm  Respiratory: Normal respiratory effort, no crackles   Abdomen: Soft, non-tender; bowel sounds active  Skin: intact. IV sites ok  Neurologic:  No focal numbness or weakness  Mental status: Alert, oriented x 4, affect appropriate       Recent Labs  Lab 04/12/18 0427 04/12/18  1112 04/13/18  0545   WBC 3.19* 3.67* 4.56   HGB 10.0* 9.6* 10.1*   HCT 29.9* 28.2* 29.7*    151 214       Recent Labs  Lab 04/10/18  2005  04/11/18  0516  04/12/18  1112 04/12/18 2029 04/13/18  0545     --  138  < > 139 138 140   K 3.8  --  2.9*  < > 3.3* 4.1 4.1   CL 98  --  101  < > 106 104 104   CO2 24  --  25  < > 22* 20* 22*   BUN 14   --  14  < > 10 13 14   CREATININE 0.9  --  0.7  < > 0.6 0.7 0.6   *  --  97  < > 88 120* 97   CALCIUM 9.0  --  8.3*  < > 7.9* 8.2* 8.3*   MG  --   < > 1.3*  --  1.5* 1.6 1.4*   PHOS  --   --  3.0  --  2.6* 3.3  --    LIPASE 43  --   --   --   --   --   --    AMYLASE 37  --   --   --   --   --   --    < > = values in this interval not displayed.    Recent Labs  Lab 04/10/18  2005   ALKPHOS 47*   ALT 16   AST 25   ALBUMIN 2.9*   PROT 6.4   BILITOT 1.5*   INR 1.1        Recent Labs  Lab 04/10/18  2357 04/12/18  2154 04/13/18  0741   POCTGLUCOSE 112* 113* 112*     Recent Labs      04/12/18   1112   CPK  10*   CPKMB  0.6   MB  6.0*   TROPONINI  <0.006       Scheduled Meds:   azaTHIOprine  150 mg Oral Daily    diltiaZEM  10 mg Intravenous Once    methylPREDNISolone sodium succinate  10 mg Intravenous Daily    pantoprazole 40 mg in dextrose 5 % 100 mL infusion (ready to mix system)  40 mg Intravenous BID     Continuous Infusions:  As Needed:  acetaminophen, albuterol-ipratropium 2.5mg-0.5mg/3mL, bisacodyl, dextrose 50%, dextrose 50%, glucagon (human recombinant), glucose, glucose, morphine, omnipaque, ondansetron, promethazine (PHENERGAN) IVPB, senna-docusate 8.6-50 mg, sodium chloride 0.9%    Active Hospital Problems    Diagnosis  POA    *Atrial fibrillation [I48.91]  Unknown    Gastric carcinoma [C16.9]  Unknown    Weakness [R53.1]  Unknown    Esophageal spasm [K22.4]  Unknown    History of DVT (deep vein thrombosis) [Z86.718]  Not Applicable    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 May change pred to SM and hold  Aza for short period if unable to swallow.  -Follow with strict I/Os, daily weights, BP (goal <140/90), daily labs.    -Check immuknow cylex to  better assess net IS.      Hypokalemia [E87.6]  Yes    Hypomagnesemia [E83.42]  Yes    Nonrheumatic aortic valve disorder [I35.9]  Unknown    Esophageal obstruction [K22.2]  Yes    Bilateral edema of lower extremity [R60.0]  Unknown    Dehydration [E86.0]  Unknown      Resolved Hospital Problems    Diagnosis Date Resolved POA   No resolved problems to display.       Overview: Mr. Elbert Connelly is a 67 y.o. male with medical problems including HTN, DVT/PE (IVC filter), renal disorder, and history of kidney transplant who presents with complaint of abdominal pain, fatigue, weakness, dizziness and esophageal obstruction.          Assessment and Plan for Problems addressed today:    * Esophageal obstruction     Esophageal spasm  Gastric carcnoma  - AES consult for EGD/EUS for gastric cancer  - Surgical oncology consult and appreciate recs  - Will need documentation from OSH  - Unable to tolerate saliva but protecting airway.  - Strict NPO, aspiration precautions  - Not currently on anticoagulation- will need to be restarted on this admission  - LR mIVF x12 hours  - 4/11 - [facility]: NPO for procedure/surgery and aspiration concern, IV hydration and trend labs for electrolytes . CT thorax/abdomen/pelvis w/IV contrast for staging  EUS tomorrow pending CT results  SLP consult for recs re: meds/diet   Surg Onc consultation - paged 12 noon  4/12: U/S endoscopy upper cancelled for cardiovascular reasons  4/13: follow up with GI about EGD       S/P kidney transplant (Stable)     One functioning transplanted kidney  - KTM consult  - Avoid nephrotoxic agents, including contrast  - will defer abdominal imaging decision for cancer to KTM/surg-onc team  - Cr. 0.9, fanny WNL  - Mag/Phos   - Will change prednisone 10 mg PO daily to soludmedrol 10 mg IV daily while unable to tolerate PO  - Wife able to give patient imuran 150 mg PO daily crushed up in pudding at home.  No IV available, per pharmacy. Continue PO Imuran as long as  patient can tolerate.   4/11 - [facility]: Stable functioning kidney Cr .7 BUN 14, continue PO Imuran as long as tolerated.           History of DVT (deep vein thrombosis)     H/o DVT and PE  - Currently not on AC.  Was on warfarin for years, but switched to eliquis.  Eliquis recently stopped d/t inability to tolerate PO.  Will need to be restarted on eliquis after EGD/surgical intreventions. Patient does not know dosing.   - IVC filter currently in place  - Currently in hypercoagulable state  4/11 - [facility]: Continue treatment with eliquis after intervention with swallowing difficulty.        Dehydration     2/2 esophageal obstruction  - IVF replacement  4/11 - [facility]: Continue IV hydration       Bilateral edema of lower extremity     Chronic  BNP 65 in setting of obesity  - will order 2D ECHO  - no erythema, swelling appears equal          Weakness     PT/OT consult  4/11 - [facility]: PT/OT consult when ready from intervention of esophageal spasm and gastric cancer       Hypertension     Stable  - Continue losartan 50 mg PO daily, cardizem 60 mg PO TID when able to tolerate PO         Hypokalemia(Resolved)        DVT PPx: enoxaparin        Discharge plan and follow up        Provider    I personally scribed for Dwight Ware MD on 04/13/2018 at 9:26 AM. Electronically signed by konstantin Thompson III on 04/13/2018 at 9:26 AM

## 2018-04-13 NOTE — SUBJECTIVE & OBJECTIVE
"  Subjective:   History of Present Illness:  Elbert is a 68 yo WM s/p living donor kidney transplant from sister in 1982 (Dr. Sow at [facility], placed in [facility]) admitted for N/V and newly diagnosed gastric cancer for multidisciplinary GI and surgical oncology eval.  He reports no issues of rejection or other txp complications. He has been maintained on prednisone 10 mg daily and azathioprine 150 mg daily. (He reports taking cyclosporine for only a short period after txp).  He states he has been able to keep his IS down by crushing meds in applesauce, but cannot eat much; wife states liquids stay down for a few minutes before he vomits it back up. From kidney standpoint, he reports no past  issues except remote UTI "years ago," but he started with urinary incontinence in very recent past. He denies pain over allograft, frequency or urgency.    He states his current illness began in January when he started with vomiting. He states he has lost 60#s since then, which he attributes to difficulty tolerating PO intake. He notes he has been in an out of the hospital multiple times over the past 3 months for similar issue.  Last week he states and EGD w/ biopsies showed stomach cancer.        Hospital Course:  No notes on file    Interval History:  -pt complains of having "terrible" night - did not sleep  -pt's Sx of dysphagia have not changed    Past Medical, Surgical, Family, and Social History:   Unchanged from H&P.    Scheduled Meds:   azaTHIOprine  150 mg Oral Daily    diltiaZEM  10 mg Intravenous Once    methylPREDNISolone sodium succinate  10 mg Intravenous Daily    pantoprazole 40 mg in dextrose 5 % 100 mL infusion (ready to mix system)  40 mg Intravenous BID     Continuous Infusions:  PRN Meds:acetaminophen, albuterol-ipratropium 2.5mg-0.5mg/3mL, bisacodyl, dextrose 50%, dextrose 50%, glucagon (human recombinant), glucose, glucose, morphine, omnipaque, ondansetron, promethazine (PHENERGAN) IVPB, senna-docusate " "8.6-50 mg, sodium chloride 0.9%    Intake/Output - Last 3 Shifts       04/11 0700 - 04/12 0659 04/12 0700 - 04/13 0659 04/13 0700 - 04/14 0659    P.O. 0 0     I.V. (mL/kg) 950 (10.7) 800 (9)     IV Piggyback 100      Total Intake(mL/kg) 1050 (11.8) 800 (9)     Urine (mL/kg/hr)  825 (0.4)     Total Output   825      Net +1050 -25             Urine Occurrence 2 x 1 x            Review of Systems   Neg except as above   Objective:     Vital Signs (Most Recent):  Temp: 98 °F (36.7 °C) (04/13/18 0742)  Pulse: 71 (04/13/18 0742)  Resp: 16 (04/13/18 0742)  BP: (!) 155/84 (04/13/18 0742)  SpO2: (!) 91 % (04/13/18 0742) Vital Signs (24h Range):  Temp:  [97.8 °F (36.6 °C)-98.6 °F (37 °C)] 98 °F (36.7 °C)  Pulse:  [] 71  Resp:  [14-25] 16  SpO2:  [91 %-100 %] 91 %  BP: ()/(61-90) 155/84     Weight: 88.8 kg (195 lb 12.3 oz)  Height: 5' 7" (170.2 cm)  Body mass index is 30.66 kg/m².    Physical Exam  GEN - AAOx4, NAD  CHEST  -CTA B  HEART - rrr, no m/r/s3/s4  ABD - obese; nottp  EXTR  - trace edema to shins  Laboratory:  Component      Latest Ref Rng & Units 4/13/2018   Sodium      136 - 145 mmol/L 140   Potassium      3.5 - 5.1 mmol/L 4.1   Chloride      95 - 110 mmol/L 104   CO2      23 - 29 mmol/L 22 (L)   Glucose      70 - 110 mg/dL 97   BUN, Bld      8 - 23 mg/dL 14   Creatinine      0.5 - 1.4 mg/dL 0.6   Calcium      8.7 - 10.5 mg/dL 8.3 (L)   Anion Gap      8 - 16 mmol/L 14   eGFR if African American      >60 mL/min/1.73 m:2 >60.0   eGFR if non African American      >60 mL/min/1.73 m:2 >60.0     "

## 2018-04-13 NOTE — ASSESSMENT & PLAN NOTE
66yo male with biopsy positive adenocarcinoma of the stomach and thickening of the distal esophagus    - NPO, EUS unsuccessful yesterday because of unstable a fib with RVR, cardiology now following  - CT with concerns for lymph node metastases along lesser curve and diffuse thickening of distal esophagus, will follow up results for surgical planning. Most likely will require neoadjuvant therapy with prehab prior to surgical intervention.  - prealbumin 8, would recommend PICC line placement and starting TPN for nutritional support  - will continue to follow along

## 2018-04-13 NOTE — PROGRESS NOTES
"[facility]  Kidney Transplant  Progress Note      Reason for Follow-up: Reassessment of  recipient and management of immunosuppression.          Subjective:   History of Present Illness:  Elbert is a 68 yo WM s/p living donor kidney transplant from sister in 1982 (Dr. [place]ow [place] [facility] [place]) admitted for N/V and newly diagnosed gastric cancer for multidisciplinary GI and surgical oncology eval.  He reports no issues of rejection or other txp complications. He has been maintained on prednisone 10 mg daily and azathioprine 150 mg daily. (He reports taking cyclosporine for only a short period after txp).  He states he has been able to keep his IS down by crushing meds in applesauce, but cannot eat much; wife states liquids stay down for a few minutes before he vomits it back up. From kidney standpoint, he reports no past  issues except remote UTI "years ago," but he started with urinary incontinence in very recent past. He denies pain over allograft, frequency or urgency.    He states his current illness began in January when he started with vomiting. He states he has lost 60#s since then, which he attributes to difficulty tolerating PO intake. He notes he has been in an out of the hospital multiple times over the past 3 months for similar issue.  Last week he states and EGD w/ biopsies showed stomach cancer.        Hospital Course:  No notes on file    Interval History:  -pt complains of having "terrible" night - did not sleep  -pt's Sx of dysphagia have not changed    Past Medical, Surgical, Family, and Social History:   Unchanged from H&P.    Scheduled Meds:   azaTHIOprine  150 mg Oral Daily    diltiaZEM  10 mg Intravenous Once    methylPREDNISolone sodium succinate  10 mg Intravenous Daily    pantoprazole 40 mg in dextrose 5 % 100 mL infusion (ready to mix system)  40 mg Intravenous BID     Continuous Infusions:  PRN Meds:acetaminophen, albuterol-ipratropium 2.5mg-0.5mg/3mL, " "bisacodyl, dextrose 50%, dextrose 50%, glucagon (human recombinant), glucose, glucose, morphine, omnipaque, ondansetron, promethazine (PHENERGAN) IVPB, senna-docusate 8.6-50 mg, sodium chloride 0.9%    Intake/Output - Last 3 Shifts       04/11 0700 - 04/12 0659 04/12 0700 - 04/13 0659 04/13 0700 - 04/14 0659    P.O. 0 0     I.V. (mL/kg) 950 (10.7) 800 (9)     IV Piggyback 100      Total Intake(mL/kg) 1050 (11.8) 800 (9)     Urine (mL/kg/hr)  825 (0.4)     Total Output   825      Net +1050 -25             Urine Occurrence 2 x 1 x            Review of Systems   Neg except as above   Objective:     Vital Signs (Most Recent):  Temp: 98 °F (36.7 °C) (04/13/18 0742)  Pulse: 71 (04/13/18 0742)  Resp: 16 (04/13/18 0742)  BP: (!) 155/84 (04/13/18 0742)  SpO2: (!) 91 % (04/13/18 0742) Vital Signs (24h Range):  Temp:  [97.8 °F (36.6 °C)-98.6 °F (37 °C)] 98 °F (36.7 °C)  Pulse:  [] 71  Resp:  [14-25] 16  SpO2:  [91 %-100 %] 91 %  BP: ()/(61-90) 155/84     Weight: 88.8 kg (195 lb 12.3 oz)  Height: 5' 7" (170.2 cm)  Body mass index is 30.66 kg/m².    Physical Exam  GEN - AAOx4, NAD  CHEST  -CTA B  HEART - rrr, no m/r/s3/s4  ABD - obese; nottp  EXTR  - trace edema to shins  Laboratory:  Component      Latest Ref Rng & Units 4/13/2018   Sodium      136 - 145 mmol/L 140   Potassium      3.5 - 5.1 mmol/L 4.1   Chloride      95 - 110 mmol/L 104   CO2      23 - 29 mmol/L 22 (L)   Glucose      70 - 110 mg/dL 97   BUN, Bld      8 - 23 mg/dL 14   Creatinine      0.5 - 1.4 mg/dL 0.6   Calcium      8.7 - 10.5 mg/dL 8.3 (L)   Anion Gap      8 - 16 mmol/L 14   eGFR if African American      >60 mL/min/1.73 m:2 >60.0   eGFR if non African American      >60 mL/min/1.73 m:2 >60.0     Assessment/Plan:     * Atrial fibrillation    -back in sinus  -rec aggressive K/Mg replacement          Hypomagnesemia    --Replace IV and recheck          Hypokalemia    -IV repletion since having great difficulty with po. Follow lab closely. Follow Mg " level (also low and requires repletion)          Prophylactic immunotherapy      -cont prednisone/azathioprine  -cylex pending        Chronic kidney disease (CKD), stage II (mild)    -CKD2 s/p kidney transplantation. Continue to monitor renal function and electrolytes, HTN, secondary hyperparathyroidism and other issues related to underlying ESRD.   -Avoid further renal insults, when possible: nephrotoxins, volume shifts, aim for BP within target.           Living-donor kidney transplant (sister) 1982    -Doing very well since transplant  -Cont IS and watch  -See CKD          Gastric carcinoma    -gen surgery suggesting neoadjuvant therapy with prehab prior to surgical intervention.          Bilateral edema of lower extremity    -Hypoalbuminemia and immobility contributing factors. Diurese if OK with primary team with close lab f/u.                  Elbert Joe II, MD  Kidney Transplant  [facility]

## 2018-04-13 NOTE — SUBJECTIVE & OBJECTIVE
Interval History: No acute events overnight. Attempted EUS yesterday but patient went into unstable a fib with RVR requiring CPR for unknown amount of time, now on cardiac unit. No cardiac events overnight, HR 70s-80s. Continues to be NPO.    Medications:  Continuous Infusions:  Scheduled Meds:   acetaminophen  1,000 mg Intravenous Once    azaTHIOprine  150 mg Oral Daily    diltiaZEM  10 mg Intravenous Once    methylPREDNISolone sodium succinate  10 mg Intravenous Daily    pantoprazole 40 mg in dextrose 5 % 100 mL infusion (ready to mix system)  40 mg Intravenous BID     PRN Meds:acetaminophen, albuterol-ipratropium 2.5mg-0.5mg/3mL, bisacodyl, dextrose 50%, dextrose 50%, glucagon (human recombinant), glucose, glucose, morphine, omnipaque, ondansetron, promethazine (PHENERGAN) IVPB, senna-docusate 8.6-50 mg, sodium chloride 0.9%     Review of patient's allergies indicates:   Allergen Reactions    Allopurinol analogues      Objective:     Vital Signs (Most Recent):  Temp: 98 °F (36.7 °C) (04/13/18 0742)  Pulse: 71 (04/13/18 0742)  Resp: 16 (04/13/18 0742)  BP: (!) 155/84 (04/13/18 0742)  SpO2: (!) 91 % (04/13/18 0742) Vital Signs (24h Range):  Temp:  [97.8 °F (36.6 °C)-98.6 °F (37 °C)] 98 °F (36.7 °C)  Pulse:  [] 71  Resp:  [14-25] 16  SpO2:  [91 %-100 %] 91 %  BP: ()/(61-90) 155/84     Weight: 88.8 kg (195 lb 12.3 oz)  Body mass index is 30.66 kg/m².    Intake/Output - Last 3 Shifts       04/11 0700 - 04/12 0659 04/12 0700 - 04/13 0659 04/13 0700 - 04/14 0659    P.O. 0 0     I.V. (mL/kg) 950 (10.7) 800 (9)     IV Piggyback 100      Total Intake(mL/kg) 1050 (11.8) 800 (9)     Urine (mL/kg/hr)  825 (0.4)     Total Output   825      Net +1050 -25             Urine Occurrence 2 x 1 x           Physical Exam   Constitutional: He is oriented to person, place, and time. He appears well-developed and well-nourished.   HENT:   Head: Normocephalic and atraumatic.   Cardiovascular: Normal rate.     Pulmonary/Chest: Effort normal.   Abdominal: Soft.   Neurological: He is alert and oriented to person, place, and time.   Skin: Skin is warm and dry.   Vitals reviewed.      Significant Labs:  CBC:     Recent Labs  Lab 04/12/18  1112   WBC 3.67*   RBC 3.01*   HGB 9.6*   HCT 28.2*      MCV 94   MCH 31.9*   MCHC 34.0     CMP:   Recent Labs  Lab 04/10/18  2005  04/13/18  0545   *  < > 97   CALCIUM 9.0  < > 8.3*   ALBUMIN 2.9*  --   --    PROT 6.4  --   --      < > 140   K 3.8  < > 4.1   CO2 24  < > 22*   CL 98  < > 104   BUN 14  < > 14   CREATININE 0.9  < > 0.6   ALKPHOS 47*  --   --    ALT 16  --   --    AST 25  --   --    BILITOT 1.5*  --   --    < > = values in this interval not displayed.    Significant Diagnostics:  I have reviewed all pertinent imaging results/findings within the past 24 hours.

## 2018-04-13 NOTE — PLAN OF CARE
"Problem: SLP Goal  Goal: SLP Goal  Outcome: Ongoing (interventions implemented as appropriate)  Pt declining PO trials across SLP attempts to initiate bedside swallow assessment. Pt explains, "Not today, I've been sick already and I can't, maybe tomorrow." Findings reviewed with nurse. Thank you.    MARK Aiken., [facility]SLP  Speech-Language Pathology  Pager: [phone]  4/13/2018        "

## 2018-04-13 NOTE — PROGRESS NOTES
[facility]  General Surgery  Progress Note    Subjective:     History of Present Illness:  This is a 67 y.o. male with medical problems including HTN, DVT/PE (IVC filter), renal disorder, and history of kidney transplant who presents with complaint of abdominal pain, fatigue, weakness, dizziness and esophageal obstruction. Pt has been having difficulty tolerating PO intake since January. He has been in an out of the hospital multiple times over the past 3 months for similar issue.  Pt was told he has esophageal spasms and was esophagus is in a corkscrew fashion. Last week pt had EGD and biopsies taken at that appointment showed stomach cancer. Pt was discharged on Sunday and told to follow up with oncologist at [facility] and have general surgery see him, despite still unable to tolerate PO intake. Family reports he had not been able to keeps down any food or liquids or swallow his saliva since discharge. They state liquids stay down for a few minutes before he vomits it back up.  Today home health nurse saw the pt and stated they should report to the ED due to the pt's condition. Pt denies hematemesis, hematuria, and blood in stool.       Of note, patient does report urinary incontinence x3 weeks, chronic BLE edema, chronic SOB/MURO, easy fatigability.       In ED, given 1L NS, 1L D51/2 NS.  CXR clear, BNP 65.  Albumin 2.5. Lactate 2.1 HDS, 99% on RA.      Post-Op Info:  Procedure(s) (LRB):  ULTRASOUND-ENDOSCOPIC-UPPER (N/A)   1 Day Post-Op     Interval History: No acute events overnight. Attempted EUS yesterday but patient went into unstable a fib with RVR requiring CPR for unknown amount of time, now on cardiac unit. No cardiac events overnight, HR 70s-80s. Continues to be NPO.    Medications:  Continuous Infusions:  Scheduled Meds:   acetaminophen  1,000 mg Intravenous Once    azaTHIOprine  150 mg Oral Daily    diltiaZEM  10 mg Intravenous Once    methylPREDNISolone sodium succinate  10 mg  Intravenous Daily    pantoprazole 40 mg in dextrose 5 % 100 mL infusion (ready to mix system)  40 mg Intravenous BID     PRN Meds:acetaminophen, albuterol-ipratropium 2.5mg-0.5mg/3mL, bisacodyl, dextrose 50%, dextrose 50%, glucagon (human recombinant), glucose, glucose, morphine, omnipaque, ondansetron, promethazine (PHENERGAN) IVPB, senna-docusate 8.6-50 mg, sodium chloride 0.9%     Review of patient's allergies indicates:   Allergen Reactions    Allopurinol analogues      Objective:     Vital Signs (Most Recent):  Temp: 98 °F (36.7 °C) (04/13/18 0742)  Pulse: 71 (04/13/18 0742)  Resp: 16 (04/13/18 0742)  BP: (!) 155/84 (04/13/18 0742)  SpO2: (!) 91 % (04/13/18 0742) Vital Signs (24h Range):  Temp:  [97.8 °F (36.6 °C)-98.6 °F (37 °C)] 98 °F (36.7 °C)  Pulse:  [] 71  Resp:  [14-25] 16  SpO2:  [91 %-100 %] 91 %  BP: ()/(61-90) 155/84     Weight: 88.8 kg (195 lb 12.3 oz)  Body mass index is 30.66 kg/m².    Intake/Output - Last 3 Shifts       04/11 0700 - 04/12 0659 04/12 0700 - 04/13 0659 04/13 0700 - 04/14 0659    P.O. 0 0     I.V. (mL/kg) 950 (10.7) 800 (9)     IV Piggyback 100      Total Intake(mL/kg) 1050 (11.8) 800 (9)     Urine (mL/kg/hr)  825 (0.4)     Total Output   825      Net +1050 -25             Urine Occurrence 2 x 1 x           Physical Exam   Constitutional: He is oriented to person, place, and time. He appears well-developed and well-nourished.   HENT:   Head: Normocephalic and atraumatic.   Cardiovascular: Normal rate.    Pulmonary/Chest: Effort normal.   Abdominal: Soft.   Neurological: He is alert and oriented to person, place, and time.   Skin: Skin is warm and dry.   Vitals reviewed.      Significant Labs:  CBC:     Recent Labs  Lab 04/12/18  1112   WBC 3.67*   RBC 3.01*   HGB 9.6*   HCT 28.2*      MCV 94   MCH 31.9*   MCHC 34.0     CMP:   Recent Labs  Lab 04/10/18  2005  04/13/18  0545   *  < > 97   CALCIUM 9.0  < > 8.3*   ALBUMIN 2.9*  --   --    PROT 6.4  --   --       < > 140   K 3.8  < > 4.1   CO2 24  < > 22*   CL 98  < > 104   BUN 14  < > 14   CREATININE 0.9  < > 0.6   ALKPHOS 47*  --   --    ALT 16  --   --    AST 25  --   --    BILITOT 1.5*  --   --    < > = values in this interval not displayed.    Significant Diagnostics:  I have reviewed all pertinent imaging results/findings within the past 24 hours.    Assessment/Plan:     Gastric carcinoma    68yo male with biopsy positive adenocarcinoma of the stomach and thickening of the distal esophagus    - NPO, EUS unsuccessful yesterday because of unstable a fib with RVR, cardiology now following  - CT with concerns for lymph node metastases along lesser curve and diffuse thickening of distal esophagus, will follow up results for surgical planning. Most likely will require neoadjuvant therapy with prehab prior to surgical intervention.  - prealbumin 8, would recommend PICC line placement and starting TPN for nutritional support  - will continue to follow along            Deloris Toussaint MD  General Surgery  [facility]

## 2018-04-14 LAB
ACANTHOCYTES BLD QL SMEAR: ABNORMAL
ANION GAP SERPL CALC-SCNC: 13 MMOL/L
ANISOCYTOSIS BLD QL SMEAR: ABNORMAL
AUER BODIES BLD QL SMEAR: ABNORMAL
BASO STIPL BLD QL SMEAR: ABNORMAL
BASOPHILS # BLD AUTO: 0 K/UL
BASOPHILS # BLD AUTO: ABNORMAL K/UL
BASOPHILS NFR BLD: 0 %
BASOPHILS NFR BLD: ABNORMAL %
BLASTS NFR BLD MANUAL: ABNORMAL %
BUN SERPL-MCNC: 12 MG/DL
BURR CELLS BLD QL SMEAR: ABNORMAL
CABOT RINGS BLD QL SMEAR: ABNORMAL
CALCIUM SERPL-MCNC: 8.5 MG/DL
CHLORIDE SERPL-SCNC: 102 MMOL/L
CO2 SERPL-SCNC: 21 MMOL/L
CREAT SERPL-MCNC: 0.7 MG/DL
DACRYOCYTES BLD QL SMEAR: ABNORMAL
DIFFERENTIAL METHOD: ABNORMAL
DIFFERENTIAL METHOD: ABNORMAL
DOHLE BOD BLD QL SMEAR: ABNORMAL
EOSINOPHIL # BLD AUTO: 0 K/UL
EOSINOPHIL # BLD AUTO: ABNORMAL K/UL
EOSINOPHIL NFR BLD: 0.8 %
EOSINOPHIL NFR BLD: ABNORMAL %
ERYTHROCYTE [DISTWIDTH] IN BLOOD BY AUTOMATED COUNT: 18.1 %
ERYTHROCYTE [DISTWIDTH] IN BLOOD BY AUTOMATED COUNT: ABNORMAL %
EST. GFR  (AFRICAN AMERICAN): >60 ML/MIN/1.73 M^2
EST. GFR  (NON AFRICAN AMERICAN): >60 ML/MIN/1.73 M^2
GIANT PLATELETS BLD QL SMEAR: ABNORMAL
GLUCOSE SERPL-MCNC: 87 MG/DL
HCT VFR BLD AUTO: 29.4 %
HCT VFR BLD AUTO: ABNORMAL %
HEINZ BOD BLD QL SMEAR: ABNORMAL
HGB BLD-MCNC: 10.3 G/DL
HGB BLD-MCNC: ABNORMAL G/DL
HGB C CRY RBC QL MICRO: ABNORMAL
HOWELL-JOLLY BOD BLD QL SMEAR: ABNORMAL
HYPOCHROMIA BLD QL SMEAR: ABNORMAL
IMM GRANULOCYTES # BLD AUTO: 0.1 K/UL
IMM GRANULOCYTES # BLD AUTO: ABNORMAL K/UL
IMM GRANULOCYTES NFR BLD AUTO: 2.1 %
IMM GRANULOCYTES NFR BLD AUTO: ABNORMAL %
LYMPHOCYTES # BLD AUTO: 0.8 K/UL
LYMPHOCYTES # BLD AUTO: ABNORMAL K/UL
LYMPHOCYTES NFR BLD: 15.5 %
LYMPHOCYTES NFR BLD: ABNORMAL %
MAGNESIUM SERPL-MCNC: 1.6 MG/DL
MCH RBC QN AUTO: 32.4 PG
MCH RBC QN AUTO: ABNORMAL PG
MCHC RBC AUTO-ENTMCNC: 35 G/DL
MCHC RBC AUTO-ENTMCNC: ABNORMAL G/DL
MCV RBC AUTO: 93 FL
MCV RBC AUTO: ABNORMAL FL
MEGAKARYOCYTIC FRAGMENTS: ABNORMAL
METAMYELOCYTES NFR BLD MANUAL: ABNORMAL %
MONOCYTES # BLD AUTO: 0.1 K/UL
MONOCYTES # BLD AUTO: ABNORMAL K/UL
MONOCYTES NFR BLD: 2.7 %
MONOCYTES NFR BLD: ABNORMAL %
MYELOCYTES NFR BLD MANUAL: ABNORMAL %
NEUTROPHILS # BLD AUTO: 3.8 K/UL
NEUTROPHILS # BLD AUTO: ABNORMAL K/UL
NEUTROPHILS NFR BLD: 78.9 %
NEUTROPHILS NFR BLD: ABNORMAL %
NEUTS BAND NFR BLD MANUAL: ABNORMAL %
NRBC BLD-RTO: 0 /100 WBC
NRBC BLD-RTO: ABNORMAL /100 WBC
OVALOCYTES BLD QL SMEAR: ABNORMAL
PAPPENHEIMER BOD BLD QL SMEAR: ABNORMAL
PLASMODIUM BLD QL SMEAR: ABNORMAL
PLATELET # BLD AUTO: 190 K/UL
PLATELET # BLD AUTO: ABNORMAL K/UL
PLATELET BLD QL SMEAR: ABNORMAL
PMV BLD AUTO: 10 FL
PMV BLD AUTO: ABNORMAL FL
POIKILOCYTOSIS BLD QL SMEAR: ABNORMAL
POLYCHROMASIA BLD QL SMEAR: ABNORMAL
POTASSIUM SERPL-SCNC: 4 MMOL/L
PROMYELOCYTES NFR BLD MANUAL: ABNORMAL %
RBC # BLD AUTO: 3.18 M/UL
RBC # BLD AUTO: ABNORMAL M/UL
RBC AGGLUT BLD QL: ABNORMAL
ROULEAUX BLD QL SMEAR: ABNORMAL
SCHISTOCYTES BLD QL SMEAR: ABNORMAL
SCHISTOCYTES BLD QL SMEAR: ABNORMAL
SICKLE CELLS BLD QL SMEAR: ABNORMAL
SMUDGE CELLS BLD QL SMEAR: ABNORMAL
SODIUM SERPL-SCNC: 136 MMOL/L
SPHEROCYTES BLD QL SMEAR: ABNORMAL
STOMATOCYTES BLD QL SMEAR: ABNORMAL
TARGETS BLD QL SMEAR: ABNORMAL
TOXIC GRANULES BLD QL SMEAR: ABNORMAL
WBC # BLD AUTO: 4.85 K/UL
WBC # BLD AUTO: ABNORMAL K/UL
WBC NRBC COR # BLD: ABNORMAL K/UL
WBC OTHER NFR BLD MANUAL: ABNORMAL %
WBC TOXIC VACUOLES BLD QL SMEAR: ABNORMAL

## 2018-04-14 PROCEDURE — 83735 ASSAY OF MAGNESIUM: CPT

## 2018-04-14 PROCEDURE — 20600001 HC STEP DOWN PRIVATE ROOM

## 2018-04-14 PROCEDURE — 94761 N-INVAS EAR/PLS OXIMETRY MLT: CPT

## 2018-04-14 PROCEDURE — 97165 OT EVAL LOW COMPLEX 30 MIN: CPT

## 2018-04-14 PROCEDURE — S0077 INJECTION, CLINDAMYCIN PHOSP: HCPCS | Performed by: PHYSICIAN ASSISTANT

## 2018-04-14 PROCEDURE — 25000003 PHARM REV CODE 250: Performed by: INTERNAL MEDICINE

## 2018-04-14 PROCEDURE — 85025 COMPLETE CBC W/AUTO DIFF WBC: CPT

## 2018-04-14 PROCEDURE — C9113 INJ PANTOPRAZOLE SODIUM, VIA: HCPCS | Performed by: PHYSICIAN ASSISTANT

## 2018-04-14 PROCEDURE — 99232 SBSQ HOSP IP/OBS MODERATE 35: CPT | Mod: ,,, | Performed by: INTERNAL MEDICINE

## 2018-04-14 PROCEDURE — 99233 SBSQ HOSP IP/OBS HIGH 50: CPT | Mod: ,,, | Performed by: INTERNAL MEDICINE

## 2018-04-14 PROCEDURE — 25000003 PHARM REV CODE 250: Performed by: PHYSICIAN ASSISTANT

## 2018-04-14 PROCEDURE — 36415 COLL VENOUS BLD VENIPUNCTURE: CPT

## 2018-04-14 PROCEDURE — 80048 BASIC METABOLIC PNL TOTAL CA: CPT

## 2018-04-14 PROCEDURE — 63600175 PHARM REV CODE 636 W HCPCS: Performed by: PHYSICIAN ASSISTANT

## 2018-04-14 RX ORDER — DEXTROSE, SODIUM CHLORIDE, SODIUM LACTATE, POTASSIUM CHLORIDE, AND CALCIUM CHLORIDE 5; .6; .31; .03; .02 G/100ML; G/100ML; G/100ML; G/100ML; G/100ML
INJECTION, SOLUTION INTRAVENOUS CONTINUOUS
Status: DISCONTINUED | OUTPATIENT
Start: 2018-04-14 | End: 2018-04-19 | Stop reason: HOSPADM

## 2018-04-14 RX ORDER — HYDRALAZINE HYDROCHLORIDE 25 MG/1
25 TABLET, FILM COATED ORAL EVERY 8 HOURS PRN
Status: DISCONTINUED | OUTPATIENT
Start: 2018-04-15 | End: 2018-04-19 | Stop reason: HOSPADM

## 2018-04-14 RX ADMIN — DEXTROSE, SODIUM CHLORIDE, SODIUM LACTATE, POTASSIUM CHLORIDE, AND CALCIUM CHLORIDE: 5; .6; .31; .03; .02 INJECTION, SOLUTION INTRAVENOUS at 05:04

## 2018-04-14 RX ADMIN — CLINDAMYCIN IN 5 PERCENT DEXTROSE 600 MG: 12 INJECTION, SOLUTION INTRAVENOUS at 06:04

## 2018-04-14 RX ADMIN — AZATHIOPRINE 150 MG: 50 TABLET ORAL at 09:04

## 2018-04-14 RX ADMIN — METHYLPREDNISOLONE SODIUM SUCCINATE 10 MG: 40 INJECTION, POWDER, FOR SOLUTION INTRAMUSCULAR; INTRAVENOUS at 09:04

## 2018-04-14 RX ADMIN — DEXTROSE 40 MG: 50 INJECTION, SOLUTION INTRAVENOUS at 09:04

## 2018-04-14 RX ADMIN — MORPHINE SULFATE 2 MG: 4 INJECTION INTRAVENOUS at 10:04

## 2018-04-14 RX ADMIN — CLINDAMYCIN IN 5 PERCENT DEXTROSE 600 MG: 12 INJECTION, SOLUTION INTRAVENOUS at 03:04

## 2018-04-14 RX ADMIN — CLINDAMYCIN IN 5 PERCENT DEXTROSE 600 MG: 12 INJECTION, SOLUTION INTRAVENOUS at 10:04

## 2018-04-14 NOTE — CARE UPDATE
Patient complaining of cellulitis to LLE.  Erythema and warmth to LLE , medially without blisters or purulence.  Consistent with patient's previous episode of cellulitis that he developed while hospitalized last week. Will start clinda 600 mg Q8 hours to treat for cellulitis.

## 2018-04-14 NOTE — MEDICAL/APP STUDENT
Hospital Medicine  Progress note    Team: [facility] HOSP MED B Dr Ware  Admit Date: 4/10/2018  LIO 4/14/2018  Code status: Full Code    Principal Problem:  Atrial fibrillation    Interval hx:    4/11: H&P note  4/12 - Chaz: U/S endoscopic upper cancelled for cardiovascular complication, SR converted to ST after intubation with hypotension. Given phenylephrine 100mcg.   4/13: Talk to GI about EGD. General surgery states most likely will require neoadjuvant therapy prior to surgical intervention.    4/14: LLE erythema and warmth medially. Started on clindamycin 600mg q8hrs. Scheduled for EGD Monday. Consider feeding tube early next week, possibly starting TPN today.     ROS     Constitutional: Increased Fatigue  Pain Scale: 0 /10  Respiratory: no cough or shortness of breath  Cardiovascular: no chest pain or palpitations  Gastrointestinal: no nausea or vomiting, no abdominal pain or change in bowel habits  Behavioral/Psych: no depression or anxiety      PEx  Temp:  [97.5 °F (36.4 °C)-98.3 °F (36.8 °C)]   Pulse:  [66-75]   Resp:  [16-19]   BP: (135-179)/(72-87)   SpO2:  [2 %-97 %]     Intake/Output Summary (Last 24 hours) at 04/14/18 0815  Last data filed at 04/14/18 0400   Gross per 24 hour   Intake              250 ml   Output              450 ml   Net             -200 ml       General Appearance: no acute distress   Heart: regular rate and rhythm  Respiratory: Normal respiratory effort, no crackles   Abdomen: Soft, non-tender; bowel sounds active  Skin: intact. IV sites ok.  LLE erythema and warmth medially  Neurologic:  No focal numbness or weakness  Mental status: Alert, oriented x 4, affect appropriate       Recent Labs  Lab 04/12/18  0427 04/12/18  1112 04/13/18  0545 04/14/18  0636   WBC 3.19* 3.67* 4.56 3.74*   HGB 10.0* 9.6* 10.1* 10.5*   HCT 29.9* 28.2* 29.7* 33.2*    151 214  --        Recent Labs  Lab 04/10/18  2005  04/11/18  0516  04/12/18  1112 04/12/18 2029 04/13/18  0545 04/14/18  0636      --  138  < > 139 138 140  --    K 3.8  --  2.9*  < > 3.3* 4.1 4.1  --    CL 98  --  101  < > 106 104 104  --    CO2 24  --  25  < > 22* 20* 22*  --    BUN 14  --  14  < > 10 13 14  --    CREATININE 0.9  --  0.7  < > 0.6 0.7 0.6  --    *  --  97  < > 88 120* 97  --    CALCIUM 9.0  --  8.3*  < > 7.9* 8.2* 8.3*  --    MG  --   < > 1.3*  --  1.5* 1.6 1.4* 1.6   PHOS  --   --  3.0  --  2.6* 3.3  --   --    LIPASE 43  --   --   --   --   --   --   --    AMYLASE 37  --   --   --   --   --   --   --    < > = values in this interval not displayed.    Recent Labs  Lab 04/10/18  2005   ALKPHOS 47*   ALT 16   AST 25   ALBUMIN 2.9*   PROT 6.4   BILITOT 1.5*   INR 1.1        Recent Labs  Lab 04/10/18  2357 04/12/18  2154 04/13/18  0741   POCTGLUCOSE 112* 113* 112*     Recent Labs      04/12/18   1112   CPK  10*   CPKMB  0.6   MB  6.0*   TROPONINI  <0.006       Scheduled Meds:   azaTHIOprine  150 mg Oral Daily    clindamycin (CLEOCIN) IVPB  600 mg Intravenous Q8H    diltiaZEM  10 mg Intravenous Once    methylPREDNISolone sodium succinate  10 mg Intravenous Daily    pantoprazole 40 mg in dextrose 5 % 100 mL infusion (ready to mix system)  40 mg Intravenous BID     Continuous Infusions:  As Needed:  acetaminophen, albuterol-ipratropium 2.5mg-0.5mg/3mL, bisacodyl, dextrose 50%, dextrose 50%, glucagon (human recombinant), glucose, glucose, morphine, morphine, omnipaque, ondansetron, promethazine (PHENERGAN) IVPB, senna-docusate 8.6-50 mg, sodium chloride 0.9%    Active Hospital Problems    Diagnosis  POA    *Atrial fibrillation [I48.91]  Unknown    Gastric carcinoma [C16.9]  Unknown    Weakness [R53.1]  Unknown    Esophageal spasm [K22.4]  Unknown    History of DVT (deep vein thrombosis) [Z86.718]  Not Applicable    Living-donor kidney transplant (sister) 1982 [Z94.0]  Not Applicable    Hypertension [I10]  Unknown    Chronic kidney disease (CKD), stage II (mild) [N18.2]  Yes     Chronic    Prophylactic  immunotherapy [Z29.8]  Not Applicable     -Continue home prednisone 10 mg and azathioprine 150 mg. May change pred to SM and hold  Aza for short period if unable to swallow.  -Follow with strict I/Os, daily weights, BP (goal <140/90), daily labs.    -Check immuknow cylex to better assess net IS.      Hypokalemia [E87.6]  Yes    Hypomagnesemia [E83.42]  Yes    Nonrheumatic aortic valve disorder [I35.9]  Unknown    Esophageal obstruction [K22.2]  Yes    Bilateral edema of lower extremity [R60.0]  Unknown    Dehydration [E86.0]  Unknown      Resolved Hospital Problems    Diagnosis Date Resolved POA   No resolved problems to display.       Overview: Mr. Elbert Connelly is a 67 y.o. male with medical problems including HTN, DVT/PE (IVC filter), renal disorder, and history of kidney transplant who presents with complaint of abdominal pain, fatigue, weakness, dizziness and esophageal obstruction.          Assessment and Plan for Problems addressed today:    * Esophageal obstruction     Esophageal spasm  Gastric carcnoma  - AES consult for EGD/EUS for gastric cancer  - Surgical oncology consult and appreciate recs  - Will need documentation from OSH  - Unable to tolerate saliva but protecting airway.  - Strict NPO, aspiration precautions  - Not currently on anticoagulation- will need to be restarted on this admission  - LR mIVF x12 hours  - 4/11 - [facility]: NPO for procedure/surgery and aspiration concern, IV hydration and trend labs for electrolytes . CT thorax/abdomen/pelvis w/IV contrast for staging  EUS tomorrow pending CT results  SLP consult for recs re: meds/diet   Surg Onc consultation - paged 12 noon  4/12: U/S endoscopy upper cancelled for cardiovascular reasons  4/13: follow up with GI about EGD  4/14: Scheduled for EGD Monday. Consider feeding tube early next week, possibly starting TPN today.        Cellulitis LLE  -LLE erythema and warmth medially. Started on clindamycin 600mg q8hrs on 4/13.     CKD Stage II  (mild)  S/P kidney transplant (Stable)     One functioning transplanted kidney  - KTM consult  - Avoid nephrotoxic agents, including contrast  - will defer abdominal imaging decision for cancer to KTM/surg-onc team  - Cr. 0.9, fanny WNL  - Mag/Phos   - Will change prednisone 10 mg PO daily to soludmedrol 10 mg IV daily while unable to tolerate PO  - Wife able to give patient imuran 150 mg PO daily crushed up in pudding at home.  No IV available, per pharmacy. Continue PO Imuran as long as patient can tolerate.   4/11 - maumus: Stable functioning kidney Cr .7 BUN 14, continue PO Imuran as long as tolerated.           Atrial Fibrillation  -Rate controlled  -Cardiology consulted: repeat ECHO when deemed appropriate,   -OSH records show pre-existing LBBB, no further evaluation needed.      History of DVT (deep vein thrombosis)     H/o DVT and PE  - Currently not on AC.  Was on warfarin for years, but switched to eliquis.  Eliquis recently stopped d/t inability to tolerate PO.  Will need to be restarted on eliquis after EGD/surgical intreventions. Patient does not know dosing.   - IVC filter currently in place  - Currently in hypercoagulable state  4/11 - [facility]: Continue treatment with eliquis after intervention with swallowing difficulty.        Dehydration     2/2 esophageal obstruction  - IVF replacement  4/11 - [facility]: Continue IV hydration       Bilateral edema of lower extremity     Chronic  BNP 65 in setting of obesity  - will order 2D ECHO  - no erythema, swelling appears equal          Weakness     PT/OT consult  4/11 - [facility]: PT/OT consult when ready from intervention of esophageal spasm and gastric cancer       Hypomagnesemia  -1.3 (4/11)-->1.6 (4/13) monitor    Hypertension     Stable  - Continue losartan 50 mg PO daily, cardizem 60 mg PO TID when able to tolerate PO         Hypokalemia(Resolved)        DVT PPx: enoxaparin        Discharge plan and follow up        Provider    I personally scribed for Dwight  SUSAN Ware MD on 04/14/2018 at 9:30 AM. Electronically signed by konstantin Eden on 04/14/2018 at 9:30 AM.

## 2018-04-14 NOTE — ASSESSMENT & PLAN NOTE
-CKD2 s/p kidney transplantation  remains stable Continue to monitor renal function and electrolytes, HTN, secondary hyperparathyroidism and other issues related to underlying ESRD.   -While NPO and awaiting further eval, I recommend maint IVF  -Avoid further renal insults, when possible: nephrotoxins, volume shifts, aim for BP within target.

## 2018-04-14 NOTE — PT/OT/SLP EVAL
Occupational Therapy   Evaluation    Name: Elbert Connelly  MRN: [id number]  Admitting Diagnosis:  Atrial fibrillation 2 Days Post-Op    Recommendations:     Discharge Recommendations:  (SS SNF (may progress to home with HH))  Discharge Equipment Recommendations:  none  Barriers to discharge:  Decreased caregiver support     History:     Occupational Profile:  Living Environment: Pt lives with spouse in a SSH with TTE. Bathroom has a small tub with shower chair.   Previous level of function: PTA, pt was utilizing rollator primarily for functional mobility and was independent with ADLs. Pt reports decline in activity tolerance approx 3 months ago at time to cancer dx.  Roles and Routines: Pt is a .   Equipment Owned:  rollator, shower chair, cane, straight (pt reports utilizing rollator most times)  Assistance upon Discharge: Pt's spouse works during the day but is able to assist when she is home.     Past Medical History:   Diagnosis Date    Cancer     stomach    Hypertension     Living-donor kidney transplant (sister) 1982 4/11/2018    Renal disorder 1984    bilat kidney transplant        Past Surgical History:   Procedure Laterality Date    APPENDECTOMY      w cancer tumor removal    CHOLECYSTECTOMY      COLON SURGERY      removed during appendectomy to remove tumors    KIDNEY TRANSPLANT Bilateral 1984       Subjective     Chief Complaint: pain in BLE's and dizziness   Patient/Family stated goals: return to PLOF  Communicated with: RN (mackenzie) prior to session. Pt agreeable to therapy evaluation.   Pain/Comfort:  · Pain Rating 1:  (pt reporting pain in BLE's with swelling noted )  · Pain Addressed 1: Reposition, Distraction  · Pain Rating Post-Intervention 1:  (pt continuing to report pain in BLE's)    Patients cultural, spiritual, Hoahaoism conflicts given the current situation: none reported     Objective:     Patient found with: telemetry, peripheral IV    General Precautions: Standard, fall,  NPO   Orthopedic Precautions:N/A   Braces: N/A     Occupational Performance:    Bed Mobility:    · Supine>Sit: moderate assistance  · Assist required for bringing BLE's off EOB and for attaining upright trunk positioning  · HOB elevated and utilizing side rail    Functional Mobility/Transfers:  · Sit>stand: minimum assistance  · From EOB x2 trials with bed in lowest position   · Functional Mobility: Pt engaging in functional mobility to simulate household distances approx 5 ft from EOB to bedside chair with CGA utilizing SPC with OT managing IV pole with reports of dizziness and with increased fatigue and no LOB.     Activities of Daily Living:  · LB Dressing: total assistance   · To don socks while seated at EOB  · Toileting: Contact guard assistance  · To stand for approx 1 minutes to utilize urinal     Cognitive/Visual Perceptual:  Cognitive/Psychosocial Skills:     -       Oriented to: Person, Place, Time and Situation   -       Follows Commands/attention:Follows one-step commands, Follows two-step commands and Follows multistep  commands  -       Communication: clear/fluent  -       Memory: No Deficits noted  -       Safety awareness/insight to disability: impaired   -       Mood/Affect/Coping skills/emotional control: Appropriate to situation  Visual/Perceptual:      -Intact    Physical Exam:  Balance:    · Sitting: pt seated at EOB for approx 4 minutes with intermittent UE support and requiring SBA   · Standing: pt requiring CGA for static standing while utilizing urinal and during functional mobility  Postural examination/scapula alignment:    -       Rounded shoulders  Skin integrity: Visible skin intact  Edema:  Mild in BLE's  Sensation:    -       Intact  Upper Extremity Range of Motion:     -       Right Upper Extremity: WFL  -       Left Upper Extremity: WFL  Upper Extremity Strength:    -       Right Upper Extremity: WFL  -       Left Upper Extremity: WFL   Strength:    -       Right Upper  Extremity: WFL  -       Left Upper Extremity: WFL  Fine Motor Coordination:    -       Intact  Gross motor coordination:   WFL    Patient left up in chair with all lines intact, call button in reach, RN notified and spouse present    [facility] 6 Click:  [facility] Total Score: 21    Treatment & Education:  -Pt found supine in bed with RN and spouse in the room  -Mod A for bed mobility to attain EOB seated position with use of siderails and increased assist required for trunk with reports of increased dizziness, resolving within 1-2 minutes  -Total A to don socks while seated at EOB d/t increased swelling in BLE's  -Min A for sit>stand from EOB x2 trials with bed in lowest position for standing to complete toileting with CGA for standing balance and prior to functional mobility to the bedside chair  -CGA utilizing SPC for functional mobility approx 5 ft. to the chair without any noted LOB, but c/o dizziness and increased fatigue upon completion  -Discussed POC and role of OT with pt and spouse   -RN notified of mobility level of assist at end of session  -Educated on importance of OOB mobility with assist and sitting UIC  -Whiteboard updated   Education:    Assessment:     Elbert Connelly is a 67 y.o. male with a medical diagnosis of Atrial fibrillation. Performance deficits affecting function are weakness, impaired endurance, impaired self care skills, impaired functional mobilty, impaired balance, impaired cardiopulmonary response to activity, decreased safety awareness, pain, decreased lower extremity function, edema.  Pt with good motivation to engage in therapy session and with good insight into condition. Pt with impaired functional activity tolerance requiring increased time for completion of functional tasks and increased rest breaks. Pt requiring mod A for bed mobility, min A for sit>stand from EOB, and CGA for functional mobility utilizing SPC. Pt would benefit from SS SNF at d/c to increase functional activity  "tolerance and independence with ADLs and functional mobility.     Rehab Prognosis:  good; patient would benefit from acute skilled OT services to address these deficits and reach maximum level of function.         Clinical Decision Makin.  OT Low:  "Pt evaluation falls under low complexity for evaluation coding due to performance deficits noted in 1-3 areas as stated above and 0 co-morbities affecting current functional status. Data obtained from problem focused assessments. No modifications or assistance was required for completion of evaluation. Only brief occupational profile and history review completed."     Plan:     Patient to be seen 3 x/week to address the above listed problems via self-care/home management, therapeutic activities, therapeutic exercises  · Plan of Care Expires: 18  · Plan of Care Reviewed with: patient, spouse    This Plan of care has been discussed with the patient who was involved in its development and understands and is in agreement with the identified goals and treatment plan    GOALS:    Occupational Therapy Goals        Problem: Occupational Therapy Goal    Goal Priority Disciplines Outcome Interventions   Occupational Therapy Goal     OT, PT/OT Ongoing (interventions implemented as appropriate)                    Time Tracking:     OT Date of Treatment: 18  OT Start Time: 759  OT Stop Time: 818  OT Total Time (min): 19 min    Billable Minutes:Evaluation 19 minutes    Lou Stallworth OT  2018    "

## 2018-04-14 NOTE — PLAN OF CARE
Problem: Occupational Therapy Goal  Goal: Occupational Therapy Goal  Outcome: Ongoing (interventions implemented as appropriate)  OT evaluation completed and POC initiated 4/14/2018. Pt would benefit from SS SNF to maximize independence and ensure safe return to PLOF.

## 2018-04-14 NOTE — SUBJECTIVE & OBJECTIVE
"  Subjective:   History of Present Illness:  lEbert is a 68 yo WM s/p living donor kidney transplant from sister in 1982 ([place]. [place] [facility] [place] [facility]) admitted for N/V and newly diagnosed gastric cancer for multidisciplinary GI and surgical oncology eval.  He reports no issues of rejection or other txp complications. He has been maintained on prednisone 10 mg daily and azathioprine 150 mg daily. (He reports taking cyclosporine for only a short period after txp).  He states he has been able to keep his IS down by crushing meds in applesauce, but cannot eat much; wife states liquids stay down for a few minutes before he vomits it back up. From kidney standpoint, he reports no past  issues except remote UTI "years ago," but he started with urinary incontinence in very recent past. He denies pain over allograft, frequency or urgency.    He states his current illness began in January when he started with vomiting. He states he has lost 60#s since then, which he attributes to difficulty tolerating PO intake. He notes he has been in an out of the hospital multiple times over the past 3 months for similar issue.  Last week he states and EGD w/ biopsies showed stomach cancer.        Hospital Course:  Developed afib, EGD canceled.    Interval History:  Elbert conitues to have dysphagia, vomiting, but states he can take his imuran OK. Currently NPO.    Past Medical, Surgical, Family, and Social History:   Unchanged from H&P.    Scheduled Meds:   azaTHIOprine  150 mg Oral Daily    clindamycin (CLEOCIN) IVPB  600 mg Intravenous Q8H    diltiaZEM  10 mg Intravenous Once    methylPREDNISolone sodium succinate  10 mg Intravenous Daily    pantoprazole 40 mg in dextrose 5 % 100 mL infusion (ready to mix system)  40 mg Intravenous BID     Continuous Infusions:  PRN Meds:acetaminophen, albuterol-ipratropium 2.5mg-0.5mg/3mL, bisacodyl, dextrose 50%, dextrose 50%, glucagon (human recombinant), glucose, glucose, morphine, " "morphine, omnipaque, ondansetron, promethazine (PHENERGAN) IVPB, senna-docusate 8.6-50 mg, sodium chloride 0.9%    Intake/Output - Last 3 Shifts       04/12 0700 - 04/13 0659 04/13 0700 - 04/14 0659 04/14 0700 - 04/15 0659    P.O. 0 0     I.V. (mL/kg) 800 (9) 200 (2.3)     IV Piggyback 50 50     Total Intake(mL/kg) 850 (9.6) 250 (2.8)     Urine (mL/kg/hr) 825 (0.4) 450 (0.2)     Stool  0 (0)     Total Output 825 450      Net +25 -200             Urine Occurrence 1 x 1 x     Stool Occurrence  0 x            Review of Systems   Constitutional: Positive for fatigue and unexpected weight change (60 # since Janurary 2018). Negative for chills.   Respiratory: Positive for choking. Negative for cough and shortness of breath.    Cardiovascular: Negative for chest pain and leg swelling.   Gastrointestinal: Positive for abdominal pain and vomiting.   Genitourinary: Negative for decreased urine volume, difficulty urinating and dysuria.   Musculoskeletal: Negative for gait problem.   Skin: Negative for rash.   Allergic/Immunologic: Positive for immunocompromised state.   Neurological: Positive for weakness. Negative for seizures.   Psychiatric/Behavioral: Positive for sleep disturbance.     Objective:     Vital Signs (Most Recent):  Temp: 97.5 °F (36.4 °C) (04/14/18 0800)  Pulse: 82 (04/14/18 0859)  Resp: 18 (04/14/18 0800)  BP: (!) 144/80 (04/14/18 0800)  SpO2: (!) 93 % (04/14/18 0800) Vital Signs (24h Range):  Temp:  [97.5 °F (36.4 °C)-98.3 °F (36.8 °C)] 97.5 °F (36.4 °C)  Pulse:  [68-82] 82  Resp:  [16-19] 18  SpO2:  [93 %-97 %] 93 %  BP: (135-179)/(72-87) 144/80     Weight: 88.8 kg (195 lb 12.3 oz)  Height: 5' 7" (170.2 cm)  Body mass index is 30.66 kg/m².    Physical Exam   Constitutional: He is oriented to person, place, and time. He is cooperative. No distress.   Eyes: Conjunctivae and lids are normal. No scleral icterus.   Cardiovascular: Normal rate and regular rhythm.    No murmur heard.  Pulmonary/Chest: Effort " normal and breath sounds normal. He has no rales.   Abdominal: Soft. He exhibits no mass. There is no hepatosplenomegaly. There is tenderness (TTP upper abd). There is no CVA tenderness. No hernia.   Musculoskeletal: Normal range of motion. He exhibits no edema.   Neurological: He is alert and oriented to person, place, and time.     Lab Results   Component Value Date    WBC SEE COMMENT 04/14/2018    HGB SEE COMMENT 04/14/2018    PLT SEE COMMENT 04/14/2018     04/14/2018    K 4.0 04/14/2018     04/14/2018    CO2 21 (L) 04/14/2018    BUN 12 04/14/2018    CREATININE 0.7 04/14/2018    EGFRNONAA >60.0 04/14/2018    CALCIUM 8.5 (L) 04/14/2018    PHOS 3.3 04/12/2018    ALBUMIN 2.9 (L) 04/10/2018    MG 1.6 04/14/2018    AST 25 04/10/2018    ALT 16 04/10/2018

## 2018-04-14 NOTE — PROGRESS NOTES
OSH Records were requested due to the presence of a LBBB.     EKG from 1/29/15 ([facility] in [place], [place])  Normal sinus rhythm, left axis deviation, LBBB (QRS 160ms).    MPI 2/13/18 ([facility] in [place], [place])  Mild ischemic in lateral and inferolateral myocardial walls in the proximal and mid ventricular and basal segments, possibly indicating CAD in the LCx.   There is no LV dilatation.  LVEF is normal.    No further evaluation of LBBB required at this time.

## 2018-04-14 NOTE — PROGRESS NOTES
"[facility]  Kidney Transplant  Progress Note      Reason for Follow-up: Reassessment of  recipient and management of immunosuppression.      Subjective:   History of Present Illness:  Elbert is a 68 yo WM s/p living donor kidney transplant from sister in 1982 [place] [facility] [place] admitted for N/V and newly diagnosed gastric cancer for multidisciplinary GI and surgical oncology eval.  He reports no issues of rejection or other txp complications. He has been maintained on prednisone 10 mg daily and azathioprine 150 mg daily. (He reports taking cyclosporine for only a short period after txp).  He states he has been able to keep his IS down by crushing meds in applesauce, but cannot eat much; wife states liquids stay down for a few minutes before he vomits it back up. From kidney standpoint, he reports no past  issues except remote UTI "years ago," but he started with urinary incontinence in very recent past. He denies pain over allograft, frequency or urgency.    He states his current illness began in January when he started with vomiting. He states he has lost 60#s since then, which he attributes to difficulty tolerating PO intake. He notes he has been in an out of the hospital multiple times over the past 3 months for similar issue.  Last week he states and EGD w/ biopsies showed stomach cancer.        Hospital Course:  Developed afib, EGD canceled.    Interval History:  Elbert conitues to have dysphagia, vomiting, but states he can take his imuran OK. Currently NPO.    Past Medical, Surgical, Family, and Social History:   Unchanged from H&P.    Scheduled Meds:   azaTHIOprine  150 mg Oral Daily    clindamycin (CLEOCIN) IVPB  600 mg Intravenous Q8H    diltiaZEM  10 mg Intravenous Once    methylPREDNISolone sodium succinate  10 mg Intravenous Daily    pantoprazole 40 mg in dextrose 5 % 100 mL infusion (ready to mix system)  40 mg Intravenous BID     Continuous Infusions:  PRN " "Meds:acetaminophen, albuterol-ipratropium 2.5mg-0.5mg/3mL, bisacodyl, dextrose 50%, dextrose 50%, glucagon (human recombinant), glucose, glucose, morphine, morphine, omnipaque, ondansetron, promethazine (PHENERGAN) IVPB, senna-docusate 8.6-50 mg, sodium chloride 0.9%    Intake/Output - Last 3 Shifts       04/12 0700 - 04/13 0659 04/13 0700 - 04/14 0659 04/14 0700 - 04/15 0659    P.O. 0 0     I.V. (mL/kg) 800 (9) 200 (2.3)     IV Piggyback 50 50     Total Intake(mL/kg) 850 (9.6) 250 (2.8)     Urine (mL/kg/hr) 825 (0.4) 450 (0.2)     Stool  0 (0)     Total Output 825 450      Net +25 -200             Urine Occurrence 1 x 1 x     Stool Occurrence  0 x            Review of Systems   Constitutional: Positive for fatigue and unexpected weight change (60 # since Janurary 2018). Negative for chills.   Respiratory: Positive for choking. Negative for cough and shortness of breath.    Cardiovascular: Negative for chest pain and leg swelling.   Gastrointestinal: Positive for abdominal pain and vomiting.   Genitourinary: Negative for decreased urine volume, difficulty urinating and dysuria.   Musculoskeletal: Negative for gait problem.   Skin: Negative for rash.   Allergic/Immunologic: Positive for immunocompromised state.   Neurological: Positive for weakness. Negative for seizures.   Psychiatric/Behavioral: Positive for sleep disturbance.     Objective:     Vital Signs (Most Recent):  Temp: 97.5 °F (36.4 °C) (04/14/18 0800)  Pulse: 82 (04/14/18 0859)  Resp: 18 (04/14/18 0800)  BP: (!) 144/80 (04/14/18 0800)  SpO2: (!) 93 % (04/14/18 0800) Vital Signs (24h Range):  Temp:  [97.5 °F (36.4 °C)-98.3 °F (36.8 °C)] 97.5 °F (36.4 °C)  Pulse:  [68-82] 82  Resp:  [16-19] 18  SpO2:  [93 %-97 %] 93 %  BP: (135-179)/(72-87) 144/80     Weight: 88.8 kg (195 lb 12.3 oz)  Height: 5' 7" (170.2 cm)  Body mass index is 30.66 kg/m².    Physical Exam   Constitutional: He is oriented to person, place, and time. He is cooperative. No distress. "   Eyes: Conjunctivae and lids are normal. No scleral icterus.   Cardiovascular: Normal rate and regular rhythm.    No murmur heard.  Pulmonary/Chest: Effort normal and breath sounds normal. He has no rales.   Abdominal: Soft. He exhibits no mass. There is no hepatosplenomegaly. There is tenderness (TTP upper abd). There is no CVA tenderness. No hernia.   Musculoskeletal: Normal range of motion. He exhibits no edema.   Neurological: He is alert and oriented to person, place, and time.     Lab Results   Component Value Date    WBC SEE COMMENT 04/14/2018    HGB SEE COMMENT 04/14/2018    PLT SEE COMMENT 04/14/2018     04/14/2018    K 4.0 04/14/2018     04/14/2018    CO2 21 (L) 04/14/2018    BUN 12 04/14/2018    CREATININE 0.7 04/14/2018    EGFRNONAA >60.0 04/14/2018    CALCIUM 8.5 (L) 04/14/2018    PHOS 3.3 04/12/2018    ALBUMIN 2.9 (L) 04/10/2018    MG 1.6 04/14/2018    AST 25 04/10/2018    ALT 16 04/10/2018          Assessment/Plan:     * Atrial fibrillation    -back in sinus  -Monitor K/Mg daily and replace IV prn        Chronic kidney disease (CKD), stage II (mild)    -CKD2 s/p kidney transplantation  remains stable Continue to monitor renal function and electrolytes, HTN, secondary hyperparathyroidism and other issues related to underlying ESRD.   -While NPO and awaiting further eval, I recommend maint IVF  -Avoid further renal insults, when possible: nephrotoxins, volume shifts, aim for BP within target.           Living-donor kidney transplant (sister) 1982    -Doing very well since transplant  -Cont IS and watch creat trend  -See CKD          Bilateral edema of lower extremity    -Hypoalbuminemia and immobility contributing factors.          Hypokalemia    -Corrected. Follow lab closely.           Hypomagnesemia    -Corrected; monitor since had arrhythmia           Prophylactic immunotherapy      -cont prednisone/azathioprine  -cylex pending (drawn Friday)        Gastric carcinoma    -gen surgery  suggesting neoadjuvant therapy with prehab prior to surgical intervention.                Marli Hernandez MD  Kidney Transplant  [facility]

## 2018-04-14 NOTE — PROGRESS NOTES
"Rn noted new redness from ankle to groin along interior aspect of L leg. Skin is hot to touch. Family stated pt had cellulitis approx 1 month ago, and "it looks like it's coming back." Notified KATIE Simons, with the team. To come to bedside. YURY VALADEZ.   "

## 2018-04-14 NOTE — PROGRESS NOTES
Hospital Medicine  Progress note    I personally performed the history, physical exam and medical decision making: and confirmed the accuracy of the information in the transcribed note.    Team: [facility] MED [address] [facility]  Admit Date: 4/10/2018  LIO 4/14/2018  Code status: Full Code    Principal Problem:  Atrial fibrillation    Interval hx:    4/11: H&P note  4/12 - Chaz: U/S endoscopic upper cancelled for cardiovascular complication, SR converted to ST after intubation with hypotension. Given phenylephrine 100mcg.   4/13: Talk to GI about EGD. General surgery states most likely will require neoadjuvant therapy prior to surgical intervention.    4/14: LLE erythema and warmth medially. Started on clindamycin 600mg q8hrs. Scheduled for EGD Monday. Consider feeding tube early next week, possibly starting TPN today.     ROS     Constitutional: Increased Fatigue  Pain Scale: 0 /10  Respiratory: no cough or shortness of breath  Cardiovascular: no chest pain or palpitations  Gastrointestinal: no nausea or vomiting, no abdominal pain or change in bowel habits  Behavioral/Psych: no depression or anxiety      PEx  Temp:  [97.4 °F (36.3 °C)-98.3 °F (36.8 °C)]   Pulse:  [68-97]   Resp:  [16-19]   BP: (131-179)/(75-87)   SpO2:  [93 %-99 %]     Intake/Output Summary (Last 24 hours) at 04/14/18 1524  Last data filed at 04/14/18 1502   Gross per 24 hour   Intake              300 ml   Output              525 ml   Net             -225 ml       General Appearance: no acute distress   Heart: regular rate and rhythm  Respiratory: Normal respiratory effort, no crackles   Abdomen: Soft, non-tender; bowel sounds active  Skin: intact. IV sites ok.  LLE erythema and warmth medially  Neurologic:  No focal numbness or weakness  Mental status: Alert, oriented x 4, affect appropriate       Recent Labs  Lab 04/13/18  0545 04/14/18  0636 04/14/18  1113   WBC 4.56 SEE COMMENT 4.85   HGB 10.1* SEE COMMENT 10.3*   HCT 29.7* SEE COMMENT 29.4*   PLT  214 SEE COMMENT 190       Recent Labs  Lab 04/10/18  2005  04/11/18  0516  04/12/18  1112 04/12/18 2029 04/13/18  0545 04/14/18  0636     --  138  < > 139 138 140 136   K 3.8  --  2.9*  < > 3.3* 4.1 4.1 4.0   CL 98  --  101  < > 106 104 104 102   CO2 24  --  25  < > 22* 20* 22* 21*   BUN 14  --  14  < > 10 13 14 12   CREATININE 0.9  --  0.7  < > 0.6 0.7 0.6 0.7   *  --  97  < > 88 120* 97 87   CALCIUM 9.0  --  8.3*  < > 7.9* 8.2* 8.3* 8.5*   MG  --   < > 1.3*  --  1.5* 1.6 1.4* 1.6   PHOS  --   --  3.0  --  2.6* 3.3  --   --    LIPASE 43  --   --   --   --   --   --   --    AMYLASE 37  --   --   --   --   --   --   --    < > = values in this interval not displayed.    Recent Labs  Lab 04/10/18  2005   ALKPHOS 47*   ALT 16   AST 25   ALBUMIN 2.9*   PROT 6.4   BILITOT 1.5*   INR 1.1        Recent Labs  Lab 04/10/18  2357 04/12/18  2154 04/13/18  0741   POCTGLUCOSE 112* 113* 112*     Recent Labs      04/12/18   1112   CPK  10*   CPKMB  0.6   MB  6.0*   TROPONINI  <0.006       Scheduled Meds:   azaTHIOprine  150 mg Oral Daily    clindamycin (CLEOCIN) IVPB  600 mg Intravenous Q8H    diltiaZEM  10 mg Intravenous Once    methylPREDNISolone sodium succinate  10 mg Intravenous Daily    pantoprazole 40 mg in dextrose 5 % 100 mL infusion (ready to mix system)  40 mg Intravenous BID     Continuous Infusions:   dextrose 5% lactated ringers       As Needed:  acetaminophen, albuterol-ipratropium 2.5mg-0.5mg/3mL, bisacodyl, dextrose 50%, dextrose 50%, glucagon (human recombinant), glucose, glucose, morphine, morphine, omnipaque, ondansetron, promethazine (PHENERGAN) IVPB, senna-docusate 8.6-50 mg, sodium chloride 0.9%    Active Hospital Problems    Diagnosis  POA    *Atrial fibrillation [I48.91]  Unknown    Gastric carcinoma [C16.9]  Unknown    Weakness [R53.1]  Unknown    Esophageal spasm [K22.4]  Unknown    History of DVT (deep vein thrombosis) [Z86.718]  Not Applicable    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 May change pred to SM and hold  Aza for short period if unable to swallow.  -Follow with strict I/Os, daily weights, BP (goal <140/90), daily labs.    -Check immuknow cylex to better assess net IS.      Hypokalemia [E87.6]  Yes    Hypomagnesemia [E83.42]  Yes    Nonrheumatic aortic valve disorder [I35.9]  Unknown    Esophageal obstruction [K22.2]  Yes    Bilateral edema of lower extremity [R60.0]  Unknown    Dehydration [E86.0]  Unknown      Resolved Hospital Problems    Diagnosis Date Resolved POA   No resolved problems to display.       Overview: Mr. Elbert Connelly is a 67 y.o. male with medical problems including HTN, DVT/PE (IVC filter), renal disorder, and history of kidney transplant who presents with complaint of abdominal pain, fatigue, weakness, dizziness and esophageal obstruction.          Assessment and Plan for Problems addressed today:    * Esophageal obstruction     Esophageal spasm  Gastric carcnoma  - AES consult for EGD/EUS for gastric cancer  - Surgical oncology consult and appreciate recs  - Will need documentation from OSH  - Unable to tolerate saliva but protecting airway.  - Strict NPO, aspiration precautions  - Not currently on anticoagulation- will need to be restarted on this admission  - LR mIVF x12 hours  - 4/11 - Maumus: NPO for procedure/surgery and aspiration concern, IV hydration and trend labs for electrolytes . CT thorax/abdomen/pelvis w/IV contrast for staging  EUS tomorrow pending CT results  SLP consult for recs re: meds/diet   Surg Onc consultation - paged 12 noon  4/12: U/S endoscopy upper cancelled for cardiovascular reasons  4/13: follow up with GI about EGD  4/14: Scheduled for EGD Monday. Consider feeding tube  early next week, possibly starting TPN today.        Cellulitis LLE  -LLE erythema and warmth medially. Started on clindamycin 600mg q8hrs on 4/13.     CKD Stage II (mild)  S/P kidney transplant (Stable)     One functioning transplanted kidney  - KTM consult  - Avoid nephrotoxic agents, including contrast  - will defer abdominal imaging decision for cancer to KTM/surg-onc team  - Cr. 0.9, lytes WNL  - Mag/Phos   - Will change prednisone 10 mg PO daily to soludmedrol 10 mg IV daily while unable to tolerate PO  - Wife able to give patient imuran 150 mg PO daily crushed up in pudding at home.  No IV available, per pharmacy. Continue PO Imuran as long as patient can tolerate.   4/11 - maumus: Stable functioning kidney Cr .7 BUN 14, continue PO Imuran as long as tolerated.           Atrial Fibrillation  -Rate controlled  -Cardiology consulted: repeat ECHO when deemed appropriate,   -OSH records show pre-existing LBBB, no further evaluation needed.      History of DVT (deep vein thrombosis)     H/o DVT and PE  - Currently not on AC.  Was on warfarin for years, but switched to eliquis.  Eliquis recently stopped d/t inability to tolerate PO.  Will need to be restarted on eliquis after EGD/surgical intreventions. Patient does not know dosing.   - IVC filter currently in place  - Currently in hypercoagulable state  4/11 - Maumus: Continue treatment with eliquis after intervention with swallowing difficulty.        Dehydration     2/2 esophageal obstruction  - IVF replacement  4/11 - Maumus: Continue IV hydration       Bilateral edema of lower extremity     Chronic  BNP 64 in setting of obesity  - will order 2D ECHO  - no erythema, swelling appears equal          Weakness     PT/OT consult  4/11 - Maumus: PT/OT consult when ready from intervention of esophageal spasm and gastric cancer       Hypomagnesemia  -1.3 (4/11)-->1.6 (4/13) monitor    Hypertension     Stable  - Continue losartan 50 mg PO daily, cardizem 60 mg PO TID  when able to tolerate PO         Hypokalemia(Resolved)        DVT PPx: enoxaparin        Discharge plan and follow up        Provider    I personally scribed for Dwight Ware MD on 04/14/2018 at 9:30 AM. Electronically signed by konstantin Eden on 04/14/2018 at 9:30 AM.

## 2018-04-14 NOTE — PLAN OF CARE
Problem: Patient Care Overview  Goal: Plan of Care Review  Outcome: Ongoing (interventions implemented as appropriate)  Pt free of falls and injury throughout the shift. Skin is CDI with blanchable redness noted to saccrum. Suspected cellulitis noted to LLE, started on IV abx. Abd pain controlled with IV Morphine SO4. Pt to have esophageal US on Monday. POC reviewed and questions answered. Pt tolerating plan of care.

## 2018-04-15 PROBLEM — E83.39 HYPOPHOSPHATEMIA: Status: ACTIVE | Noted: 2018-04-15

## 2018-04-15 LAB
ANION GAP SERPL CALC-SCNC: 11 MMOL/L
ANION GAP SERPL CALC-SCNC: 9 MMOL/L
BACTERIA BLD CULT: NORMAL
BACTERIA BLD CULT: NORMAL
BASOPHILS # BLD AUTO: 0.01 K/UL
BASOPHILS NFR BLD: 0.2 %
BUN SERPL-MCNC: 11 MG/DL
BUN SERPL-MCNC: 7 MG/DL
CALCIUM SERPL-MCNC: 8.4 MG/DL
CALCIUM SERPL-MCNC: 8.4 MG/DL
CHLORIDE SERPL-SCNC: 100 MMOL/L
CHLORIDE SERPL-SCNC: 102 MMOL/L
CO2 SERPL-SCNC: 25 MMOL/L
CO2 SERPL-SCNC: 29 MMOL/L
CREAT SERPL-MCNC: 0.7 MG/DL
CREAT SERPL-MCNC: 0.7 MG/DL
DIFFERENTIAL METHOD: ABNORMAL
EOSINOPHIL # BLD AUTO: 0 K/UL
EOSINOPHIL NFR BLD: 1 %
ERYTHROCYTE [DISTWIDTH] IN BLOOD BY AUTOMATED COUNT: 17.7 %
EST. GFR  (AFRICAN AMERICAN): >60 ML/MIN/1.73 M^2
EST. GFR  (AFRICAN AMERICAN): >60 ML/MIN/1.73 M^2
EST. GFR  (NON AFRICAN AMERICAN): >60 ML/MIN/1.73 M^2
EST. GFR  (NON AFRICAN AMERICAN): >60 ML/MIN/1.73 M^2
GLUCOSE SERPL-MCNC: 104 MG/DL
GLUCOSE SERPL-MCNC: 125 MG/DL
HCT VFR BLD AUTO: 28.8 %
HGB BLD-MCNC: 9.5 G/DL
IMM GRANULOCYTES # BLD AUTO: 0.03 K/UL
IMM GRANULOCYTES NFR BLD AUTO: 0.7 %
LYMPHOCYTES # BLD AUTO: 1 K/UL
LYMPHOCYTES NFR BLD: 25.9 %
MAGNESIUM SERPL-MCNC: 1.1 MG/DL
MAGNESIUM SERPL-MCNC: 1.6 MG/DL
MCH RBC QN AUTO: 32.3 PG
MCHC RBC AUTO-ENTMCNC: 33 G/DL
MCV RBC AUTO: 98 FL
MONOCYTES # BLD AUTO: 0.1 K/UL
MONOCYTES NFR BLD: 2.5 %
NEUTROPHILS # BLD AUTO: 2.8 K/UL
NEUTROPHILS NFR BLD: 69.7 %
NRBC BLD-RTO: 0 /100 WBC
PHOSPHATE SERPL-MCNC: 2.3 MG/DL
PHOSPHATE SERPL-MCNC: 3.3 MG/DL
PLATELET # BLD AUTO: 173 K/UL
PMV BLD AUTO: 10.1 FL
POCT GLUCOSE: 118 MG/DL (ref 70–110)
POTASSIUM SERPL-SCNC: 3.7 MMOL/L
POTASSIUM SERPL-SCNC: 3.8 MMOL/L
RBC # BLD AUTO: 2.94 M/UL
SODIUM SERPL-SCNC: 138 MMOL/L
SODIUM SERPL-SCNC: 138 MMOL/L
WBC # BLD AUTO: 4.02 K/UL

## 2018-04-15 PROCEDURE — 36415 COLL VENOUS BLD VENIPUNCTURE: CPT

## 2018-04-15 PROCEDURE — 83735 ASSAY OF MAGNESIUM: CPT

## 2018-04-15 PROCEDURE — 25000003 PHARM REV CODE 250: Performed by: INTERNAL MEDICINE

## 2018-04-15 PROCEDURE — 63600175 PHARM REV CODE 636 W HCPCS: Performed by: INTERNAL MEDICINE

## 2018-04-15 PROCEDURE — 20600001 HC STEP DOWN PRIVATE ROOM

## 2018-04-15 PROCEDURE — 99233 SBSQ HOSP IP/OBS HIGH 50: CPT | Mod: ,,, | Performed by: INTERNAL MEDICINE

## 2018-04-15 PROCEDURE — 83735 ASSAY OF MAGNESIUM: CPT | Mod: 91

## 2018-04-15 PROCEDURE — 25000003 PHARM REV CODE 250

## 2018-04-15 PROCEDURE — 80048 BASIC METABOLIC PNL TOTAL CA: CPT | Mod: 91

## 2018-04-15 PROCEDURE — S0077 INJECTION, CLINDAMYCIN PHOSP: HCPCS | Performed by: PHYSICIAN ASSISTANT

## 2018-04-15 PROCEDURE — 25000003 PHARM REV CODE 250: Performed by: PHYSICIAN ASSISTANT

## 2018-04-15 PROCEDURE — 93005 ELECTROCARDIOGRAM TRACING: CPT

## 2018-04-15 PROCEDURE — C9113 INJ PANTOPRAZOLE SODIUM, VIA: HCPCS | Performed by: PHYSICIAN ASSISTANT

## 2018-04-15 PROCEDURE — 63600175 PHARM REV CODE 636 W HCPCS: Performed by: PHYSICIAN ASSISTANT

## 2018-04-15 PROCEDURE — 93010 ELECTROCARDIOGRAM REPORT: CPT | Mod: ,,, | Performed by: INTERNAL MEDICINE

## 2018-04-15 PROCEDURE — 85025 COMPLETE CBC W/AUTO DIFF WBC: CPT

## 2018-04-15 PROCEDURE — 84100 ASSAY OF PHOSPHORUS: CPT

## 2018-04-15 PROCEDURE — 93010 ELECTROCARDIOGRAM REPORT: CPT | Mod: 76,,, | Performed by: INTERNAL MEDICINE

## 2018-04-15 RX ORDER — MAGNESIUM SULFATE HEPTAHYDRATE 40 MG/ML
2 INJECTION, SOLUTION INTRAVENOUS ONCE
Status: COMPLETED | OUTPATIENT
Start: 2018-04-15 | End: 2018-04-16

## 2018-04-15 RX ORDER — DIGOXIN 0.25 MG/ML
0.5 INJECTION INTRAMUSCULAR; INTRAVENOUS ONCE
Status: COMPLETED | OUTPATIENT
Start: 2018-04-15 | End: 2018-04-15

## 2018-04-15 RX ORDER — MAGNESIUM SULFATE HEPTAHYDRATE 40 MG/ML
2 INJECTION, SOLUTION INTRAVENOUS
Status: COMPLETED | OUTPATIENT
Start: 2018-04-15 | End: 2018-04-15

## 2018-04-15 RX ORDER — METOPROLOL TARTRATE 1 MG/ML
5 INJECTION, SOLUTION INTRAVENOUS EVERY 5 MIN PRN
Status: DISCONTINUED | OUTPATIENT
Start: 2018-04-15 | End: 2018-04-19 | Stop reason: HOSPADM

## 2018-04-15 RX ORDER — POTASSIUM CHLORIDE 7.45 MG/ML
10 INJECTION INTRAVENOUS ONCE
Status: COMPLETED | OUTPATIENT
Start: 2018-04-16 | End: 2018-04-16

## 2018-04-15 RX ORDER — METOPROLOL TARTRATE 1 MG/ML
INJECTION, SOLUTION INTRAVENOUS
Status: COMPLETED
Start: 2018-04-15 | End: 2018-04-15

## 2018-04-15 RX ORDER — DILTIAZEM HYDROCHLORIDE 5 MG/ML
10 INJECTION INTRAVENOUS ONCE
Status: DISCONTINUED | OUTPATIENT
Start: 2018-04-15 | End: 2018-04-15

## 2018-04-15 RX ADMIN — METOPROLOL TARTRATE 5 MG: 5 INJECTION, SOLUTION INTRAVENOUS at 07:04

## 2018-04-15 RX ADMIN — MAGNESIUM SULFATE IN WATER 2 G: 40 INJECTION, SOLUTION INTRAVENOUS at 12:04

## 2018-04-15 RX ADMIN — METOPROLOL TARTRATE 5 MG: 5 INJECTION INTRAVENOUS at 08:04

## 2018-04-15 RX ADMIN — CLINDAMYCIN IN 5 PERCENT DEXTROSE 600 MG: 12 INJECTION, SOLUTION INTRAVENOUS at 03:04

## 2018-04-15 RX ADMIN — DEXTROSE 40 MG: 50 INJECTION, SOLUTION INTRAVENOUS at 11:04

## 2018-04-15 RX ADMIN — ONDANSETRON 4 MG: 4 TABLET, ORALLY DISINTEGRATING ORAL at 05:04

## 2018-04-15 RX ADMIN — POTASSIUM BICARBONATE 50 MEQ: 25 TABLET, EFFERVESCENT ORAL at 10:04

## 2018-04-15 RX ADMIN — SODIUM CHLORIDE, SODIUM LACTATE, POTASSIUM CHLORIDE, AND CALCIUM CHLORIDE 500 ML: .6; .31; .03; .02 INJECTION, SOLUTION INTRAVENOUS at 10:04

## 2018-04-15 RX ADMIN — DIGOXIN 0.5 MG: 0.25 INJECTION INTRAMUSCULAR; INTRAVENOUS at 11:04

## 2018-04-15 RX ADMIN — CLINDAMYCIN IN 5 PERCENT DEXTROSE 600 MG: 12 INJECTION, SOLUTION INTRAVENOUS at 06:04

## 2018-04-15 RX ADMIN — MAGNESIUM SULFATE IN WATER 2 G: 40 INJECTION, SOLUTION INTRAVENOUS at 04:04

## 2018-04-15 RX ADMIN — SODIUM PHOSPHATE, MONOBASIC, MONOHYDRATE 39.99 MMOL: 276; 142 INJECTION, SOLUTION INTRAVENOUS at 06:04

## 2018-04-15 RX ADMIN — DEXTROSE 40 MG: 50 INJECTION, SOLUTION INTRAVENOUS at 08:04

## 2018-04-15 RX ADMIN — AZATHIOPRINE 150 MG: 50 TABLET ORAL at 08:04

## 2018-04-15 RX ADMIN — METHYLPREDNISOLONE SODIUM SUCCINATE 10 MG: 40 INJECTION, POWDER, FOR SOLUTION INTRAMUSCULAR; INTRAVENOUS at 08:04

## 2018-04-15 NOTE — SUBJECTIVE & OBJECTIVE
"  Subjective:   History of Present Illness:  Elbert is a 66 yo WM s/p living donor kidney transplant from sister in 1982 (Dr. Sow at [facility] [place], placed in [facility]) admitted for N/V and newly diagnosed gastric cancer for multidisciplinary GI and surgical oncology eval.  He reports no issues of rejection or other txp complications. He has been maintained on prednisone 10 mg daily and azathioprine 150 mg daily. (He reports taking cyclosporine for only a short period after txp).  He states he has been able to keep his IS down by crushing meds in applesauce, but cannot eat much; wife states liquids stay down for a few minutes before he vomits it back up. From kidney standpoint, he reports no past  issues except remote UTI "years ago," but he started with urinary incontinence in very recent past. He denies pain over allograft, frequency or urgency.    He states his current illness began in January when he started with vomiting. He states he has lost 60#s since then, which he attributes to difficulty tolerating PO intake. He notes he has been in an out of the hospital multiple times over the past 3 months for similar issue.  Last week he states and EGD w/ biopsies showed stomach cancer.    Interval History:  Remains NPO on IV LR, waiting for EGD tomorrow. Labs this AM showed Mg- 1.1--> I ordered replacement earlier in day. Phos added to Am lab since not checked in last couple of days.  He notes GI issues worse at night. He denies any kidney-related complaints, such as pain over allograft, flank pain, dysuria, frequency, or other LUTS. Reports no fever, remains NPO.    Past Medical, Surgical, Family, and Social History:   Unchanged from H&P.    Scheduled Meds:   azaTHIOprine  150 mg Oral Daily    clindamycin (CLEOCIN) IVPB  600 mg Intravenous Q8H    diltiaZEM  10 mg Intravenous Once    magnesium sulfate IVPB  2 g Intravenous Q2H    methylPREDNISolone sodium succinate  10 mg Intravenous Daily    pantoprazole 40 mg in " "dextrose 5 % 100 mL infusion (ready to mix system)  40 mg Intravenous BID     Continuous Infusions:   dextrose 5% lactated ringers 50 mL/hr at 04/14/18 1702     PRN Meds:acetaminophen, albuterol-ipratropium 2.5mg-0.5mg/3mL, bisacodyl, dextrose 50%, dextrose 50%, glucagon (human recombinant), glucose, glucose, hydrALAZINE, morphine, omnipaque, ondansetron, promethazine (PHENERGAN) IVPB, senna-docusate 8.6-50 mg, sodium chloride 0.9%    Intake/Output - Last 3 Shifts       04/13 0700 - 04/14 0659 04/14 0700 - 04/15 0659 04/15 0700 - 04/16 0659    P.O. 0 0     I.V. (mL/kg) 200 (2.3) 100 (1.1)     IV Piggyback 50 50     Total Intake(mL/kg) 250 (2.8) 150 (1.7)     Urine (mL/kg/hr) 450 (0.2) 875 (0.4)     Stool 0 (0) 0 (0)     Total Output 450 875      Net -200 -725             Urine Occurrence 1 x      Stool Occurrence 0 x 0 x          Review of Systems   Constitutional: Positive for fatigue and unexpected weight change (60 # since Janurary 2018). Negative for chills.   Respiratory: Negative for cough and shortness of breath.    Cardiovascular: Negative for chest pain and leg swelling.   Gastrointestinal: Positive for abdominal pain. Negative for nausea and vomiting.   Genitourinary: Negative for decreased urine volume, difficulty urinating and dysuria.   Skin: Negative for rash.   Allergic/Immunologic: Positive for immunocompromised state.      Objective:     Vital Signs (Most Recent):  Temp: 98.1 °F (36.7 °C) (04/15/18 1127)  Pulse: 75 (04/15/18 1127)  Resp: 17 (04/15/18 1127)  BP: (!) 153/71 (04/15/18 1127)  SpO2: 95 % (04/15/18 1127) Vital Signs (24h Range):  Temp:  [96 °F (35.6 °C)-98.5 °F (36.9 °C)] 98.1 °F (36.7 °C)  Pulse:  [58-78] 75  Resp:  [15-18] 17  SpO2:  [95 %-100 %] 95 %  BP: (148-181)/(71-95) 153/71     Weight: 88.8 kg (195 lb 12.3 oz)  Height: 5' 7" (170.2 cm)  Body mass index is 30.66 kg/m².    Physical Exam   Constitutional: He is oriented to person, place, and time. He is cooperative.   Eyes: Lids " are normal.   Cardiovascular: Normal rate and regular rhythm.    No murmur heard.  Pulmonary/Chest: Effort normal and breath sounds normal. He has no rales.   Abdominal: Soft. He exhibits no distension. There is tenderness (TTP upper abd). There is no guarding.   Musculoskeletal: Normal range of motion. He exhibits no edema.   Neurological: He is alert and oriented to person, place, and time.     Laboratory:  Lab Results   Component Value Date    WBC 4.02 04/15/2018    HGB 9.5 (L) 04/15/2018     04/15/2018     04/15/2018    K 3.7 04/15/2018     04/15/2018    CO2 25 04/15/2018    BUN 11 04/15/2018    CREATININE 0.7 04/15/2018    EGFRNONAA >60.0 04/15/2018    CALCIUM 8.4 (L) 04/15/2018    PHOS 2.3 (L) 04/15/2018    ALBUMIN 2.9 (L) 04/10/2018    MG 1.1 (L) 04/15/2018    AST 25 04/10/2018    ALT 16 04/10/2018

## 2018-04-15 NOTE — PROGRESS NOTES
04/14/18 2357   Vital Signs   BP (!) 178/95   MAP (mmHg) 126   BP Location Right arm   Patient Position Lying     Pt's BP listed above. Pt had just finished vomiting. States he feels much better. Notified PEDRO Maravilla. Instructed to continue to monitor and placed orders for PRN Hydralazine.

## 2018-04-15 NOTE — PROGRESS NOTES
"[facility]  Kidney Transplant  Progress Note      Reason for Follow-up: Reassessment of  recipient and management of immunosuppression.    Subjective:   History of Present Illness:  Elbert is a 68 yo WM s/p living donor kidney transplant from sister in 1982 (Dr. Sow at [facility] [place], placed in [facility]) admitted for N/V and newly diagnosed gastric cancer for multidisciplinary GI and surgical oncology eval.  He reports no issues of rejection or other txp complications. He has been maintained on prednisone 10 mg daily and azathioprine 150 mg daily. (He reports taking cyclosporine for only a short period after txp).  He states he has been able to keep his IS down by crushing meds in applesauce, but cannot eat much; wife states liquids stay down for a few minutes before he vomits it back up. From kidney standpoint, he reports no past  issues except remote UTI "years ago," but he started with urinary incontinence in very recent past. He denies pain over allograft, frequency or urgency.    He states his current illness began in January when he started with vomiting. He states he has lost 60#s since then, which he attributes to difficulty tolerating PO intake. He notes he has been in an out of the hospital multiple times over the past 3 months for similar issue.  Last week he states and EGD w/ biopsies showed stomach cancer.    Interval History:  Remains NPO on IV LR, waiting for EGD tomorrow. Labs this AM showed Mg- 1.1--> I ordered replacement earlier in day. Phos added to Am lab since not checked in last couple of days.  He notes GI issues worse at night. He denies any kidney-related complaints, such as pain over allograft, flank pain, dysuria, frequency, or other LUTS. Reports no fever, remains NPO.    Past Medical, Surgical, Family, and Social History:   Unchanged from H&P.    Scheduled Meds:   azaTHIOprine  150 mg Oral Daily    clindamycin (CLEOCIN) IVPB  600 mg Intravenous Q8H    diltiaZEM  10 mg " "Intravenous Once    magnesium sulfate IVPB  2 g Intravenous Q2H    methylPREDNISolone sodium succinate  10 mg Intravenous Daily    pantoprazole 40 mg in dextrose 5 % 100 mL infusion (ready to mix system)  40 mg Intravenous BID     Continuous Infusions:   dextrose 5% lactated ringers 50 mL/hr at 04/14/18 1702     PRN Meds:acetaminophen, albuterol-ipratropium 2.5mg-0.5mg/3mL, bisacodyl, dextrose 50%, dextrose 50%, glucagon (human recombinant), glucose, glucose, hydrALAZINE, morphine, omnipaque, ondansetron, promethazine (PHENERGAN) IVPB, senna-docusate 8.6-50 mg, sodium chloride 0.9%    Intake/Output - Last 3 Shifts       04/13 0700 - 04/14 0659 04/14 0700 - 04/15 0659 04/15 0700 - 04/16 0659    P.O. 0 0     I.V. (mL/kg) 200 (2.3) 100 (1.1)     IV Piggyback 50 50     Total Intake(mL/kg) 250 (2.8) 150 (1.7)     Urine (mL/kg/hr) 450 (0.2) 875 (0.4)     Stool 0 (0) 0 (0)     Total Output 450 875      Net -200 -725             Urine Occurrence 1 x      Stool Occurrence 0 x 0 x          Review of Systems   Constitutional: Positive for fatigue and unexpected weight change (60 # since Janurary 2018). Negative for chills.   Respiratory: Negative for cough and shortness of breath.    Cardiovascular: Negative for chest pain and leg swelling.   Gastrointestinal: Positive for abdominal pain. Negative for nausea and vomiting.   Genitourinary: Negative for decreased urine volume, difficulty urinating and dysuria.   Skin: Negative for rash.   Allergic/Immunologic: Positive for immunocompromised state.      Objective:     Vital Signs (Most Recent):  Temp: 98.1 °F (36.7 °C) (04/15/18 1127)  Pulse: 75 (04/15/18 1127)  Resp: 17 (04/15/18 1127)  BP: (!) 153/71 (04/15/18 1127)  SpO2: 95 % (04/15/18 1127) Vital Signs (24h Range):  Temp:  [96 °F (35.6 °C)-98.5 °F (36.9 °C)] 98.1 °F (36.7 °C)  Pulse:  [58-78] 75  Resp:  [15-18] 17  SpO2:  [95 %-100 %] 95 %  BP: (148-181)/(71-95) 153/71     Weight: 88.8 kg (195 lb 12.3 oz)  Height: 5' 7" " (170.2 cm)  Body mass index is 30.66 kg/m².    Physical Exam   Constitutional: He is oriented to person, place, and time. He is cooperative.   Eyes: Lids are normal.   Cardiovascular: Normal rate and regular rhythm.    No murmur heard.  Pulmonary/Chest: Effort normal and breath sounds normal. He has no rales.   Abdominal: Soft. He exhibits no distension. There is tenderness (TTP upper abd). There is no guarding.   Musculoskeletal: Normal range of motion. He exhibits no edema.   Neurological: He is alert and oriented to person, place, and time.     Laboratory:  Lab Results   Component Value Date    WBC 4.02 04/15/2018    HGB 9.5 (L) 04/15/2018     04/15/2018     04/15/2018    K 3.7 04/15/2018     04/15/2018    CO2 25 04/15/2018    BUN 11 04/15/2018    CREATININE 0.7 04/15/2018    EGFRNONAA >60.0 04/15/2018    CALCIUM 8.4 (L) 04/15/2018    PHOS 2.3 (L) 04/15/2018    ALBUMIN 2.9 (L) 04/10/2018    MG 1.1 (L) 04/15/2018    AST 25 04/10/2018    ALT 16 04/10/2018        Assessment/Plan:     * Atrial fibrillation    -back in sinus  -Monitor K/Mg daily and replace IV prn        Chronic kidney disease (CKD), stage II (mild)    -CKD2 s/p kidney transplantation  remains stable Continue to monitor renal function and electrolytes, HTN, secondary hyperparathyroidism and other issues related to underlying ESRD.   -While NPO and awaiting further eval, I recommend maint IVF  -Avoid further renal insults, when possible: nephrotoxins, volume shifts, aim for BP within target.           Living-donor kidney transplant (sister) 1982    -Doing very well since transplant  -Cont IS and watch creat trend  -See CKD          Bilateral edema of lower extremity    -Hypoalbuminemia and immobility contributing factors.          Hypokalemia    -Corrected. Some being added to IVF (LR). Follow lab closely.           Hypomagnesemia    -Ordered IV replacement 4gr today  -Monitor and replace aggressively since had arrhythmia            Hypophosphatemia    -Was hypophosphatemic and expect will fall once starts nutrition from refeeding syndrome.  -Level today low--> replete IV after IV Mg and atb completed. Cont to monitor daily.        Prophylactic immunotherapy      -Cont prednisone/azathioprine  -Cylex ordered to be drawn Monday        Gastric carcinoma    -gen surgery suggesting neoadjuvant therapy with prehab prior to surgical intervention.          KTM will follow closely with you.    Marli Hernandez MD  Kidney Transplant  [facility]

## 2018-04-15 NOTE — ASSESSMENT & PLAN NOTE
-Was hypophosphatemic and expect will fall once starts nutrition from refeeding syndrome.  -Level today low--> replete IV after IV Mg and atb completed. Cont to monitor daily.

## 2018-04-15 NOTE — MEDICAL/APP STUDENT
Hospital Medicine  Progress note    I personally performed the history, physical exam and medical decision making: and confirmed the accuracy of the information in the transcribed note.    Team: [facility]OSP MED [address] [facility]  Admit Date: 4/10/2018  LIO 4/15/2018  Code status: Full Code    Principal Problem:  Atrial fibrillation    Interval hx:    4/11: H&P note  4/12 - Chaz: U/S endoscopic upper cancelled for cardiovascular complication, SR converted to ST after intubation with hypotension. Given phenylephrine 100mcg.   4/13: Talk to GI about EGD. General surgery states most likely will require neoadjuvant therapy prior to surgical intervention.    4/14: LLE erythema and warmth medially. Started on clindamycin 600mg q8hrs. Scheduled for EGD Monday. Consider feeding tube early next week, possibly starting TPN today.   4/15: EGD Monday. Placed PICC line overnight. Possibly start TPN after EGD, consider feeding tube next week.     ROS     Constitutional: Increased Fatigue  Pain Scale: 0 /10  Respiratory: no cough or shortness of breath  Cardiovascular: no chest pain or palpitations  Gastrointestinal: Nausea. No vomiting, no abdominal pain or change in bowel habits  Behavioral/Psych: no depression or anxiety      PEx  Temp:  [96 °F (35.6 °C)-98.5 °F (36.9 °C)]   Pulse:  [58-78]   Resp:  [15-18]   BP: (148-181)/(71-95)   SpO2:  [95 %-100 %]     Intake/Output Summary (Last 24 hours) at 04/15/18 1403  Last data filed at 04/15/18 0600   Gross per 24 hour   Intake               50 ml   Output              675 ml   Net             -625 ml       General Appearance: no acute distress   Heart: regular rate and rhythm  Respiratory: Normal respiratory effort, no crackles   Abdomen: Soft, non-tender; bowel sounds active  Skin: intact. IV sites ok.  LLE erythema and warmth medially  Neurologic:  No focal numbness or weakness  Mental status: Alert, oriented x 4, affect appropriate       Recent Labs  Lab 04/14/18  0636 04/14/18  1113  04/15/18  0543   WBC SEE COMMENT 4.85 4.02   HGB SEE COMMENT 10.3* 9.5*   HCT SEE COMMENT 29.4* 28.8*   PLT SEE COMMENT 190 173       Recent Labs  Lab 04/10/18  2005  04/12/18  1112 04/12/18 2029 04/13/18  0545 04/14/18  0636 04/15/18  0542 04/15/18  0543     < > 139 138 140 136  --  138   K 3.8  < > 3.3* 4.1 4.1 4.0  --  3.7   CL 98  < > 106 104 104 102  --  102   CO2 24  < > 22* 20* 22* 21*  --  25   BUN 14  < > 10 13 14 12  --  11   CREATININE 0.9  < > 0.6 0.7 0.6 0.7  --  0.7   *  < > 88 120* 97 87  --  104   CALCIUM 9.0  < > 7.9* 8.2* 8.3* 8.5*  --  8.4*   MG  --   < > 1.5* 1.6 1.4* 1.6 1.1*  --    PHOS  --   < > 2.6* 3.3  --   --   --  2.3*   LIPASE 43  --   --   --   --   --   --   --    AMYLASE 37  --   --   --   --   --   --   --    < > = values in this interval not displayed.    Recent Labs  Lab 04/10/18  2005   ALKPHOS 47*   ALT 16   AST 25   ALBUMIN 2.9*   PROT 6.4   BILITOT 1.5*   INR 1.1        Recent Labs  Lab 04/10/18  2357 04/12/18  2154 04/13/18  0741   POCTGLUCOSE 112* 113* 112*     No results for input(s): CPK, CPKMB, MB, TROPONINI in the last 72 hours.    Scheduled Meds:   azaTHIOprine  150 mg Oral Daily    clindamycin (CLEOCIN) IVPB  600 mg Intravenous Q8H    diltiaZEM  10 mg Intravenous Once    magnesium sulfate IVPB  2 g Intravenous Q2H    methylPREDNISolone sodium succinate  10 mg Intravenous Daily    pantoprazole 40 mg in dextrose 5 % 100 mL infusion (ready to mix system)  40 mg Intravenous BID    sodium phosphate IVPB  39.99 mmol Intravenous Once     Continuous Infusions:   dextrose 5% lactated ringers 50 mL/hr at 04/14/18 1702     As Needed:  acetaminophen, albuterol-ipratropium 2.5mg-0.5mg/3mL, bisacodyl, dextrose 50%, dextrose 50%, glucagon (human recombinant), glucose, glucose, hydrALAZINE, morphine, omnipaque, ondansetron, promethazine (PHENERGAN) IVPB, senna-docusate 8.6-50 mg, sodium chloride 0.9%    Active Hospital Problems    Diagnosis  POA    *Atrial  fibrillation [I48.91]  Unknown    Hypophosphatemia [E83.39]  Yes    Gastric carcinoma [C16.9]  Unknown    Weakness [R53.1]  Unknown    Esophageal spasm [K22.4]  Unknown    History of DVT (deep vein thrombosis) [Z86.718]  Not Applicable    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 May change pred to SM and hold  Aza for short period if unable to swallow.  -Follow with strict I/Os, daily weights, BP (goal <140/90), daily labs.    -Check immuknow cylex to better assess net IS.      Hypokalemia [E87.6]  Yes    Hypomagnesemia [E83.42]  Yes    Nonrheumatic aortic valve disorder [I35.9]  Unknown    Esophageal obstruction [K22.2]  Yes    Bilateral edema of lower extremity [R60.0]  Unknown    Dehydration [E86.0]  Unknown      Resolved Hospital Problems    Diagnosis Date Resolved POA   No resolved problems to display.       Overview: Mr. Elbert Connelly is a 67 y.o. male with medical problems including HTN, DVT/PE (IVC filter), renal disorder, and history of kidney transplant who presents with complaint of abdominal pain, fatigue, weakness, dizziness and esophageal obstruction.        Assessment and Plan for Problems addressed today:    Esophageal obstruction  Esophageal spasm  Gastric carcnoma  - AES consult for EGD/EUS for gastric cancer  - Surgical oncology consult and appreciate recs  - Will need documentation from OSH  - Unable to tolerate saliva but protecting airway.  - Strict NPO, aspiration precautions  - Not currently on anticoagulation- will need to be restarted on this admission  - LR mIVF x12 hours  - 4/11 - Maumus: NPO for procedure/surgery and aspiration concern, IV hydration and trend labs for electrolytes . CT thorax/abdomen/pelvis w/IV contrast for  staging  - EUS tomorrow pending CT results  - SLP consult for recs re: meds/diet   - Surg Onc consultation - paged 12 noon  - 4/12: U/S endoscopy upper cancelled for cardiovascular reasons  - 4/13: follow up with GI about EGD  - 4/14: Scheduled for EGD Monday. Consider feeding tube early next week, possibly starting TPN today.   - 4/15: EGD Monday. PICC line placed overnight. Consider starting TPN after EGD, and consider feeding tube early next week.     Cellulitis LLE  -LLE erythema and warmth medially. Started on clindamycin 600mg q8hrs on 4/13.     CKD Stage II (mild)  S/P kidney transplant (Stable)  - One functioning transplanted kidney  - KTM consult  - Avoid nephrotoxic agents, including contrast  - will defer abdominal imaging decision for cancer to KTM/surg-onc team  - Cr. 0.9, lytes WNL  - Mag/Phos   - Will change prednisone 10 mg PO daily to soludmedrol 10 mg IV daily while unable to tolerate PO  - Wife able to give patient imuran 150 mg PO daily crushed up in pudding at home.  No IV available, per pharmacy. Continue PO Imuran as long as patient can tolerate.  - 4/11 - maumus: Stable functioning kidney Cr .7 BUN 14, continue PO Imuran as long as tolerated.      Atrial Fibrillation  -Rate controlled  -Cardiology consulted: repeat ECHO when deemed appropriate,   -OSH records show pre-existing LBBB, no further evaluation needed.    History of DVT (deep vein thrombosis)  H/o DVT and PE  - Currently not on AC.  Was on warfarin for years, but switched to eliquis.  Eliquis recently stopped d/t inability to tolerate PO.  Will need to be restarted on eliquis after EGD/surgical intreventions.    Patient does not know dosing.   - IVC filter currently in place  - Currently in hypercoagulable state  - 4/11 - Maumus: Continue treatment with eliquis after intervention with swallowing difficulty.     Dehydration  -2/2 esophageal obstruction  - IVF replacement  - 4/11 - Maumus: Continue IV hydration     Chronic Bilateral  edema of lower extremity  - BNP 65 in setting of obesity  - will order 2D ECHO  - no erythema, swelling appears equal     Weakness  - PT/OT consult  - 4/11 - Nirmal: PT/OT consult when ready from intervention of esophageal spasm and gastric cancer     Hypomagnesemia  -1.3 (4/11)-->1.6 (4/13) monitor    Hypertension  - Stable  - Continue losartan 50 mg PO daily, cardizem 60 mg PO TID when able to tolerate PO    Hypokalemia(Resolved)    DVT PPx: enoxaparin    Discharge plan and follow up    Provider    I personally scribed for Dwight Ware MD on 04/15/2018 at 10:30 AM. Electronically signed by konstantin Eden on 04/15/2018 at 10:30 AM.

## 2018-04-15 NOTE — ASSESSMENT & PLAN NOTE
-Mod-Severely depleted.  -Ordered IV replacement 4gr today  -Monitor and replace aggressively since had arrhythmia

## 2018-04-15 NOTE — NURSING
Pt was standing to urinate and informed on pt abnormal rhythm. HR increased to 160s. Pt c/o of feeling bad. Stat EKG obtained. Vss. Paged md on call for IMB. Awaiting return call. Will report to pm nurse.

## 2018-04-15 NOTE — PLAN OF CARE
Problem: Patient Care Overview  Goal: Plan of Care Review  Pt remained free of injuries, falls, and trauma. Pt hypertensive at beginning of shift. Towards middle of shift, pt stated he feels better. BP started decreasing. Pt c/o abdominal pain. PRN pain medication administered. Pt did verbalized relief. Continuous D5W infusing at prescribed rate. Pt received scheduled antibiotic. Pt remained NPO.  No complaints or distress noted. Planned for EGD on Monday. Plan of care reviewed with pt. Pt verbalized understanding. Will continue to monitor.

## 2018-04-15 NOTE — ASSESSMENT & PLAN NOTE
-Back in sinus rhythm  -Hypomag and hypoK will potentially aggravate cardiac arrhythmias, and need close attention and aggressive repletion. Monitor K/Mg daily and replace IV prn

## 2018-04-15 NOTE — PROGRESS NOTES
Hospital Medicine  Progress note    I personally performed the history, physical exam and medical decision making: and confirmed the accuracy of the information in the transcribed note.    Team: [facility] [place] [facility] MED [address] Ware  Admit Date: 4/10/2018  LIO 4/15/2018  Code status: Full Code    Principal Problem:  Atrial fibrillation    Interval hx:    4/11: H&P note  4/12 - Chaz: U/S endoscopic upper cancelled for cardiovascular complication, SR converted to ST after intubation with hypotension. Given phenylephrine 100mcg.   4/13: Talk to GI about EGD. General surgery states most likely will require neoadjuvant therapy prior to surgical intervention.    4/14: LLE erythema and warmth medially. Started on clindamycin 600mg q8hrs. Scheduled for EGD Monday. Consider feeding tube early next week, possibly starting TPN today.   4/15: EGD Monday. Placed PICC line overnight. Possibly start TPN after EGD, consider feeding tube next week.     ROS     Constitutional: Increased Fatigue  Pain Scale: 0 /10  Respiratory: no cough or shortness of breath  Cardiovascular: no chest pain or palpitations  Gastrointestinal: Nausea. No vomiting, no abdominal pain or change in bowel habits  Behavioral/Psych: no depression or anxiety      PEx  Temp:  [96 °F (35.6 °C)-98.5 °F (36.9 °C)]   Pulse:  [58-78]   Resp:  [15-18]   BP: (148-181)/(71-95)   SpO2:  [95 %-100 %]     Intake/Output Summary (Last 24 hours) at 04/15/18 1403  Last data filed at 04/15/18 0600   Gross per 24 hour   Intake               50 ml   Output              675 ml   Net             -625 ml       General Appearance: no acute distress   Heart: regular rate and rhythm  Respiratory: Normal respiratory effort, no crackles   Abdomen: Soft, non-tender; bowel sounds active  Skin: intact. IV sites ok.  LLE erythema and warmth medially  Neurologic:  No focal numbness or weakness  Mental status: Alert, oriented x 4, affect appropriate       Recent Labs  Lab 04/14/18  0636 04/14/18  1113  04/15/18  0543   WBC SEE COMMENT 4.85 4.02   HGB SEE COMMENT 10.3* 9.5*   HCT SEE COMMENT 29.4* 28.8*   PLT SEE COMMENT 190 173       Recent Labs  Lab 04/10/18  2005  04/12/18  1112 04/12/18 2029 04/13/18  0545 04/14/18  0636 04/15/18  0542 04/15/18  0543     < > 139 138 140 136  --  138   K 3.8  < > 3.3* 4.1 4.1 4.0  --  3.7   CL 98  < > 106 104 104 102  --  102   CO2 24  < > 22* 20* 22* 21*  --  25   BUN 14  < > 10 13 14 12  --  11   CREATININE 0.9  < > 0.6 0.7 0.6 0.7  --  0.7   *  < > 88 120* 97 87  --  104   CALCIUM 9.0  < > 7.9* 8.2* 8.3* 8.5*  --  8.4*   MG  --   < > 1.5* 1.6 1.4* 1.6 1.1*  --    PHOS  --   < > 2.6* 3.3  --   --   --  2.3*   LIPASE 43  --   --   --   --   --   --   --    AMYLASE 37  --   --   --   --   --   --   --    < > = values in this interval not displayed.    Recent Labs  Lab 04/10/18  2005   ALKPHOS 47*   ALT 16   AST 25   ALBUMIN 2.9*   PROT 6.4   BILITOT 1.5*   INR 1.1        Recent Labs  Lab 04/10/18  2357 04/12/18  2154 04/13/18  0741   POCTGLUCOSE 112* 113* 112*     No results for input(s): CPK, CPKMB, MB, TROPONINI in the last 72 hours.    Scheduled Meds:   azaTHIOprine  150 mg Oral Daily    clindamycin (CLEOCIN) IVPB  600 mg Intravenous Q8H    diltiaZEM  10 mg Intravenous Once    magnesium sulfate IVPB  2 g Intravenous Q2H    methylPREDNISolone sodium succinate  10 mg Intravenous Daily    pantoprazole 40 mg in dextrose 5 % 100 mL infusion (ready to mix system)  40 mg Intravenous BID    sodium phosphate IVPB  39.99 mmol Intravenous Once     Continuous Infusions:   dextrose 5% lactated ringers 50 mL/hr at 04/14/18 1702     As Needed:  acetaminophen, albuterol-ipratropium 2.5mg-0.5mg/3mL, bisacodyl, dextrose 50%, dextrose 50%, glucagon (human recombinant), glucose, glucose, hydrALAZINE, morphine, omnipaque, ondansetron, promethazine (PHENERGAN) IVPB, senna-docusate 8.6-50 mg, sodium chloride 0.9%    Active Hospital Problems    Diagnosis  POA    *Atrial  fibrillation [I48.91]  Unknown    Hypophosphatemia [E83.39]  Yes    Gastric carcinoma [C16.9]  Unknown    Weakness [R53.1]  Unknown    Esophageal spasm [K22.4]  Unknown    History of DVT (deep vein thrombosis) [Z86.718]  Not Applicable    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 May change pred to SM and hold  Aza for short period if unable to swallow.  -Follow with strict I/Os, daily weights, BP (goal <140/90), daily labs.    -Check immuknow cylex to better assess net IS.      Hypokalemia [E87.6]  Yes    Hypomagnesemia [E83.42]  Yes    Nonrheumatic aortic valve disorder [I35.9]  Unknown    Esophageal obstruction [K22.2]  Yes    Bilateral edema of lower extremity [R60.0]  Unknown    Dehydration [E86.0]  Unknown      Resolved Hospital Problems    Diagnosis Date Resolved POA   No resolved problems to display.       Overview: Mr. Elbert Connelly is a 67 y.o. male with medical problems including HTN, DVT/PE (IVC filter), renal disorder, and history of kidney transplant who presents with complaint of abdominal pain, fatigue, weakness, dizziness and esophageal obstruction.        Assessment and Plan for Problems addressed today:    Esophageal obstruction  Esophageal spasm  Gastric carcnoma  - AES consult for EGD/EUS for gastric cancer  - Surgical oncology consult and appreciate recs  - Will need documentation from OSH  - Unable to tolerate saliva but protecting airway.  - Strict NPO, aspiration precautions  - Not currently on anticoagulation- will need to be restarted on this admission  - LR mIVF x12 hours  - 4/11 - Maumus: NPO for procedure/surgery and aspiration concern, IV hydration and trend labs for electrolytes . CT thorax/abdomen/pelvis w/IV contrast for  staging  - EUS tomorrow pending CT results  - SLP consult for recs re: meds/diet   - Surg Onc consultation - paged 12 noon  - 4/12: U/S endoscopy upper cancelled for cardiovascular reasons  - 4/13: follow up with GI about EGD  - 4/14: Scheduled for EGD Monday. Consider feeding tube early next week, possibly starting TPN today.   - 4/15: EGD Monday. PICC line placed overnight. Consider starting TPN after EGD, and consider feeding tube early next week.     Cellulitis LLE  -LLE erythema and warmth medially. Started on clindamycin 600mg q8hrs on 4/13.     CKD Stage II (mild)  S/P kidney transplant (Stable)  - One functioning transplanted kidney  - KTM consult  - Avoid nephrotoxic agents, including contrast  - will defer abdominal imaging decision for cancer to KTM/surg-onc team  - Cr. 0.9, lytes WNL  - Mag/Phos   - Will change prednisone 10 mg PO daily to soludmedrol 10 mg IV daily while unable to tolerate PO  - Wife able to give patient imuran 150 mg PO daily crushed up in pudding at home.  No IV available, per pharmacy. Continue PO Imuran as long as patient can tolerate.  - 4/11 - maumus: Stable functioning kidney Cr .7 BUN 14, continue PO Imuran as long as tolerated.      Atrial Fibrillation  -Rate controlled  -Cardiology consulted: repeat ECHO when deemed appropriate,   -OSH records show pre-existing LBBB, no further evaluation needed.    History of DVT (deep vein thrombosis)  H/o DVT and PE  - Currently not on AC.  Was on warfarin for years, but switched to eliquis.  Eliquis recently stopped d/t inability to tolerate PO.  Will need to be restarted on eliquis after EGD/surgical intreventions.    Patient does not know dosing.   - IVC filter currently in place  - Currently in hypercoagulable state  - 4/11 - Maumus: Continue treatment with eliquis after intervention with swallowing difficulty.     Dehydration  -2/2 esophageal obstruction  - IVF replacement  - 4/11 - Maumus: Continue IV hydration     Chronic Bilateral  edema of lower extremity  - BNP 65 in setting of obesity  - will order 2D ECHO  - no erythema, swelling appears equal     Weakness  - PT/OT consult  - 4/11 - Nirmal: PT/OT consult when ready from intervention of esophageal spasm and gastric cancer     Hypomagnesemia  -1.3 (4/11)-->1.6 (4/13) monitor    Hypertension  - Stable  - Continue losartan 50 mg PO daily, cardizem 60 mg PO TID when able to tolerate PO    Hypokalemia(Resolved)    DVT PPx: enoxaparin    Discharge plan and follow up    Provider    I personally scribed for Dwight Ware MD on 04/15/2018 at 10:30 AM. Electronically signed by konstantin Eden on 04/15/2018 at 10:30 AM.

## 2018-04-15 NOTE — MEDICAL/APP STUDENT
Hospital Medicine  Progress note    Team: [facility]OSP MED B Dr Ware  Admit Date: 4/10/2018  LIO 4/15/2018  Code status: Full Code    Principal Problem:  Atrial fibrillation    Interval hx:    4/11: H&P note  4/12 - Chaz: U/S endoscopic upper cancelled for cardiovascular complication, SR converted to ST after intubation with hypotension. Given phenylephrine 100mcg.   4/13: Talk to GI about EGD. General surgery states most likely will require neoadjuvant therapy prior to surgical intervention.    4/14: LLE erythema and warmth medially. Started on clindamycin 600mg q8hrs. Scheduled for EGD Monday. Consider feeding tube early next week, possibly starting TPN today.   4/15: EGD Monday. Placed PICC line overnight. Possibly start TPN after EGD, consider feeding tube next week.     ROS     Constitutional: Increased Fatigue  Pain Scale: 0 /10  Respiratory: no cough or shortness of breath  Cardiovascular: no chest pain or palpitations  Gastrointestinal: Nausea. No vomiting, no abdominal pain or change in bowel habits  Behavioral/Psych: no depression or anxiety      PEx  Temp:  [96 °F (35.6 °C)-98.5 °F (36.9 °C)]   Pulse:  [58-97]   Resp:  [15-18]   BP: (131-181)/(75-95)   SpO2:  [95 %-100 %]     Intake/Output Summary (Last 24 hours) at 04/15/18 0851  Last data filed at 04/15/18 0600   Gross per 24 hour   Intake              150 ml   Output              875 ml   Net             -725 ml       General Appearance: no acute distress   Heart: regular rate and rhythm  Respiratory: Normal respiratory effort, no crackles   Abdomen: Soft, non-tender; bowel sounds active  Skin: intact. IV sites ok.  LLE erythema and warmth medially  Neurologic:  No focal numbness or weakness  Mental status: Alert, oriented x 4, affect appropriate       Recent Labs  Lab 04/14/18  0636 04/14/18  1113 04/15/18  0543   WBC SEE COMMENT 4.85 4.02   HGB SEE COMMENT 10.3* 9.5*   HCT SEE COMMENT 29.4* 28.8*   PLT SEE COMMENT 190 173       Recent Labs  Lab  04/10/18  2005  04/11/18  0516  04/12/18  1112 04/12/18 2029 04/13/18  0545 04/14/18  0636 04/15/18  0542 04/15/18  0543     --  138  < > 139 138 140 136  --  138   K 3.8  --  2.9*  < > 3.3* 4.1 4.1 4.0  --  3.7   CL 98  --  101  < > 106 104 104 102  --  102   CO2 24  --  25  < > 22* 20* 22* 21*  --  25   BUN 14  --  14  < > 10 13 14 12  --  11   CREATININE 0.9  --  0.7  < > 0.6 0.7 0.6 0.7  --  0.7   *  --  97  < > 88 120* 97 87  --  104   CALCIUM 9.0  --  8.3*  < > 7.9* 8.2* 8.3* 8.5*  --  8.4*   MG  --   < > 1.3*  --  1.5* 1.6 1.4* 1.6 1.1*  --    PHOS  --   --  3.0  --  2.6* 3.3  --   --   --   --    LIPASE 43  --   --   --   --   --   --   --   --   --    AMYLASE 37  --   --   --   --   --   --   --   --   --    < > = values in this interval not displayed.    Recent Labs  Lab 04/10/18  2005   ALKPHOS 47*   ALT 16   AST 25   ALBUMIN 2.9*   PROT 6.4   BILITOT 1.5*   INR 1.1        Recent Labs  Lab 04/10/18  2357 04/12/18  2154 04/13/18  0741   POCTGLUCOSE 112* 113* 112*     Recent Labs      04/12/18   1112   CPK  10*   CPKMB  0.6   MB  6.0*   TROPONINI  <0.006       Scheduled Meds:   azaTHIOprine  150 mg Oral Daily    clindamycin (CLEOCIN) IVPB  600 mg Intravenous Q8H    diltiaZEM  10 mg Intravenous Once    methylPREDNISolone sodium succinate  10 mg Intravenous Daily    pantoprazole 40 mg in dextrose 5 % 100 mL infusion (ready to mix system)  40 mg Intravenous BID     Continuous Infusions:   dextrose 5% lactated ringers 50 mL/hr at 04/14/18 1702     As Needed:  acetaminophen, albuterol-ipratropium 2.5mg-0.5mg/3mL, bisacodyl, dextrose 50%, dextrose 50%, glucagon (human recombinant), glucose, glucose, hydrALAZINE, morphine, omnipaque, ondansetron, promethazine (PHENERGAN) IVPB, senna-docusate 8.6-50 mg, sodium chloride 0.9%    Active Hospital Problems    Diagnosis  POA    *Atrial fibrillation [I48.91]  Unknown    Gastric carcinoma [C16.9]  Unknown    Weakness [R53.1]  Unknown    Esophageal  spasm [K22.4]  Unknown    History of DVT (deep vein thrombosis) [Z86.718]  Not Applicable    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 May change pred to SM and hold  Aza for short period if unable to swallow.  -Follow with strict I/Os, daily weights, BP (goal <140/90), daily labs.    -Check immuknow cylex to better assess net IS.      Hypokalemia [E87.6]  Yes    Hypomagnesemia [E83.42]  Yes    Nonrheumatic aortic valve disorder [I35.9]  Unknown    Esophageal obstruction [K22.2]  Yes    Bilateral edema of lower extremity [R60.0]  Unknown    Dehydration [E86.0]  Unknown      Resolved Hospital Problems    Diagnosis Date Resolved POA   No resolved problems to display.       Overview: Mr. Elbert Connelly is a 67 y.o. male with medical problems including HTN, DVT/PE (IVC filter), renal disorder, and history of kidney transplant who presents with complaint of abdominal pain, fatigue, weakness, dizziness and esophageal obstruction.        Assessment and Plan for Problems addressed today:    Esophageal obstruction  Esophageal spasm  Gastric carcnoma  - AES consult for EGD/EUS for gastric cancer  - Surgical oncology consult and appreciate recs  - Will need documentation from OSH  - Unable to tolerate saliva but protecting airway.  - Strict NPO, aspiration precautions  - Not currently on anticoagulation- will need to be restarted on this admission  - LR mIVF x12 hours  - 4/11 - Maumus: NPO for procedure/surgery and aspiration concern, IV hydration and trend labs for electrolytes . CT thorax/abdomen/pelvis w/IV contrast for staging  - EUS tomorrow pending CT results  - SLP consult for recs re: meds/diet   - Surg Onc consultation - paged 12 noon  - 4/12: U/S endoscopy upper  cancelled for cardiovascular reasons  - 4/13: follow up with GI about EGD  - 4/14: Scheduled for EGD Monday. Consider feeding tube early next week, possibly starting TPN today.   - 4/15: EGD Monday. PICC line placed overnight. Consider starting TPN after EGD, and consider feeding tube early next week.     Cellulitis LLE  -LLE erythema and warmth medially. Started on clindamycin 600mg q8hrs on 4/13.     CKD Stage II (mild)  S/P kidney transplant (Stable)  - One functioning transplanted kidney  - KTM consult  - Avoid nephrotoxic agents, including contrast  - will defer abdominal imaging decision for cancer to KTM/surg-onc team  - Cr. 0.9, lytes WNL  - Mag/Phos   - Will change prednisone 10 mg PO daily to soludmedrol 10 mg IV daily while unable to tolerate PO  - Wife able to give patient imuran 150 mg PO daily crushed up in pudding at home.  No IV available, per pharmacy. Continue PO Imuran as long as patient can tolerate.  - 4/11 - maumus: Stable functioning kidney Cr .7 BUN 14, continue PO Imuran as long as tolerated.      Atrial Fibrillation  -Rate controlled  -Cardiology consulted: repeat ECHO when deemed appropriate,   -OSH records show pre-existing LBBB, no further evaluation needed.    History of DVT (deep vein thrombosis)  H/o DVT and PE  - Currently not on AC.  Was on warfarin for years, but switched to eliquis.  Eliquis recently stopped d/t inability to tolerate PO.  Will need to be restarted on eliquis after EGD/surgical intreventions.    Patient does not know dosing.   - IVC filter currently in place  - Currently in hypercoagulable state  - 4/11 - Maumus: Continue treatment with eliquis after intervention with swallowing difficulty.     Dehydration  -2/2 esophageal obstruction  - IVF replacement  - 4/11 - Maumus: Continue IV hydration     Chronic Bilateral edema of lower extremity  - BNP 65 in setting of obesity  - will order 2D ECHO  - no erythema, swelling appears equal     Weakness  - PT/OT consult  -  4/11 - Nirmal: PT/OT consult when ready from intervention of esophageal spasm and gastric cancer     Hypomagnesemia  -1.3 (4/11)-->1.6 (4/13) monitor    Hypertension  - Stable  - Continue losartan 50 mg PO daily, cardizem 60 mg PO TID when able to tolerate PO    Hypokalemia(Resolved)    DVT PPx: enoxaparin    Discharge plan and follow up    Provider    I personally scribed for Dwight Ware MD on 04/15/2018 at 10:30 AM. Electronically signed by konstantin Eden on 04/15/2018 at 10:30 AM.

## 2018-04-16 ENCOUNTER — ANESTHESIA (OUTPATIENT)
Dept: ENDOSCOPY | Facility: HOSPITAL | Age: 68
End: 2018-04-16

## 2018-04-16 PROBLEM — E46 PROTEIN-CALORIE MALNUTRITION: Status: ACTIVE | Noted: 2018-04-16

## 2018-04-16 LAB
ANION GAP SERPL CALC-SCNC: 13 MMOL/L
BASOPHILS # BLD AUTO: 0.01 K/UL
BASOPHILS NFR BLD: 0.3 %
BUN SERPL-MCNC: 5 MG/DL
CALCIUM SERPL-MCNC: 7.9 MG/DL
CHLORIDE SERPL-SCNC: 101 MMOL/L
CO2 SERPL-SCNC: 25 MMOL/L
CREAT SERPL-MCNC: 0.6 MG/DL
DIFFERENTIAL METHOD: ABNORMAL
EOSINOPHIL # BLD AUTO: 0.1 K/UL
EOSINOPHIL NFR BLD: 2 %
ERYTHROCYTE [DISTWIDTH] IN BLOOD BY AUTOMATED COUNT: 17.6 %
EST. GFR  (AFRICAN AMERICAN): >60 ML/MIN/1.73 M^2
EST. GFR  (NON AFRICAN AMERICAN): >60 ML/MIN/1.73 M^2
GLUCOSE SERPL-MCNC: 112 MG/DL
HCT VFR BLD AUTO: 29.8 %
HGB BLD-MCNC: 10.1 G/DL
IMM GRANULOCYTES # BLD AUTO: 0.03 K/UL
IMM GRANULOCYTES NFR BLD AUTO: 0.9 %
LYMPHOCYTES # BLD AUTO: 0.8 K/UL
LYMPHOCYTES NFR BLD: 24.1 %
MAGNESIUM SERPL-MCNC: 1.5 MG/DL
MCH RBC QN AUTO: 31.8 PG
MCHC RBC AUTO-ENTMCNC: 33.9 G/DL
MCV RBC AUTO: 94 FL
MONOCYTES # BLD AUTO: 0.1 K/UL
MONOCYTES NFR BLD: 3.4 %
NEUTROPHILS # BLD AUTO: 2.4 K/UL
NEUTROPHILS NFR BLD: 69.3 %
NRBC BLD-RTO: 0 /100 WBC
PHOSPHATE SERPL-MCNC: 3 MG/DL
PLATELET # BLD AUTO: 164 K/UL
PMV BLD AUTO: 9.5 FL
POTASSIUM SERPL-SCNC: 3.1 MMOL/L
RBC # BLD AUTO: 3.18 M/UL
SODIUM SERPL-SCNC: 139 MMOL/L
WBC # BLD AUTO: 3.48 K/UL

## 2018-04-16 PROCEDURE — 99233 SBSQ HOSP IP/OBS HIGH 50: CPT | Mod: ,,, | Performed by: INTERNAL MEDICINE

## 2018-04-16 PROCEDURE — 36415 COLL VENOUS BLD VENIPUNCTURE: CPT

## 2018-04-16 PROCEDURE — 20600001 HC STEP DOWN PRIVATE ROOM

## 2018-04-16 PROCEDURE — 99233 SBSQ HOSP IP/OBS HIGH 50: CPT | Mod: GC,,, | Performed by: INTERNAL MEDICINE

## 2018-04-16 PROCEDURE — 25000003 PHARM REV CODE 250: Performed by: PHYSICIAN ASSISTANT

## 2018-04-16 PROCEDURE — 76937 US GUIDE VASCULAR ACCESS: CPT

## 2018-04-16 PROCEDURE — C9113 INJ PANTOPRAZOLE SODIUM, VIA: HCPCS | Performed by: PHYSICIAN ASSISTANT

## 2018-04-16 PROCEDURE — A4216 STERILE WATER/SALINE, 10 ML: HCPCS | Performed by: INTERNAL MEDICINE

## 2018-04-16 PROCEDURE — 25000003 PHARM REV CODE 250: Performed by: INTERNAL MEDICINE

## 2018-04-16 PROCEDURE — S0077 INJECTION, CLINDAMYCIN PHOSP: HCPCS | Performed by: PHYSICIAN ASSISTANT

## 2018-04-16 PROCEDURE — 93005 ELECTROCARDIOGRAM TRACING: CPT

## 2018-04-16 PROCEDURE — 36569 INSJ PICC 5 YR+ W/O IMAGING: CPT

## 2018-04-16 PROCEDURE — 83735 ASSAY OF MAGNESIUM: CPT

## 2018-04-16 PROCEDURE — C1751 CATH, INF, PER/CENT/MIDLINE: HCPCS

## 2018-04-16 PROCEDURE — 63600175 PHARM REV CODE 636 W HCPCS: Performed by: INTERNAL MEDICINE

## 2018-04-16 PROCEDURE — 02HV33Z INSERTION OF INFUSION DEVICE INTO SUPERIOR VENA CAVA, PERCUTANEOUS APPROACH: ICD-10-PCS | Performed by: HOSPITALIST

## 2018-04-16 PROCEDURE — 84100 ASSAY OF PHOSPHORUS: CPT

## 2018-04-16 PROCEDURE — 80048 BASIC METABOLIC PNL TOTAL CA: CPT

## 2018-04-16 PROCEDURE — 63600175 PHARM REV CODE 636 W HCPCS: Performed by: PHYSICIAN ASSISTANT

## 2018-04-16 PROCEDURE — 85025 COMPLETE CBC W/AUTO DIFF WBC: CPT

## 2018-04-16 RX ORDER — SODIUM CHLORIDE 0.9 % (FLUSH) 0.9 %
10 SYRINGE (ML) INJECTION
Status: DISCONTINUED | OUTPATIENT
Start: 2018-04-16 | End: 2018-04-19 | Stop reason: HOSPADM

## 2018-04-16 RX ORDER — POTASSIUM CHLORIDE 7.45 MG/ML
10 INJECTION INTRAVENOUS ONCE
Status: COMPLETED | OUTPATIENT
Start: 2018-04-16 | End: 2018-04-16

## 2018-04-16 RX ORDER — SODIUM CHLORIDE 0.9 % (FLUSH) 0.9 %
10 SYRINGE (ML) INJECTION EVERY 6 HOURS
Status: DISCONTINUED | OUTPATIENT
Start: 2018-04-16 | End: 2018-04-19 | Stop reason: HOSPADM

## 2018-04-16 RX ADMIN — Medication 10 ML: at 12:04

## 2018-04-16 RX ADMIN — POTASSIUM CHLORIDE 10 MEQ: 7.46 INJECTION, SOLUTION INTRAVENOUS at 12:04

## 2018-04-16 RX ADMIN — POTASSIUM CHLORIDE 10 MEQ: 7.46 INJECTION, SOLUTION INTRAVENOUS at 01:04

## 2018-04-16 RX ADMIN — Medication 10 ML: at 06:04

## 2018-04-16 RX ADMIN — DEXTROSE, SODIUM CHLORIDE, SODIUM LACTATE, POTASSIUM CHLORIDE, AND CALCIUM CHLORIDE: 5; .6; .31; .03; .02 INJECTION, SOLUTION INTRAVENOUS at 05:04

## 2018-04-16 RX ADMIN — DEXTROSE, SODIUM CHLORIDE, SODIUM LACTATE, POTASSIUM CHLORIDE, AND CALCIUM CHLORIDE: 5; .6; .31; .03; .02 INJECTION, SOLUTION INTRAVENOUS at 02:04

## 2018-04-16 RX ADMIN — AZATHIOPRINE 150 MG: 50 TABLET ORAL at 09:04

## 2018-04-16 RX ADMIN — CLINDAMYCIN IN 5 PERCENT DEXTROSE 600 MG: 12 INJECTION, SOLUTION INTRAVENOUS at 02:04

## 2018-04-16 RX ADMIN — MAGNESIUM SULFATE IN WATER 2 G: 40 INJECTION, SOLUTION INTRAVENOUS at 01:04

## 2018-04-16 RX ADMIN — CLINDAMYCIN IN 5 PERCENT DEXTROSE 600 MG: 12 INJECTION, SOLUTION INTRAVENOUS at 05:04

## 2018-04-16 RX ADMIN — ONDANSETRON 4 MG: 4 TABLET, ORALLY DISINTEGRATING ORAL at 10:04

## 2018-04-16 RX ADMIN — ASCORBIC ACID, VITAMIN A PALMITATE, CHOLECALCIFEROL, THIAMINE HYDROCHLORIDE, RIBOFLAVIN-5 PHOSPHATE SODIUM, PYRIDOXINE HYDROCHLORIDE, NIACINAMIDE, DEXPANTHENOL, ALPHA-TOCOPHEROL ACETATE, VITAMIN K1, FOLIC ACID, BIOTIN, CYANOCOBALAMIN: 200; 3300; 200; 6; 3.6; 6; 40; 15; 10; 150; 600; 60; 5 INJECTION, SOLUTION INTRAVENOUS at 07:04

## 2018-04-16 RX ADMIN — PROMETHAZINE HYDROCHLORIDE 6.25 MG: 25 INJECTION INTRAMUSCULAR; INTRAVENOUS at 09:04

## 2018-04-16 RX ADMIN — DEXTROSE 40 MG: 50 INJECTION, SOLUTION INTRAVENOUS at 09:04

## 2018-04-16 RX ADMIN — CLINDAMYCIN IN 5 PERCENT DEXTROSE 600 MG: 12 INJECTION, SOLUTION INTRAVENOUS at 10:04

## 2018-04-16 RX ADMIN — METHYLPREDNISOLONE SODIUM SUCCINATE 10 MG: 40 INJECTION, POWDER, FOR SOLUTION INTRAMUSCULAR; INTRAVENOUS at 09:04

## 2018-04-16 RX ADMIN — CLINDAMYCIN IN 5 PERCENT DEXTROSE 600 MG: 12 INJECTION, SOLUTION INTRAVENOUS at 12:04

## 2018-04-16 NOTE — NURSING
Pt hr remains elevated in 120s-130s. Asymptomatic w/o any complaints. Will hold off on administering any meds per Dr. Escamilla. Parameter given, if pt hr increases to 150. Notify md. Reported to pm nurse.

## 2018-04-16 NOTE — CARE UPDATE
Rapid Response Follow-up Note    Followed up with patient for proactive rounding.   No acute issues at this time. Vital signs within normal limits  Reviewed plan of care with primary RN Celena. No additional concerns.  Please call Rapid Response RN with any questions or concerns at  [phone]

## 2018-04-16 NOTE — PLAN OF CARE
Problem: Patient Care Overview  Goal: Plan of Care Review    Recommendations     Recommendation/Intervention:   Pt at high risk for refeeding syndrome due to minimal PO intake and significant weight loss x 4 months.     1. If to begin TPN, recommend beginning at a low rate with gradual increase after refeeding labs are WNL for 48 hours.     -Initiate TPN with Clinimix 5/20 at a goal rate of 20 ml/hr. Monitor K, Mg, Phos closely.  -If labs WNL x 48 hours, advance rate to Clinimix 5/20 at at 40 ml/hr.  -If labs stable over next 48 hours, advance to Clinimix 5/20 at 60 ml/hr with IV lipids.  -If labs stable, advance to final goal rate of 90 ml/hr with IV lipids to meet 100% EEN/EPN. This will provide 2401 kcal, 108 gm pro, and 2160 ml fluid (GIR=3.37).     2. Consider prophylactic supplementation of K, Mg, Phos with thiamine.     3. When able to advance to TF and refeeding labs WNL, recommend Isosource at a goal rate of 65 ml/hr to provide 2340 kcal, 106 gm pro, and 1192 ml free water. Additional free water per MD.     4. RD following.     Goals: Slow advancement of nutrition support with K, Phos, Mg, WNL  Nutrition Goal Status: new

## 2018-04-16 NOTE — PT/OT/SLP PROGRESS
Physical Therapy      Patient Name:  Elbert Connelly   MRN:  [id number]    Patient not seen today secondary to  (pt refused stating that he had a proceedure later today. ). Will follow-up at a later date..    Kayleen Mina, PT

## 2018-04-16 NOTE — PLAN OF CARE
Problem: Patient Care Overview  Goal: Plan of Care Review  Pt remained free of injuries, falls, and trauma. Pt in AFib w/ RVR at the beginning of shift. -140s. Vital signs remained stable throughout episode. Pt was asymptomatic. Metoprolol given x  3 doses. Pt HR dropped to 110s. Pt received 1x dose of Digoxin. Pt converted to SR. Electrolytes Potassium, Phosphorus, and Magnesium replaced. Pt received 500 cc bolus of Lactate Ringers. Endoscopic ultrasound planned for in the AM. Plan of care reviewed with pt. Pt verbalized understanding. Will continue to monitor.

## 2018-04-16 NOTE — SUBJECTIVE & OBJECTIVE
"  Subjective:   History of Present Illness:  Elbert is a 68 yo WM s/p living donor kidney transplant from sister in 1982 ([place]. [place] [facility] [facility]) admitted for N/V and newly diagnosed gastric cancer for multidisciplinary GI and surgical oncology eval.  He reports no issues of rejection or other txp complications. He has been maintained on prednisone 10 mg daily and azathioprine 150 mg daily. (He reports taking cyclosporine for only a short period after txp).  He states he has been able to keep his IS down by crushing meds in applesauce, but cannot eat much; wife states liquids stay down for a few minutes before he vomits it back up. From kidney standpoint, he reports no past  issues except remote UTI "years ago," but he started with urinary incontinence in very recent past. He denies pain over allograft, frequency or urgency.    He states his current illness began in January when he started with vomiting. He states he has lost 60#s since then, which he attributes to difficulty tolerating PO intake. He notes he has been in an out of the hospital multiple times over the past 3 months for similar issue.  Last week he states and EGD w/ biopsies showed stomach cancer.        Hospital Course:  No notes on file    Interval History:  -afib RVR overnight; dig started  -pt has no real complaints but wants to get his nutrition up    Past Medical, Surgical, Family, and Social History:   Unchanged from H&P.    Scheduled Meds:   azaTHIOprine  150 mg Oral Daily    clindamycin (CLEOCIN) IVPB  600 mg Intravenous Q8H    methylPREDNISolone sodium succinate  10 mg Intravenous Daily    pantoprazole 40 mg in dextrose 5 % 100 mL infusion (ready to mix system)  40 mg Intravenous BID     Continuous Infusions:   dextrose 5% lactated ringers 75 mL/hr at 04/16/18 0554     PRN Meds:acetaminophen, albuterol-ipratropium 2.5mg-0.5mg/3mL, bisacodyl, dextrose 50%, dextrose 50%, glucagon (human recombinant), glucose, glucose, " "hydrALAZINE, metoprolol, morphine, omnipaque, ondansetron, promethazine (PHENERGAN) IVPB, senna-docusate 8.6-50 mg, sodium chloride 0.9%    Intake/Output - Last 3 Shifts       04/14 0700 - 04/15 0659 04/15 0700 - 04/16 0659 04/16 0700 - 04/17 0659    P.O. 0 0     I.V. (mL/kg) 100 (1.1) 150 (1.7)     IV Piggyback 50 50 50    Total Intake(mL/kg) 150 (1.7) 200 (2.3) 50 (0.6)    Urine (mL/kg/hr) 875 (0.4) 1800 (0.8) 225 (1.3)    Stool 0 (0) 0 (0)     Total Output 875 1800 225    Net -725 -1600 -175           Stool Occurrence 0 x 0 x            Review of Systems   Objective:     Vital Signs (Most Recent):  Temp: 96.2 °F (35.7 °C) (04/16/18 0445)  Pulse: (!) 57 (04/16/18 0700)  Resp: 18 (04/16/18 0445)  BP: (!) 157/82 (04/16/18 0445)  SpO2: 95 % (04/16/18 0445) Vital Signs (24h Range):  Temp:  [96.2 °F (35.7 °C)-98.3 °F (36.8 °C)] 96.2 °F (35.7 °C)  Pulse:  [] 57  Resp:  [] 18  SpO2:  [93 %-98 %] 95 %  BP: ()/() 157/82     Weight: 88.8 kg (195 lb 12.3 oz)  Height: 5' 7" (170.2 cm)  Body mass index is 30.66 kg/m².    Physical Exam  GEN - AAOx4, NAd  CHEST  -CTA B anterior auscultation  ABD - soft; nonttp  EXTR  -warm; 2+ pitting edema to thighs  Laboratory:  Component      Latest Ref Rng & Units 4/16/2018   Sodium      136 - 145 mmol/L 139   Potassium      3.5 - 5.1 mmol/L 3.1 (L)   Chloride      95 - 110 mmol/L 101   CO2      23 - 29 mmol/L 25   Glucose      70 - 110 mg/dL 112 (H)   BUN, Bld      8 - 23 mg/dL 5 (L)   Creatinine      0.5 - 1.4 mg/dL 0.6   Calcium      8.7 - 10.5 mg/dL 7.9 (L)   Anion Gap      8 - 16 mmol/L 13   eGFR if African American      >60 mL/min/1.73 m:2 >60.0   eGFR if non African American      >60 mL/min/1.73 m:2 >60.0     "

## 2018-04-16 NOTE — NURSING
Pt hr is still elevated ranging from 130s-140s. Remains asymptomatic w/o any complaints. Informed Dr. Simons for IMB. Further orders placed. Will report to pm nurse.

## 2018-04-16 NOTE — CONSULTS
Double lumen PICC placed in right brachial vein of TALA, 41cm in length with 0cm exposed and 35cm arm circumference. Lot#[id number].

## 2018-04-16 NOTE — ASSESSMENT & PLAN NOTE
-stable  -CKD2 s/p kidney transplantation  remains stable Continue to monitor renal function and electrolytes, HTN, secondary hyperparathyroidism and other issues related to underlying ESRD.   -While NPO and awaiting further eval, I recommend maint IVF  -Avoid further renal insults, when possible: nephrotoxins, volume shifts, aim for BP within target.

## 2018-04-16 NOTE — PROCEDURES
"Elbert Connelly is a 67 y.o. male patient.    Temp: 96.2 °F (35.7 °C) (04/16/18 0445)  Pulse: 63 (04/16/18 0938)  Resp: 18 (04/16/18 0938)  BP: 119/70 (04/16/18 0938)  SpO2: 99 % (04/16/18 0938)  Weight: 88.8 kg (195 lb 12.3 oz) (04/11/18 1601)  Height: 5' 7" (170.2 cm) (04/11/18 1601)    PICC  Date/Time: 4/16/2018 11:14 AM  Performed by: COLIN MENDIOLA  Consent Done: Yes  Time out: Immediately prior to procedure a time out was called to verify the correct patient, procedure, equipment, support staff and site/side marked as required  Indications: med administration and vascular access  Anesthesia: local infiltration  Local anesthetic: lidocaine 1% without epinephrine  Anesthetic Total (mL): 2  Preparation: skin prepped with ChloraPrep  Skin prep agent dried: skin prep agent completely dried prior to procedure  Sterile barriers: all five maximum sterile barriers used - cap, mask, sterile gown, sterile gloves, and large sterile sheet  Hand hygiene: hand hygiene performed prior to central venous catheter insertion  Location details: right brachial  Catheter type: double lumen  Catheter size: 5 Fr  Catheter Length: 41cm    Ultrasound guidance: yes  Vessel Caliber: medium and patent, compressibility normal  Vascular Doppler: not done  Needle advanced into vessel with real time Ultrasound guidance.  Guidewire confirmed in vessel.  Image recorded and saved.  Sterile sheath used.  Number of attempts: 1  Post-procedure: blood return through all ports, chlorhexidine patch and sterile dressing applied  Technical procedures used: 3cg  Specimens: No  Implants: No  Assessment: placement verified by x-ray  Complications: none        Funmi Trevizo  4/16/2018    "

## 2018-04-16 NOTE — PROGRESS NOTES
Hospital Medicine  Progress note    I personally performed the history, physical exam and medical decision making: and confirmed the accuracy of the information in the transcribed note.    Team: [facility] [place] [facility] Ware  Admit Date: 4/10/2018  LIO 4/18/2018  Code status: Full Code    Principal Problem:  Atrial fibrillation    Interval hx:    4/11: H&P note  4/12 - Chaz: U/S endoscopic upper cancelled for cardiovascular complication, SR converted to ST after intubation with hypotension. Given phenylephrine 100mcg.   4/13: Talk to GI about EGD. General surgery states most likely will require neoadjuvant therapy prior to surgical intervention.    4/14: LLE erythema and warmth medially. Started on clindamycin 600mg q8hrs. Scheduled for EGD Monday. Consider feeding tube early next week, possibly starting TPN today.   4/15: EGD Monday. Placed PICC line order overnight. Possibly start TPN after EGD, consider feeding tube next week.   4/16: Had a run of Afib with RVR last night. Controlled with lopressor and one dose digoxin. EGD held until patient more hemodynamically stable. PICC line placement today to start TPN. Replaced K and Mg.    ROS     Constitutional: Increased Fatigue  Pain Scale: 0 /10  Respiratory: no cough or shortness of breath  Cardiovascular: no chest pain or palpitations  Gastrointestinal: Nausea. No vomiting, no abdominal pain or change in bowel habits  Behavioral/Psych: no depression or anxiety      PEx  Temp:  [96.2 °F (35.7 °C)-98.3 °F (36.8 °C)]   Pulse:  []   Resp:  []   BP: ()/()   SpO2:  [93 %-99 %]     Intake/Output Summary (Last 24 hours) at 04/16/18 1700  Last data filed at 04/16/18 1600   Gross per 24 hour   Intake          2743.75 ml   Output             2200 ml   Net           543.75 ml       General Appearance: no acute distress   Heart: regular rate and rhythm  Respiratory: Normal respiratory effort, no crackles   Abdomen: Soft, non-tender; bowel sounds  active  Skin: intact. IV sites ok.  LLE erythema and warmth medially  Neurologic:  No focal numbness or weakness  Mental status: Alert, oriented x 4, affect appropriate       Recent Labs  Lab 04/14/18  1113 04/15/18  0543 04/16/18  0552   WBC 4.85 4.02 3.48*   HGB 10.3* 9.5* 10.1*   HCT 29.4* 28.8* 29.8*    173 164       Recent Labs  Lab 04/10/18  2005  04/15/18  0542 04/15/18  0543 04/15/18  2042 04/16/18  0552     < >  --  138 138 139   K 3.8  < >  --  3.7 3.8 3.1*   CL 98  < >  --  102 100 101   CO2 24  < >  --  25 29 25   BUN 14  < >  --  11 7* 5*   CREATININE 0.9  < >  --  0.7 0.7 0.6   *  < >  --  104 125* 112*   CALCIUM 9.0  < >  --  8.4* 8.4* 7.9*   MG  --   < > 1.1*  --  1.6 1.5*   PHOS  --   < >  --  2.3* 3.3 3.0   LIPASE 43  --   --   --   --   --    AMYLASE 37  --   --   --   --   --    < > = values in this interval not displayed.    Recent Labs  Lab 04/10/18  2005   ALKPHOS 47*   ALT 16   AST 25   ALBUMIN 2.9*   PROT 6.4   BILITOT 1.5*   INR 1.1        Recent Labs  Lab 04/10/18  2357 04/12/18  2154 04/13/18  0741 04/15/18  1900   POCTGLUCOSE 112* 113* 112* 118*     No results for input(s): CPK, CPKMB, MB, TROPONINI in the last 72 hours.    Scheduled Meds:   azaTHIOprine  150 mg Oral Daily    clindamycin (CLEOCIN) IVPB  600 mg Intravenous Q8H    methylPREDNISolone sodium succinate  10 mg Intravenous Daily    pantoprazole 40 mg in dextrose 5 % 100 mL infusion (ready to mix system)  40 mg Intravenous BID    sodium chloride 0.9%  10 mL Intravenous Q6H     Continuous Infusions:   dextrose 5% lactated ringers 75 mL/hr at 04/16/18 1405     As Needed:  acetaminophen, albuterol-ipratropium 2.5mg-0.5mg/3mL, bisacodyl, dextrose 50%, dextrose 50%, glucagon (human recombinant), glucose, glucose, hydrALAZINE, metoprolol, morphine, omnipaque, ondansetron, promethazine (PHENERGAN) IVPB, senna-docusate 8.6-50 mg, Flushing PICC Protocol **AND** sodium chloride 0.9% **AND** sodium chloride 0.9%,  sodium chloride 0.9%    Active Hospital Problems    Diagnosis  POA    *Atrial fibrillation [I48.91]  Unknown    Protein-calorie malnutrition [E46]  Unknown    Hypophosphatemia [E83.39]  Yes    Gastric carcinoma [C16.9]  Unknown    Weakness [R53.1]  Unknown    Esophageal spasm [K22.4]  Unknown    History of DVT (deep vein thrombosis) [Z86.718]  Not Applicable    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 May change pred to SM and hold  Aza for short period if unable to swallow.  -Follow with strict I/Os, daily weights, BP (goal <140/90), daily labs.    -Check immuknow cylex to better assess net IS.      Hypokalemia [E87.6]  Yes    Hypomagnesemia [E83.42]  Yes    Nonrheumatic aortic valve disorder [I35.9]  Unknown    Esophageal obstruction [K22.2]  Yes    Bilateral edema of lower extremity [R60.0]  Unknown    Dehydration [E86.0]  Unknown      Resolved Hospital Problems    Diagnosis Date Resolved POA   No resolved problems to display.       Overview: Mr. Elbert Connelly is a 67 y.o. male with medical problems including HTN, DVT/PE (IVC filter), renal disorder, and history of kidney transplant who presents with complaint of abdominal pain, fatigue, weakness, dizziness and esophageal obstruction.        Assessment and Plan for Problems addressed today:    Esophageal obstruction  Esophageal spasm  Gastric carcnoma  - AES consult for EGD/EUS for gastric cancer  - Surgical oncology consult and appreciate recs  - Will need documentation from OSH  - Unable to tolerate saliva but protecting airway.  - Strict NPO, aspiration precautions  - Not currently on anticoagulation- will need to be restarted on this admission  - LR mIVF x12 hours  - 4/11 - Nirmal: NPO for  procedure/surgery and aspiration concern, IV hydration and trend labs for electrolytes . CT thorax/abdomen/pelvis w/IV contrast for staging  - EUS tomorrow pending CT results  - SLP consult for recs re: meds/diet   - Surg Onc consultation - paged 12 noon  - 4/12: U/S endoscopy upper cancelled for cardiovascular reasons  - 4/13: follow up with GI about EGD  - 4/14: Scheduled for EGD Monday. Consider feeding tube early next week, possibly starting TPN today.   - 4/15: EGD Monday. PICC line order placed overnight. Consider starting TPN after EGD, and consider feeding tube early next week.   -4/16: EGD held due to afib with RVR overnight. PICC line placement today to start TPN.    Cellulitis LLE  -LLE erythema and warmth medially. Started on clindamycin 600mg q8hrs on 4/13.     CKD Stage II (mild)  S/P kidney transplant (Stable)  - One functioning transplanted kidney  - KTM consult  - Avoid nephrotoxic agents, including contrast  - will defer abdominal imaging decision for cancer to KTM/surg-onc team  - Cr. 0.9, lytes WNL  - Mag/Phos   - Will change prednisone 10 mg PO daily to soludmedrol 10 mg IV daily while unable to tolerate PO  - Wife able to give patient imuran 150 mg PO daily crushed up in pudding at home.  No IV available, per pharmacy. Continue PO Imuran as long as patient can tolerate.  - 4/11 - maumus: Stable functioning kidney Cr .7 BUN 14, continue PO Imuran as long as tolerated.      Atrial Fibrillation  -Rate controlled  -Cardiology consulted: repeat ECHO when deemed appropriate,   -OSH records show pre-existing LBBB, no further evaluation needed.  -4/16: run of afib with RVR overnight, controlled with lopressor and one dose digoxin.    History of DVT (deep vein thrombosis)  H/o DVT and PE  - Currently not on AC.  Was on warfarin for years, but switched to eliquis.  Eliquis recently stopped d/t inability to tolerate PO.  Will need to be restarted on eliquis after EGD/surgical intreventions.    Patient  does not know dosing.   - IVC filter currently in place  - Currently in hypercoagulable state  - 4/11 - Maumus: Continue treatment with eliquis after intervention with swallowing difficulty.     Dehydration  -2/2 esophageal obstruction  - IVF replacement  - 4/11 - Maumus: Continue IV hydration     Chronic Bilateral edema of lower extremity  - BNP 65 in setting of obesity  - will order 2D ECHO  - no erythema, swelling appears equal     Weakness  - PT/OT consult  - 4/11 - Maumus: PT/OT consult when ready from intervention of esophageal spasm and gastric cancer     Hypomagnesemia  -1.3 (4/11)-->1.6 (4/13) monitor  -4/16 Mg 1.5-replaced    Hypertension  - Stable  - Continue losartan 50 mg PO daily, cardizem 60 mg PO TID when able to tolerate PO    Hypokalemia  -4/16 K-3.1-replaced    DVT PPx: enoxaparin    Discharge plan and follow up    Provider    I personally scribed for Dwight Ware MD on 04/16/2018 at 10:43 AM. Electronically signed by konstantin Eden on 04/16/2018 at 10:43 AM.

## 2018-04-16 NOTE — PROGRESS NOTES
[facility]  Adult Nutrition  Progress Note    SUMMARY     Recommendations    Recommendation/Intervention:   Pt at high risk for refeeding syndrome due to minimal PO intake and significant weight loss x 4 months.    1. If to begin TPN, recommend beginning at a low rate with gradual increase after refeeding labs are WNL for 48 hours.    -Initiate TPN with Clinimix 5/20 at a goal rate of 20 ml/hr. Monitor K, Mg, Phos closely.  -If labs WNL x 48 hours, advance rate to Clinimix 5/20 at at 40 ml/hr.  -If labs stable over next 48 hours, advance to Clinimix 5/20 at 60 ml/hr with IV lipids.  -If labs stable, advance to final goal rate of 90 ml/hr with IV lipids to meet 100% EEN/EPN. This will provide 2401 kcal, 108 gm pro, and 2160 ml fluid (GIR=3.37).    2. Consider prophylactic supplementation of K, Mg, Phos with thiamine.    3. When able to advance to TF and refeeding labs WNL, recommend Isosource at a goal rate of 65 ml/hr to provide 2340 kcal, 106 gm pro, and 1192 ml free water. Additional free water per MD.    4. RD following.    Goals: Slow advancement of nutrition support with K, Phos, Mg, WNL  Nutrition Goal Status: new  Communication of RD Recs:  (POC)    Reason for Assessment    Reason for Assessment: NPO/clear liquids x 5 days  Diagnosis: gastrointestinal disease (esophageal obstruction)  Relevant Medical History: Gastric ca, HTN, s/p bilateral OKTx (1982)    General Information Comments: Pt reports chronic vomiting with minimal PO intake since January. Wt loss of ~100 lb per pt's stated UBW. Pt reports PICC has been placed. Possible TPN to start with TF later this week per MD note. Pt at high risk for refeeding syndrome.    Nutrition Discharge Planning: Unable to determine at this time    Nutrition Risk Screen    Nutrition Risk Screen: dysphagia or difficulty swallowing    Nutrition/Diet History    Typical Food/Fluid Intake: Emesis with PO intake x 4 months  Do you have any cultural,  "spiritual, Hinduism conflicts, given your current situation?: none reported   Factors Affecting Nutritional Intake: altered gastrointestinal function, difficulty/impaired swallowing, NPO    Anthropometrics    Temp: 98.1 °F (36.7 °C)  Height Method: Stated  Height: 5' 7" (170.2 cm)  Height (inches): 67 in  Weight Method: Bed Scale  Weight: 88.8 kg (195 lb 12.3 oz)  Weight (lb): 195.77 lb  Ideal Body Weight (IBW), Male: 148 lb  % Ideal Body Weight, Male (lb): 132.28 lb  BMI (Calculated): 30.7  BMI Grade: 30 - 34.9- obesity - grade I  Usual Body Weight (UBW), k.6 kg  % Usual Body Weight: 66.61  % Weight Change From Usual Weight: -33.53 %       Lab/Procedures/Meds    Pertinent Labs Reviewed: reviewed  Pertinent Labs Comments: K 3.1, Glu 112, Mg 1.5  Pertinent Medications Reviewed: reviewed  Pertinent Medications Comments: Lactated Ringer's in D5W, Mg, methylprednisolone, pantoprazole, KCl, Na phosphate    Physical Findings/Assessment    Overall Physical Appearance: overweight  Oral/Mouth Cavity: WDL  Skin: intact    Estimated/Assessed Needs    Weight Used For Calorie Calculations: 88.8 kg (195 lb 12.3 oz)  Energy Calorie Requirements (kcal): 4390-6575  Energy Need Method: Kcal/kg (25-30)  Protein Requirements:  gm (1.0-1.2 gm/kg)  Weight Used For Protein Calculations: 88.8 kg (195 lb 12.3 oz)     Fluid Need Method: RDA Method (1 ml/kcal or per MD)  RDA Method (mL): 2220       Nutrition Prescription Ordered    Current Diet Order: NPO    Evaluation of Received Nutrient/Fluid Intake    IV Fluid (mL): 500  I/O: -1.6L  Comments: LBM 4/5  % Intake of Estimated Energy Needs: 0 - 25 %  % Meal Intake: NPO    Nutrition Risk    Level of Risk/Frequency of Follow-up:  (F/u 2x weekly)     Assessment and Plan    Protein-calorie malnutrition    Malnutrition in the context of Chronic Illness/Injury    Related to (etiology):  Altered GI function    Signs and Symptoms (as evidenced by):  Energy Intake: <50% of estimated " energy requirement for 4 months  Weight Loss: 33% x 4 months     Interventions/Recommendations (treatment strategy):  See recs.    Nutrition Diagnosis Status:  New           Monitor and Evaluation    Food and Nutrient Intake: energy intake, parenteral nutrition intake, enteral nutrition intake  Food and Nutrient Adminstration: diet order, enteral and parenteral nutrition administration  Anthropometric Measurements: weight, weight change, body mass index  Biochemical Data, Medical Tests and Procedures: electrolyte and renal panel, gastrointestinal profile, glucose/endocrine profile, inflammatory profile  Nutrition-Focused Physical Findings: overall appearance     Nutrition Follow-Up    RD Follow-up?: Yes

## 2018-04-16 NOTE — PLAN OF CARE
Problem: Patient Care Overview  Goal: Plan of Care Review  Outcome: Revised  Pt free of falls/trauma/injuries.  Denies c/o SOB, CP, or discomfort.  Generalized skin remains CDI; 2-3+ edema noted to BLE. Pt being treated w/Clindamycin q8h for LLE cellulitis. MALU PICC placed today for TPN. Will initiate TPN when available. D5 LR infusing at 75cc/hr. SLP following; Plan for repeat EGD. Pt being treated with Protonix IVPB BID. Pt tolerating plan of care.

## 2018-04-16 NOTE — MEDICAL/APP STUDENT
Hospital Medicine  Progress note    Team: [facility] [place] [facility] [address] Dr Ware  Admit Date: 4/10/2018  LIO 4/18/2018  Code status: Full Code    Principal Problem:  Atrial fibrillation    Interval hx:    4/11: H&P note  4/12 - Chaz: U/S endoscopic upper cancelled for cardiovascular complication, SR converted to ST after intubation with hypotension. Given phenylephrine 100mcg.   4/13: Talk to GI about EGD. General surgery states most likely will require neoadjuvant therapy prior to surgical intervention.    4/14: LLE erythema and warmth medially. Started on clindamycin 600mg q8hrs. Scheduled for EGD Monday. Consider feeding tube early next week, possibly starting TPN today.   4/15: EGD Monday. Placed PICC line order overnight. Possibly start TPN after EGD, consider feeding tube next week.   4/16: Had a run of Afib with RVR last night. Controlled with lopressor and one dose digoxin. EGD held until patient more hemodynamically stable. PICC line placement today to start TPN. Replaced K and Mg.    ROS     Constitutional: Increased Fatigue  Pain Scale: 0 /10  Respiratory: no cough or shortness of breath  Cardiovascular: no chest pain or palpitations  Gastrointestinal: Nausea. No vomiting, no abdominal pain or change in bowel habits  Behavioral/Psych: no depression or anxiety      PEx  Temp:  [96.2 °F (35.7 °C)-98.3 °F (36.8 °C)]   Pulse:  []   Resp:  []   BP: ()/()   SpO2:  [93 %-99 %]     Intake/Output Summary (Last 24 hours) at 04/16/18 1006  Last data filed at 04/16/18 0900   Gross per 24 hour   Intake              150 ml   Output             2275 ml   Net            -2125 ml       General Appearance: no acute distress   Heart: regular rate and rhythm  Respiratory: Normal respiratory effort, no crackles   Abdomen: Soft, non-tender; bowel sounds active  Skin: intact. IV sites ok.  LLE erythema and warmth medially  Neurologic:  No focal numbness or weakness  Mental status: Alert, oriented x 4, affect  appropriate       Recent Labs  Lab 04/14/18  1113 04/15/18  0543 04/16/18  0552   WBC 4.85 4.02 3.48*   HGB 10.3* 9.5* 10.1*   HCT 29.4* 28.8* 29.8*    173 164       Recent Labs  Lab 04/10/18  2005  04/15/18  0542 04/15/18  0543 04/15/18  2042 04/16/18  0552     < >  --  138 138 139   K 3.8  < >  --  3.7 3.8 3.1*   CL 98  < >  --  102 100 101   CO2 24  < >  --  25 29 25   BUN 14  < >  --  11 7* 5*   CREATININE 0.9  < >  --  0.7 0.7 0.6   *  < >  --  104 125* 112*   CALCIUM 9.0  < >  --  8.4* 8.4* 7.9*   MG  --   < > 1.1*  --  1.6 1.5*   PHOS  --   < >  --  2.3* 3.3 3.0   LIPASE 43  --   --   --   --   --    AMYLASE 37  --   --   --   --   --    < > = values in this interval not displayed.    Recent Labs  Lab 04/10/18  2005   ALKPHOS 47*   ALT 16   AST 25   ALBUMIN 2.9*   PROT 6.4   BILITOT 1.5*   INR 1.1        Recent Labs  Lab 04/10/18  2357 04/12/18  2154 04/13/18  0741 04/15/18  1900   POCTGLUCOSE 112* 113* 112* 118*     No results for input(s): CPK, CPKMB, MB, TROPONINI in the last 72 hours.    Scheduled Meds:   azaTHIOprine  150 mg Oral Daily    clindamycin (CLEOCIN) IVPB  600 mg Intravenous Q8H    methylPREDNISolone sodium succinate  10 mg Intravenous Daily    pantoprazole 40 mg in dextrose 5 % 100 mL infusion (ready to mix system)  40 mg Intravenous BID     Continuous Infusions:   dextrose 5% lactated ringers 75 mL/hr at 04/16/18 0554     As Needed:  acetaminophen, albuterol-ipratropium 2.5mg-0.5mg/3mL, bisacodyl, dextrose 50%, dextrose 50%, glucagon (human recombinant), glucose, glucose, hydrALAZINE, metoprolol, morphine, omnipaque, ondansetron, promethazine (PHENERGAN) IVPB, senna-docusate 8.6-50 mg, sodium chloride 0.9%    Active Hospital Problems    Diagnosis  POA    *Atrial fibrillation [I48.91]  Unknown    Hypophosphatemia [E83.39]  Yes    Gastric carcinoma [C16.9]  Unknown    Weakness [R53.1]  Unknown    Esophageal spasm [K22.4]  Unknown    History of DVT (deep vein  thrombosis) [Z86.718]  Not Applicable    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 May change pred to SM and hold  Aza for short period if unable to swallow.  -Follow with strict I/Os, daily weights, BP (goal <140/90), daily labs.    -Check immuknow cylex to better assess net IS.      Hypokalemia [E87.6]  Yes    Hypomagnesemia [E83.42]  Yes    Nonrheumatic aortic valve disorder [I35.9]  Unknown    Esophageal obstruction [K22.2]  Yes    Bilateral edema of lower extremity [R60.0]  Unknown    Dehydration [E86.0]  Unknown      Resolved Hospital Problems    Diagnosis Date Resolved POA   No resolved problems to display.       Overview: Mr. Elbert Connelly is a 67 y.o. male with medical problems including HTN, DVT/PE (IVC filter), renal disorder, and history of kidney transplant who presents with complaint of abdominal pain, fatigue, weakness, dizziness and esophageal obstruction.        Assessment and Plan for Problems addressed today:    Esophageal obstruction  Esophageal spasm  Gastric carcnoma  - AES consult for EGD/EUS for gastric cancer  - Surgical oncology consult and appreciate recs  - Will need documentation from OSH  - Unable to tolerate saliva but protecting airway.  - Strict NPO, aspiration precautions  - Not currently on anticoagulation- will need to be restarted on this admission  - LR mIVF x12 hours  - 4/11 - Maumus: NPO for procedure/surgery and aspiration concern, IV hydration and trend labs for electrolytes . CT thorax/abdomen/pelvis w/IV contrast for staging  - EUS tomorrow pending CT results  - SLP consult for recs re: meds/diet   - Surg Onc consultation - paged 12 noon  - 4/12: U/S endoscopy upper cancelled for cardiovascular reasons  - 4/13: follow up  with GI about EGD  - 4/14: Scheduled for EGD Monday. Consider feeding tube early next week, possibly starting TPN today.   - 4/15: EGD Monday. PICC line order placed overnight. Consider starting TPN after EGD, and consider feeding tube early next week.   -4/16: EGD held due to afib with RVR overnight. PICC line placement today to start TPN.    Cellulitis LLE  -LLE erythema and warmth medially. Started on clindamycin 600mg q8hrs on 4/13.     CKD Stage II (mild)  S/P kidney transplant (Stable)  - One functioning transplanted kidney  - KTM consult  - Avoid nephrotoxic agents, including contrast  - will defer abdominal imaging decision for cancer to KTM/surg-onc team  - Cr. 0.9, lytes WNL  - Mag/Phos   - Will change prednisone 10 mg PO daily to soludmedrol 10 mg IV daily while unable to tolerate PO  - Wife able to give patient imuran 150 mg PO daily crushed up in pudding at home.  No IV available, per pharmacy. Continue PO Imuran as long as patient can tolerate.  - 4/11 - maumus: Stable functioning kidney Cr .7 BUN 14, continue PO Imuran as long as tolerated.      Atrial Fibrillation  -Rate controlled  -Cardiology consulted: repeat ECHO when deemed appropriate,   -OSH records show pre-existing LBBB, no further evaluation needed.  -4/16: run of afib with RVR overnight, controlled with lopressor and one dose digoxin.    History of DVT (deep vein thrombosis)  H/o DVT and PE  - Currently not on AC.  Was on warfarin for years, but switched to eliquis.  Eliquis recently stopped d/t inability to tolerate PO.  Will need to be restarted on eliquis after EGD/surgical intreventions.    Patient does not know dosing.   - IVC filter currently in place  - Currently in hypercoagulable state  - 4/11 - Maumus: Continue treatment with eliquis after intervention with swallowing difficulty.     Dehydration  -2/2 esophageal obstruction  - IVF replacement  - 4/11 - Maumus: Continue IV hydration     Chronic Bilateral edema of lower  extremity  - BNP 65 in setting of obesity  - will order 2D ECHO  - no erythema, swelling appears equal     Weakness  - PT/OT consult  - 4/11 - Maumus: PT/OT consult when ready from intervention of esophageal spasm and gastric cancer     Hypomagnesemia  -1.3 (4/11)-->1.6 (4/13) monitor  -4/16 Mg 1.5-replaced    Hypertension  - Stable  - Continue losartan 50 mg PO daily, cardizem 60 mg PO TID when able to tolerate PO    Hypokalemia  -4/16 K-3.1-replaced    DVT PPx: enoxaparin    Discharge plan and follow up    Provider    I personally scribed for Dwight Ware MD on 04/16/2018 at 10:43 AM. Electronically signed by konstantin Eden on 04/16/2018 at 10:43 AM.

## 2018-04-16 NOTE — PROGRESS NOTES
"[facility]  Kidney Transplant  Progress Note      Reason for Follow-up: Reassessment of  recipient and management of immunosuppression.    ORGAN:     Donor Type:           Subjective:   History of Present Illness:  Elbert is a 68 yo WM s/p living donor kidney [place] from [place] 1982 [place] [facility] [place] admitted for N/V and newly diagnosed gastric cancer for multidisciplinary GI and surgical oncology eval.  He reports no issues of rejection or other txp complications. He has been maintained on prednisone 10 mg daily and azathioprine 150 mg daily. (He reports taking cyclosporine for only a short period after txp).  He states he has been able to keep his IS down by crushing meds in applesauce, but cannot eat much; wife states liquids stay down for a few minutes before he vomits it back up. From kidney standpoint, he reports no past  issues except remote UTI "years ago," but he started with urinary incontinence in very recent past. He denies pain over allograft, frequency or urgency.    He states his current illness began in January when he started with vomiting. He states he has lost 60#s since then, which he attributes to difficulty tolerating PO intake. He notes he has been in an out of the hospital multiple times over the past 3 months for similar issue.  Last week he states and EGD w/ biopsies showed stomach cancer.        Hospital Course:  No notes on file    Interval History:  -afib RVR overnight; dig started  -pt has no real complaints but wants to get his nutrition up    Past Medical, Surgical, Family, and Social History:   Unchanged from H&P.    Scheduled Meds:   azaTHIOprine  150 mg Oral Daily    clindamycin (CLEOCIN) IVPB  600 mg Intravenous Q8H    methylPREDNISolone sodium succinate  10 mg Intravenous Daily    pantoprazole 40 mg in dextrose 5 % 100 mL infusion (ready to mix system)  40 mg Intravenous BID     Continuous Infusions:   dextrose 5% lactated " "ringers 75 mL/hr at 04/16/18 0554     PRN Meds:acetaminophen, albuterol-ipratropium 2.5mg-0.5mg/3mL, bisacodyl, dextrose 50%, dextrose 50%, glucagon (human recombinant), glucose, glucose, hydrALAZINE, metoprolol, morphine, omnipaque, ondansetron, promethazine (PHENERGAN) IVPB, senna-docusate 8.6-50 mg, sodium chloride 0.9%    Intake/Output - Last 3 Shifts       04/14 0700 - 04/15 0659 04/15 0700 - 04/16 0659 04/16 0700 - 04/17 0659    P.O. 0 0     I.V. (mL/kg) 100 (1.1) 150 (1.7)     IV Piggyback 50 50 50    Total Intake(mL/kg) 150 (1.7) 200 (2.3) 50 (0.6)    Urine (mL/kg/hr) 875 (0.4) 1800 (0.8) 225 (1.3)    Stool 0 (0) 0 (0)     Total Output 875 1800 225    Net -725 -1600 -175           Stool Occurrence 0 x 0 x            Review of Systems   Objective:     Vital Signs (Most Recent):  Temp: 96.2 °F (35.7 °C) (04/16/18 0445)  Pulse: (!) 57 (04/16/18 0700)  Resp: 18 (04/16/18 0445)  BP: (!) 157/82 (04/16/18 0445)  SpO2: 95 % (04/16/18 0445) Vital Signs (24h Range):  Temp:  [96.2 °F (35.7 °C)-98.3 °F (36.8 °C)] 96.2 °F (35.7 °C)  Pulse:  [] 57  Resp:  [] 18  SpO2:  [93 %-98 %] 95 %  BP: ()/() 157/82     Weight: 88.8 kg (195 lb 12.3 oz)  Height: 5' 7" (170.2 cm)  Body mass index is 30.66 kg/m².    Physical Exam  GEN - AAOx4, NAd  CHEST  -CTA B anterior auscultation  ABD - soft; nonttp  EXTR  -warm; 2+ pitting edema to thighs  Laboratory:  Component      Latest Ref Rng & Units 4/16/2018   Sodium      136 - 145 mmol/L 139   Potassium      3.5 - 5.1 mmol/L 3.1 (L)   Chloride      95 - 110 mmol/L 101   CO2      23 - 29 mmol/L 25   Glucose      70 - 110 mg/dL 112 (H)   BUN, Bld      8 - 23 mg/dL 5 (L)   Creatinine      0.5 - 1.4 mg/dL 0.6   Calcium      8.7 - 10.5 mg/dL 7.9 (L)   Anion Gap      8 - 16 mmol/L 13   eGFR if African American      >60 mL/min/1.73 m:2 >60.0   eGFR if non African American      >60 mL/min/1.73 m:2 >60.0     Assessment/Plan:     * Atrial fibrillation    -Back in sinus " rhythm  -Hypomag and hypoK will potentially aggravate cardiac arrhythmias, and need close attention and aggressive repletion. Monitor K/Mg daily and replace IV prn        Hypophosphatemia    -Was hypophosphatemic and expect will fall once starts nutrition from refeeding syndrome.  -Level today low--> replete IV after IV Mg and atb completed. Cont to monitor daily.        Hypomagnesemia    -Mod-Severely depleted.  -Ordered IV replacement 4gr today  -Monitor and replace aggressively since had arrhythmia           Hypokalemia    -Corrected. Some being added to IVF (LR). Follow lab closely.           Prophylactic immunotherapy      -Cont IV prednisone; po azathioprine  -Cylex ordered and pending        Chronic kidney disease (CKD), stage II (mild)    -stable  -CKD2 s/p kidney transplantation  remains stable Continue to monitor renal function and electrolytes, HTN, secondary hyperparathyroidism and other issues related to underlying ESRD.   -While NPO and awaiting further eval, I recommend maint IVF  -Avoid further renal insults, when possible: nephrotoxins, volume shifts, aim for BP within target.           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          Bilateral edema of lower extremity    -Hypoalbuminemia and immobility contributing factors.              Discharge Planning:  Pending improved nutritional status    Elbert Joe II, MD  Kidney Transplant  [facility]

## 2018-04-16 NOTE — PHYSICIAN QUERY
PT Name: Elbert Connelly  MR #: [id number]    Physician Query Form - Atrial Fibrillation Specificity     CDS/: Paige Hairston RN, CCDS               Contact information:  [email or web address]           This form is a permanent document in the medical record.     Query Date: April 16, 2018    By submitting this query, we are merely seeking further clarification of documentation. Please utilize your independent clinical judgment when addressing the question(s) below.    The medical record contains the following:   Indicators     Supporting Clinical Findings Location in Medical Record    Atrial Fibrillation Atrial fibrillation with RVR    Atrial fibrillation    EKG pn 4/12 with A fib and LBBB - converted to NSR     Attempted EUS yesterday but patient went into unstable a fib with RVR requiring CPR for unknown amount of time, now on cardiac unit.    Afib with RVR Hospital Medicine progress note 04/12    Cardiology consult 04/13      General Surgery progress note 04/15 1:28 PM      Hospital Medicine Significant Event 04/15 10:00 PM    EKG results EKG pn 4/12 with A fib and LBBB - converted to NSR  Cardiology consult 04/13    Medication Will give IV lopressor     x3 metoprolol doses 5 mg IV   will give digoxin  Hospital Medicine progress note 04/12    Hospital Medicine Significant Event 04/15 10:00PM    Treatment      Other U/S endoscopic upper cancelled for cardiovascular complication, SR converted to ST after intubation with hypotension. Given phenylephrine 100mcg.     Recommend hydration for suspected depleted intravascular volume     Atrial Fibrillation  -Rate controlled  -Cardiology consulted: repeat ECHO when deemed appropriate,   -[facility] records show pre-existing LBBB, no further evaluation needed.    - BMP, Mag Phos-  K 3.8- will replace.  Mag 1.6- will replace  - x3 metoprolol doses 5 mg IV brought HR down from 130s-140s to low 100s.  Concern for rhythm exacerbating EGD procedure in AM.  Discussed case with  cardiology and will give digoxin .5 mg IV and recheck rhythm.  May need further doses of digoxin.  Cr .7.  Hospital Medicine progress note 04/12        Cardiology consult 04/13    Hospital Medicine progress note 04/14          Hospital Medicine Significant Event 04/15 10:00 PM       Provider, please further specify the Atrial Fibrillation diagnosis.    [x  ] Paroxysmal  [  ] Other (please specify): ____________________________  [  ] Clinically Undetermined    Please document in your progress notes daily for the duration of treatment until resolved, and include in your discharge summary.

## 2018-04-16 NOTE — SIGNIFICANT EVENT
Rapid Response Nurse Note     Rapid Response Metrics:     Admit Date: 4/10/2018  LOS: 5  Code Status: Full Code   Date of Consult: 04/15/2018  : 1950  Age: 67 y.o.  Weight:   Wt Readings from Last 1 Encounters:   18 88.8 kg (195 lb 12.3 oz)     Race:   Sex: male  Bed: 326/326 A:   MRN: [id number]  Time Rapid Response Team page Received:   Time Rapid Response Team at Bedside:   Time Rapid Response Team left Bedside:   Was the patient discharged from an ICU this admission? No  Was the patient discharged from a PACU within last 24 hours? No  Did the patient receive conscious sedation/general anesthesia within last 24 hours? No  Was the patient in the ED within the past 24 hours? No  Was the patient started on NIPPV within the past 24 hours? No  Attending Physician: Dwight Ware MD  Primary Service: [facility]OS[facility] MED B  Consult Requested By: Dwight Ware MD   Rapid Response Indication(s): AFIB RVR  Disposition: Remain on floor    SITUATION:     Reason for Call:   Called to evaluate the patient for Dysrhythmia     BACKGROUND:     Why is the patient in the hospital?: Gastric CA  What changed?: HR    ASSESSMENT:     What did you find: Upon arrival, patient lying in bed, wife at bedside. /100, 's. EKG being performed. 94% sats on RA. Denies any new discomfort. Bedside RN states she has tried paging the primary physician multiple times with no answer, and so called RRT. Dr. Muhammad called to bedside to evaluate, at first Lopressor to be given, but patient's HR started to decrease, and EKG showed wide complexes.     RECOMMENDATIONS:     We recommend: Hold Lopressor for now, please call back if patient with sustained HR >150, or decompensates, or becomes symptomatic.     FOLLOW-UP/CONTINGENCY PLAN:     Call back the rapid Response Nurse at [phone]  For additional concerns and questions    ADDENDUM: KATIE Simons called at 1940. Above events discussed, stated he will order Lopressor  now for current HR 140s.     PHYSICIAN ESCALATION:     Orders received and case discussed with Dr Muhammad.

## 2018-04-16 NOTE — SIGNIFICANT EVENT
Significant Event  Hospital Medicine    CC:  Atrial fibrillation  Date:  04/15/2018  Admit Date:  4/10/2018  Hospital Length of Stay:  5    Code Status:  Full Code     SUBJECTIVE:     Significant Events:  Called urgently to the bedside by nurse to evaluate patient for afib with RVR.  Patient asymptomatic and HDS.   EGD on 4/13 exacerbated by hypotension and new onset A fib with RVR.  Card evaluated and controlled by amiodarone and epinephrine.  No currently on rate control or anticoagulation.         OBJECTIVE:     Physical Exam:  Last Vitals:   Vitals:    04/15/18 2004   BP: 139/85   Pulse: 94   Resp: (!) 115   Temp: 98.1 °F (36.7 °C)     GA:  Ill-appearing male in NAD  HEENT:  R eye lateral gaze with slight brow droop, but EOM normal  Cardiac:  Irregularly irregular rhythm  Neuro:  -- Oriented to person, place, time. Initial eye showed RUE weakness, but follow exam show strength equal bilaterally.  No pronator dirft, No facial droop.  Speech approporiate.  Confirmed with wife that R eye gaze is different from normal.      ASSESSMENT AND PLAN/INTERVENTIONS:     1) Afib with RVR  Plan/Interventions:  - BMP, Mag Phos-  K 3.8- will replace.  Mag 1.6- will replace  - x3 metoprolol doses 5 mg IV brought HR down from 130s-140s to low 100s.  Concern for rhythm exacerbating EGD procedure in AM.  Discussed case with cardiology and will give digoxin .5 mg IV and recheck rhythm.  May need further doses of digoxin.  Cr .7.   - Discussed case with Dr. Ware- will not start anticoagulation d/t EGD procedure in [place]- but will need to restart soon.     - R eye lateral gaze- Will order CTH w/o..  Discussed with NCC.      Uninterrupted Critical Care/Counseling Time (not including procedures):  45    Peter Simons PA-C  Hospital Medicine Department  Staff:   Sonido Berg MD

## 2018-04-16 NOTE — PT/OT/SLP PROGRESS
Speech Language Pathology      Elbert Connelly  MRN: [id number]    ST attempted to see pt this am. Pt NPO for EGD.  ST will continue to follow.     LEROY Mclean, CCC-SLP   4/16/2018

## 2018-04-16 NOTE — TREATMENT PLAN
AES Note    Patient with another episode of AFib w/RVR overnight.     Will hold on repeating EUS until patient more hemodynamically stable (the EUS is essentially an outpatient procedure).      Rene Andrea   Gastroenterology Fellow PGY VI  [phone]

## 2018-04-17 LAB
ANION GAP SERPL CALC-SCNC: 7 MMOL/L
BASOPHILS # BLD AUTO: 0.01 K/UL
BASOPHILS NFR BLD: 0.3 %
BUN SERPL-MCNC: 5 MG/DL
CALCIUM SERPL-MCNC: 8 MG/DL
CHLORIDE SERPL-SCNC: 99 MMOL/L
CO2 SERPL-SCNC: 32 MMOL/L
CREAT SERPL-MCNC: 0.6 MG/DL
DIFFERENTIAL METHOD: ABNORMAL
EOSINOPHIL # BLD AUTO: 0 K/UL
EOSINOPHIL NFR BLD: 1.1 %
ERYTHROCYTE [DISTWIDTH] IN BLOOD BY AUTOMATED COUNT: 17.5 %
EST. GFR  (AFRICAN AMERICAN): >60 ML/MIN/1.73 M^2
EST. GFR  (NON AFRICAN AMERICAN): >60 ML/MIN/1.73 M^2
GLUCOSE SERPL-MCNC: 139 MG/DL
HCT VFR BLD AUTO: 29 %
HGB BLD-MCNC: 9.9 G/DL
IMM GRANULOCYTES # BLD AUTO: 0.03 K/UL
IMM GRANULOCYTES NFR BLD AUTO: 0.8 %
LYMPHOCYTES # BLD AUTO: 0.9 K/UL
LYMPHOCYTES NFR BLD: 25.7 %
MAGNESIUM SERPL-MCNC: 1.5 MG/DL
MCH RBC QN AUTO: 32.4 PG
MCHC RBC AUTO-ENTMCNC: 34.1 G/DL
MCV RBC AUTO: 95 FL
MONOCYTES # BLD AUTO: 0.1 K/UL
MONOCYTES NFR BLD: 2.8 %
NEUTROPHILS # BLD AUTO: 2.5 K/UL
NEUTROPHILS NFR BLD: 69.3 %
NRBC BLD-RTO: 1 /100 WBC
PLATELET # BLD AUTO: 151 K/UL
PMV BLD AUTO: 9.8 FL
POTASSIUM SERPL-SCNC: 3 MMOL/L
RBC # BLD AUTO: 3.06 M/UL
SODIUM SERPL-SCNC: 138 MMOL/L
WBC # BLD AUTO: 3.62 K/UL

## 2018-04-17 PROCEDURE — 63600175 PHARM REV CODE 636 W HCPCS: Performed by: PHYSICIAN ASSISTANT

## 2018-04-17 PROCEDURE — 99233 SBSQ HOSP IP/OBS HIGH 50: CPT | Mod: GC,,, | Performed by: INTERNAL MEDICINE

## 2018-04-17 PROCEDURE — 25000242 PHARM REV CODE 250 ALT 637 W/ HCPCS: Performed by: PHYSICIAN ASSISTANT

## 2018-04-17 PROCEDURE — S0077 INJECTION, CLINDAMYCIN PHOSP: HCPCS | Performed by: PHYSICIAN ASSISTANT

## 2018-04-17 PROCEDURE — 97116 GAIT TRAINING THERAPY: CPT

## 2018-04-17 PROCEDURE — 20600001 HC STEP DOWN PRIVATE ROOM

## 2018-04-17 PROCEDURE — 94761 N-INVAS EAR/PLS OXIMETRY MLT: CPT

## 2018-04-17 PROCEDURE — 25000003 PHARM REV CODE 250: Performed by: PHYSICIAN ASSISTANT

## 2018-04-17 PROCEDURE — 25000003 PHARM REV CODE 250: Performed by: INTERNAL MEDICINE

## 2018-04-17 PROCEDURE — A4216 STERILE WATER/SALINE, 10 ML: HCPCS | Performed by: INTERNAL MEDICINE

## 2018-04-17 PROCEDURE — C9113 INJ PANTOPRAZOLE SODIUM, VIA: HCPCS | Performed by: PHYSICIAN ASSISTANT

## 2018-04-17 PROCEDURE — 27000221 HC OXYGEN, UP TO 24 HOURS

## 2018-04-17 PROCEDURE — 97530 THERAPEUTIC ACTIVITIES: CPT

## 2018-04-17 PROCEDURE — 83735 ASSAY OF MAGNESIUM: CPT

## 2018-04-17 PROCEDURE — 63600175 PHARM REV CODE 636 W HCPCS: Performed by: HOSPITALIST

## 2018-04-17 PROCEDURE — 80048 BASIC METABOLIC PNL TOTAL CA: CPT

## 2018-04-17 PROCEDURE — 25000003 PHARM REV CODE 250: Performed by: HOSPITALIST

## 2018-04-17 PROCEDURE — 85025 COMPLETE CBC W/AUTO DIFF WBC: CPT

## 2018-04-17 PROCEDURE — 94640 AIRWAY INHALATION TREATMENT: CPT

## 2018-04-17 PROCEDURE — 99233 SBSQ HOSP IP/OBS HIGH 50: CPT | Mod: ,,, | Performed by: HOSPITALIST

## 2018-04-17 RX ORDER — MORPHINE SULFATE 4 MG/ML
2 INJECTION, SOLUTION INTRAMUSCULAR; INTRAVENOUS ONCE
Status: DISCONTINUED | OUTPATIENT
Start: 2018-04-17 | End: 2018-04-19 | Stop reason: HOSPADM

## 2018-04-17 RX ORDER — DOXYLAMINE SUCCINATE 25 MG
TABLET ORAL 2 TIMES DAILY
Status: DISCONTINUED | OUTPATIENT
Start: 2018-04-17 | End: 2018-04-19 | Stop reason: HOSPADM

## 2018-04-17 RX ORDER — POTASSIUM CHLORIDE 7.45 MG/ML
10 INJECTION INTRAVENOUS
Status: COMPLETED | OUTPATIENT
Start: 2018-04-17 | End: 2018-04-17

## 2018-04-17 RX ORDER — MAGNESIUM SULFATE HEPTAHYDRATE 40 MG/ML
2 INJECTION, SOLUTION INTRAVENOUS ONCE
Status: COMPLETED | OUTPATIENT
Start: 2018-04-17 | End: 2018-04-17

## 2018-04-17 RX ORDER — ENOXAPARIN SODIUM 100 MG/ML
1 INJECTION SUBCUTANEOUS
Status: DISCONTINUED | OUTPATIENT
Start: 2018-04-17 | End: 2018-04-19 | Stop reason: HOSPADM

## 2018-04-17 RX ADMIN — THIAMINE HYDROCHLORIDE 100 MG: 100 INJECTION, SOLUTION INTRAMUSCULAR; INTRAVENOUS at 04:04

## 2018-04-17 RX ADMIN — ENOXAPARIN SODIUM 90 MG: 100 INJECTION SUBCUTANEOUS at 09:04

## 2018-04-17 RX ADMIN — POTASSIUM CHLORIDE 10 MEQ: 7.46 INJECTION, SOLUTION INTRAVENOUS at 10:04

## 2018-04-17 RX ADMIN — DEXTROSE 40 MG: 50 INJECTION, SOLUTION INTRAVENOUS at 10:04

## 2018-04-17 RX ADMIN — CLINDAMYCIN IN 5 PERCENT DEXTROSE 600 MG: 12 INJECTION, SOLUTION INTRAVENOUS at 11:04

## 2018-04-17 RX ADMIN — ASCORBIC ACID, VITAMIN A PALMITATE, CHOLECALCIFEROL, THIAMINE HYDROCHLORIDE, RIBOFLAVIN-5 PHOSPHATE SODIUM, PYRIDOXINE HYDROCHLORIDE, NIACINAMIDE, DEXPANTHENOL, ALPHA-TOCOPHEROL ACETATE, VITAMIN K1, FOLIC ACID, BIOTIN, CYANOCOBALAMIN: 200; 3300; 200; 6; 3.6; 6; 40; 15; 10; 150; 600; 60; 5 INJECTION, SOLUTION INTRAVENOUS at 09:04

## 2018-04-17 RX ADMIN — CLINDAMYCIN IN 5 PERCENT DEXTROSE 600 MG: 12 INJECTION, SOLUTION INTRAVENOUS at 06:04

## 2018-04-17 RX ADMIN — IPRATROPIUM BROMIDE AND ALBUTEROL SULFATE 3 ML: .5; 3 SOLUTION RESPIRATORY (INHALATION) at 11:04

## 2018-04-17 RX ADMIN — Medication 10 ML: at 11:04

## 2018-04-17 RX ADMIN — Medication 10 ML: at 05:04

## 2018-04-17 RX ADMIN — MAGNESIUM SULFATE IN WATER 2 G: 40 INJECTION, SOLUTION INTRAVENOUS at 08:04

## 2018-04-17 RX ADMIN — MICONAZOLE NITRATE: 20 CREAM TOPICAL at 10:04

## 2018-04-17 RX ADMIN — Medication 10 ML: at 04:04

## 2018-04-17 RX ADMIN — ONDANSETRON 4 MG: 4 TABLET, ORALLY DISINTEGRATING ORAL at 08:04

## 2018-04-17 RX ADMIN — AZATHIOPRINE 150 MG: 50 TABLET ORAL at 10:04

## 2018-04-17 RX ADMIN — POTASSIUM CHLORIDE 10 MEQ: 7.46 INJECTION, SOLUTION INTRAVENOUS at 08:04

## 2018-04-17 RX ADMIN — POTASSIUM CHLORIDE 10 MEQ: 7.46 INJECTION, SOLUTION INTRAVENOUS at 11:04

## 2018-04-17 RX ADMIN — DEXTROSE 40 MG: 50 INJECTION, SOLUTION INTRAVENOUS at 12:04

## 2018-04-17 RX ADMIN — DEXTROSE, SODIUM CHLORIDE, SODIUM LACTATE, POTASSIUM CHLORIDE, AND CALCIUM CHLORIDE: 5; .6; .31; .03; .02 INJECTION, SOLUTION INTRAVENOUS at 08:04

## 2018-04-17 RX ADMIN — POTASSIUM CHLORIDE 10 MEQ: 7.46 INJECTION, SOLUTION INTRAVENOUS at 12:04

## 2018-04-17 RX ADMIN — CLINDAMYCIN IN 5 PERCENT DEXTROSE 600 MG: 12 INJECTION, SOLUTION INTRAVENOUS at 04:04

## 2018-04-17 RX ADMIN — Medication 10 ML: at 01:04

## 2018-04-17 RX ADMIN — METHYLPREDNISOLONE SODIUM SUCCINATE 10 MG: 40 INJECTION, POWDER, FOR SOLUTION INTRAMUSCULAR; INTRAVENOUS at 10:04

## 2018-04-17 NOTE — PHYSICIAN QUERY
"PT Name: Elbert Connelly  MR #: [id number]    Physician Query Form - Hematology Clarification      CDS/: Paige Hairston RN, CCDS               Contact information:  [email or web address]      This form is a permanent document in the medical record.      Query Date: April 17, 2018    By submitting this query, we are merely seeking further clarification of documentation. Please utilize your independent clinical judgment when addressing the question(s) below.    The Medical record contains the following:   Indicators  Supporting Clinical Findings Location in Medical Record   X "Anemia" documented Anemia  Hospital Medicine progress note 04/17 2:47 PM   X H & H = Hemoglobin  9.5 - 11.2 [range]  Hematocrit  28.2 - 33.1 [range] Lab 04/10/18 - 04/17/18    BP =                     HR=      "GI bleeding" documented      Acute bleeding (Non GI site)      Transfusion(s)      Treatment:     X Other:  Esophageal obstruction  Esophageal spasm  Gastric carcnoma  Dysphagia x3 months with 4 EGDs without relief of symptoms.  Biopsies taken during previous EGD showed gastric carcinoma.     S/P kidney transplant  - Wife able to give patient imuran 150 mg PO daily crushed up in pudding at home.  No IV available, per pharmacy. Continue PO Imuran as long as patient can tolerate.    Chronic kidney disease (CKD), stage II (mild)  -CKD2 s/p kidney transplantation.  H & P 04/11            H & P 04/11          Transplant Kidney consult 04/11     Provider, please specify diagnosis or diagnoses associated with above clinical findings.    [ x ] Anemia of chronic disease ( Specify chronic disease)      [  ] CKD (specify stage) ___________________________     [ x ] Neoplastic disease      [  ] Other (Specify) _______________________________     [  ] Clinically Undetermined     [  ] Other Hematological Diagnosis (please specify): _________________________________    [  ] Clinically Undetermined       Please document in your progress notes daily for " the duration of treatment, until resolved, and include in your discharge summary.

## 2018-04-17 NOTE — PT/OT/SLP PROGRESS
"Physical Therapy Treatment    Patient Name:  Elbert Connelly   MRN:  [id number]    Recommendations:     Discharge Recommendations:   ( SNF, may progress to Home Health)   Discharge Equipment Recommendations: none   Barriers to discharge: Decreased caregiver support (wife works during the day)    Assessment:     Elbert Connelly is a 67 y.o. male admitted with a medical diagnosis of Atrial fibrillation.  He presents with the following impairments/functional limitations:  weakness, impaired endurance, impaired self care skills, impaired functional mobilty, gait instability, impaired balance, decreased lower extremity function, pain, decreased safety awareness, impaired cardiopulmonary response to activity . Pt was limited t/o session due to fatigue and BLE pain. Pt requires inc'd encouragement for full participation. He is resistant to cueing from PT for safe techniques w/ bed mobility and transfers. Currently pt is recommended to D/C to  SNF due to current level of function and home situation. Pt will continue PT POC.    Rehab Prognosis:  good; patient would benefit from acute skilled PT services to address these deficits and reach maximum level of function.      Recent Surgery: Procedure(s) (LRB):  ULTRASOUND-ENDOSCOPIC-UPPER (N/A) 5 Days Post-Op    Plan:     During this hospitalization, patient to be seen 4 x/week to address the above listed problems via gait training, therapeutic activities, therapeutic exercises  · Plan of Care Expires:  05/13/18   Plan of Care Reviewed with: patient    Subjective     Communicated with nursing prior to session.  Patient found supine upon PT entry to room, agreeable to treatment.  Wife present but sleeping in bedside chair t/o session.    Chief Complaint: BLE pain  Patient comments/goals: "I need hip and knee replacements. This is bone on bone!"  Pain/Comfort:  · Pain Rating 1: 7/10  · Location - Side 1: Bilateral  · Location - Orientation 1: generalized  · Location 1: " "leg  · Pain Addressed 1: Pre-medicate for activity, Reposition  · Pain Rating Post-Intervention 1: 7/10    Patients cultural, spiritual, Episcopal conflicts given the current situation: none reported    Objective:     Patient found with: PICC line, telemetry, oxygen     General Precautions: Standard, fall, NPO   Orthopedic Precautions:N/A   Braces: N/A     Functional Mobility:  · Bed Mobility:   · Supine>sit on bed w/ MaxA for trunk and BLE, vc's required for technique but pt refusing to attempt "walking" BLE off side of bed  · Sit>supine on bed w/ ModA for BLE  · Scooting in supine to HOB w/ SPV, pt uses side rails to pull himself towards HOB  · Transfers:    · Sit<>stand to/from EOB w/ RW and ModA to rise, vc's for hand placement on side of bed for safety pt pt refusing and insisting that he pull himself up w/ BUE on RW, PT holding RW steady on floor to prevent posterior LOB  · Gait:   · 4 lateral steps left/right (3 trials in each direction), w/ RW and CGA for safety, pt declining further gait trial in room due to pain and fatigue  · No LOB  · Balance:   · Static sit EOB w/ SPV  · Static stand w/ RW and CGA for safety      AM-PAC 6 CLICK MOBILITY  Turning over in bed (including adjusting bedclothes, sheets and blankets)?: 3  Sitting down on and standing up from a chair with arms (e.g., wheelchair, bedside commode, etc.): 2  Moving from lying on back to sitting on the side of the bed?: 2  Moving to and from a bed to a chair (including a wheelchair)?: 2  Need to walk in hospital room?: 3  Climbing 3-5 steps with a railing?: 1  Total Score: 13       Therapeutic Activities and Exercises:  Pt declined seated BLE therex  Supine therex x10 reps (GS, QS, AP, HS, ABd/ADd) w/ AAROM for HS and ABd/ADd    Patient left supine with all lines intact, call button in reach and wife present..    GOALS:    Physical Therapy Goals        Problem: Physical Therapy Goal    Goal Priority Disciplines Outcome Goal Variances Interventions "   Physical Therapy Goal     PT/OT, PT Ongoing (interventions implemented as appropriate)     Description:  Goals to be met by: 2018     Patient will increase functional independence with mobility by performin. Supine to sit with Contact Guard Assistance.  2. Rolling to Left and Right with Stand-by Assistance.  3. Sit to stand transfer with Contact Guard Assistance with appropriate AD.  4. Bed to chair transfer with Contact Guard Assistance with appropriate AD.  5. Gait  x 100 feet with Stand-by Assistance with appropriate AD.  6. Lower extremity exercise program x 20 reps per handout, with supervision.                      Time Tracking:     PT Received On: 18  PT Start Time: 1046     PT Stop Time: 1109  PT Total Time (min): 23 min     Billable Minutes: Gait Training 10, Therapeutic Activity 13 and Total Time 23    Treatment Type: Treatment  PT/PTA: PT     PTA Visit Number: 0     Mary Ellen Ramirez, PT  2018

## 2018-04-17 NOTE — MEDICAL/APP STUDENT
Hospital Medicine  Progress note    Team: [facility] [place] [facility] B Dr. Villasenor  Admit Date: 4/10/2018  LIO 4/20/2018  Code status: Full Code    Principal Problem:  Atrial fibrillation    Interval hx:    4/11: H&P note  4/12 - Chaz: U/S endoscopic upper cancelled for cardiovascular complication, SR converted to ST after intubation with hypotension. Given phenylephrine 100mcg.   4/13: Talk to GI about EGD. General surgery states most likely will require neoadjuvant therapy prior to surgical intervention.    4/14: LLE erythema and warmth medially. Started on clindamycin 600mg q8hrs. Scheduled for EGD Monday. Consider feeding tube early next week, possibly starting TPN today.   4/15: EGD Monday. Placed PICC line order overnight. Possibly start TPN after EGD, consider feeding tube next week.   4/16: Had a run of Afib with RVR last night. Controlled with lopressor and one dose digoxin. EGD held until patient more hemodynamically stable. PICC line placement today to start TPN. Replaced K and Mg.  4/17: Start TPN today 20ml/hr. Started morphine IV PRN for pain relief. Heme/Onc consult. AES rec: EGD outpatient in 2 weeks once medically optimized. Replaced K and Mg. Heme/Onc consult Nephrology rec: CMV testing ordered for leukopenia, cylex ordered and pending.     ROS     Constitutional: Increased Fatigue  Pain Scale: 0 /10  Respiratory: no cough or shortness of breath  Cardiovascular: no chest pain or palpitations  Gastrointestinal: Nausea. No vomiting, change in bowel habits. Abdominal pain last night.   Behavioral/Psych: no depression or anxiety      PEx  Temp:  [97.6 °F (36.4 °C)-98.4 °F (36.9 °C)]   Pulse:  [57-81]   Resp:  [16]   BP: (129-162)/(73-89)   SpO2:  [95 %-100 %]     Intake/Output Summary (Last 24 hours) at 04/17/18 1008  Last data filed at 04/17/18 0500   Gross per 24 hour   Intake             1526 ml   Output             1250 ml   Net              276 ml       General Appearance: no acute distress   Heart: regular  rate and rhythm  Respiratory: Normal respiratory effort, no crackles   Abdomen: Soft, non-tender; bowel sounds active  Skin: intact. IV sites ok.  LLE erythema and warmth medially  Neurologic:  No focal numbness or weakness  Mental status: Alert, oriented x 4, affect appropriate       Recent Labs  Lab 04/15/18  0543 04/16/18  0552 04/17/18  0608   WBC 4.02 3.48* 3.62*   HGB 9.5* 10.1* 9.9*   HCT 28.8* 29.8* 29.0*    164 151       Recent Labs  Lab 04/10/18  2005  04/15/18  0543 04/15/18  2042 04/16/18  0552 04/17/18  0608     < > 138 138 139 138   K 3.8  < > 3.7 3.8 3.1* 3.0*   CL 98  < > 102 100 101 99   CO2 24  < > 25 29 25 32*   BUN 14  < > 11 7* 5* 5*   CREATININE 0.9  < > 0.7 0.7 0.6 0.6   *  < > 104 125* 112* 139*   CALCIUM 9.0  < > 8.4* 8.4* 7.9* 8.0*   MG  --   < >  --  1.6 1.5* 1.5*   PHOS  --   < > 2.3* 3.3 3.0  --    LIPASE 43  --   --   --   --   --    AMYLASE 37  --   --   --   --   --    < > = values in this interval not displayed.    Recent Labs  Lab 04/10/18  2005   ALKPHOS 47*   ALT 16   AST 25   ALBUMIN 2.9*   PROT 6.4   BILITOT 1.5*   INR 1.1        Recent Labs  Lab 04/10/18  2357 04/12/18  2154 04/13/18  0741 04/15/18  1900   POCTGLUCOSE 112* 113* 112* 118*     No results for input(s): CPK, CPKMB, MB, TROPONINI in the last 72 hours.    Scheduled Meds:   azaTHIOprine  150 mg Oral Daily    clindamycin (CLEOCIN) IVPB  600 mg Intravenous Q8H    magnesium sulfate IVPB  2 g Intravenous Once    methylPREDNISolone sodium succinate  10 mg Intravenous Daily    pantoprazole 40 mg in dextrose 5 % 100 mL infusion (ready to mix system)  40 mg Intravenous BID    potassium chloride  10 mEq Intravenous Q1H    sodium chloride 0.9%  10 mL Intravenous Q6H     Continuous Infusions:   Amino acid 5% - dextrose 20% (CLINIMIX-E) solution with additives (1L provides 50 gm AA, 200 gm CHO (680 kcal/L dextrose), Na 35, K 30, Mg 5, Ca 4.5, Acetate 80, Cl 39, Phos 15) 20 mL/hr at 04/16/18 1917     dextrose 5% lactated ringers 50 mL/hr at 04/17/18 0847     As Needed:  acetaminophen, albuterol-ipratropium 2.5mg-0.5mg/3mL, bisacodyl, dextrose 50%, dextrose 50%, glucagon (human recombinant), glucose, glucose, hydrALAZINE, metoprolol, morphine, omnipaque, ondansetron, promethazine (PHENERGAN) IVPB, senna-docusate 8.6-50 mg, Flushing PICC Protocol **AND** sodium chloride 0.9% **AND** sodium chloride 0.9%, sodium chloride 0.9%    Active Hospital Problems    Diagnosis  POA    *Atrial fibrillation [I48.91]  Unknown    Protein-calorie malnutrition [E46]  Yes    Hypophosphatemia [E83.39]  Yes    Gastric carcinoma [C16.9]  Unknown    Weakness [R53.1]  Unknown    Esophageal spasm [K22.4]  Unknown    History of DVT (deep vein thrombosis) [Z86.718]  Not Applicable    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 May change pred to SM and hold  Aza for short period if unable to swallow.  -Follow with strict I/Os, daily weights, BP (goal <140/90), daily labs.    -Check immuknow cylex to better assess net IS.      Hypokalemia [E87.6]  Yes    Hypomagnesemia [E83.42]  Yes    Nonrheumatic aortic valve disorder [I35.9]  Unknown    Esophageal obstruction [K22.2]  Yes    Bilateral edema of lower extremity [R60.0]  Unknown    Dehydration [E86.0]  Unknown      Resolved Hospital Problems    Diagnosis Date Resolved POA   No resolved problems to display.       Overview: Mr. Elbert Connelly is a 67 y.o. male with medical problems including HTN, DVT/PE (IVC filter), renal disorder, and history of kidney transplant who presents with complaint of abdominal pain, fatigue, weakness, dizziness and esophageal obstruction.        Assessment and Plan for Problems addressed today:    Esophageal  obstruction  Esophageal spasm  Gastric carcnoma  - AES consult for EGD/EUS for gastric cancer  - Surgical oncology consult and appreciate recs  - Will need documentation from OSH  - Unable to tolerate saliva but protecting airway.  - Strict NPO, aspiration precautions  - Not currently on anticoagulation- will need to be restarted on this admission  - LR mIVF x12 hours  - 4/11 - [facility]umus: NPO for procedure/surgery and aspiration concern, IV hydration and trend labs for electrolytes . CT thorax/abdomen/pelvis w/IV contrast for staging  - EUS tomorrow pending CT results  - SLP consult for recs re: meds/diet   - Surg Onc consultation - paged 12 noon  - 4/12: U/S endoscopy upper cancelled for cardiovascular reasons  - 4/13: follow up with GI about EGD  - 4/14: Scheduled for EGD Monday. Consider feeding tube early next week, possibly starting TPN today.   - 4/15: EGD Monday. PICC line order placed overnight. Consider starting TPN after EGD, and consider feeding tube early next week.   -4/16: EGD held due to afib with RVR overnight. PICC line placement today to start TPN.  dynamically stable. PICC line placement today to start TPN. Replaced K and Mg.  -4/17: Started TPN today 20ml/hr. Started morphine IV PRN for pain relief. AES rec: EGD outpatient in 2 weeks once medically optimized. Heme/Onc Consult.    Cellulitis LLE  -LLE erythema and warmth medially. Started on clindamycin 600mg q8hrs on 4/13.     Hypomagnesemia  -1.3 (4/11)-->1.6 (4/13) monitor  -4/16 Mg 1.5-replaced  -4/17 1.5-replaced    Hypokalemia  -4/16 K-3.1-replaced  -4/17 3.0-replaced    Atrial Fibrillation  -Rate controlled  -Cardiology consulted: repeat ECHO when deemed appropriate,   -OSH records show pre-existing LBBB, no further evaluation needed.  -4/16: run of afib with RVR overnight, controlled with lopressor and one dose digoxin.    CKD Stage II (mild)  S/P kidney transplant (Stable)  - One functioning transplanted kidney  - KTM consult  - Avoid  nephrotoxic agents, including contrast  - will defer abdominal imaging decision for cancer to KTM/surg-onc team  - Cr. 0.9, lytes WNL  - Mag/Phos   - Will change prednisone 10 mg PO daily to soludmedrol 10 mg IV daily while unable to tolerate PO  - Wife able to give patient imuran 150 mg PO daily crushed up in pudding at home.  No IV available, per pharmacy. Continue PO Imuran as long as patient can tolerate.  - 4/11 - maumus: Stable functioning kidney Cr .7 BUN 14, continue PO Imuran as long as tolerated.   -4/17 Per nephrology: cylex ordered and pending, CMV ordered for leukopenia     History of DVT (deep vein thrombosis)  H/o DVT and PE  - Currently not on AC.  Was on warfarin for years, but switched to eliquis.  Eliquis recently stopped d/t inability to tolerate PO.  Will need to be restarted on eliquis after EGD/surgical intreventions.    Patient does not know dosing.   - IVC filter currently in place  - Currently in hypercoagulable state  - 4/11 - [facility]um[facility]: Continue treatment with eliquis after intervention with swallowing difficulty.     Dehydration  -2/2 esophageal obstruction  - IVF replacement  - 4/11 - [facility]um[facility]: Continue IV hydration     Chronic Bilateral edema of lower extremity  - BNP 65 in setting of obesity  - will order 2D ECHO  - no erythema, swelling appears equal     Weakness  - PT/OT consult  - 4/11 - [facility]: PT/OT consult when ready from intervention of esophageal spasm and gastric cancer     Hypertension  - Stable  - Continue losartan 50 mg PO daily, cardizem 60 mg PO TID when able to tolerate PO    DVT PPx: enoxaparin    Discharge plan and follow up    Provider    I personally scribed for Levi Villasenor MD on 04/17/2018 at 2:16 PM. Electronically signed by konstantin Eden on 04/17/2018 at 2:16 PM.

## 2018-04-17 NOTE — PHYSICIAN QUERY
PT Name: Elbert Connelly  MR #: [id number]    Physician Query Form - Nutrition Clarification     CDS/: Paige Hairston RN, CCDS               Contact information:  [email or web address]        This form is a permanent document in the medical record.     Query Date: April 17, 2018    By submitting this query, we are merely seeking further clarification of documentation.. Please utilize your independent clinical judgment when addressing the question(s) below.    The Medical record contains the following:   Indicators  Supporting Clinical Findings Location in Medical Record   X % of Estimated Energy Intake over a time frame from p.o., TF, or TPN Energy Intake: <50% of estimated energy requirement for 4 months Nutrition progress note 04/16   X Weight Status over a time frame Weight Loss: 33% x 4 months  Nutrition progress note 04/16    Subcutaneous Fat and/or Muscle Loss      Fluid Accumulation or Edema      Reduced  Strength     X Wt / BMI / Usual Body Weight BMI (Calculated): 30.7 Nutrition progress note 04/16    Delayed Wound Healing / Failure to Thrive     X Acute or Chronic Illness Esophageal obstruction  Esophageal spasm  Gastric carcnoma  Dysphagia x3 months with 4 EGDs without relief of symptoms.  Biopsies taken during previous EGD showed gastric carcinoma.     The patient states he has had trouble swallowing both liquids and solids since January of this year.  [facility] which was unable to traverse the esophagus.  Recently underwent an EGD on 4/6/18 which showed a dilated proximal esophagus with a distal ring/stricture, diffuse gastric edema/erythema with biopsies positive for gastric adenocarcinoma with infiltration of the lamina propria.      Gastric carcinoma  Recent biopsies positive for adenocarcinoma of the gastric body.  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 [facility] 04/11            Gastroenterology consult  04/11              Gastroenterology consult 04/11    Medication     X Treatment -4/17: Started TPN today 20ml/hr.  Hospital Medicine progress note 04/17 2:47 PM   X Other Protein-calorie malnutrition [ Hospital Medicine progress note 04/16     AND / [facility] Clinical Characteristics (October 2011)  A minimum of two characteristics is recommended for diagnosing either moderate or severe malnutrition   Mild Malnutrition Moderate Malnutrition Severe Malnutrition   Energy Intake from p.o., TF or TPN. < 75% intake of estimated energy needs for less than 7 days < 75% intake of estimated energy needs for greater than 7 days < 50% intake of estimated energy needs for > 5 days   Weight Loss 1-2% in 1 month  5% in 3 months  7.5% in 6 months  10% in 1 year 1-2 % in 1 week  5% in 1 month  7.5% in 3 months  10% in 6 months  20% in 1 year > 2% in 1 week  > 5% in 1 month  > 7.5% in 3 months  > 10% in 6 months  > 20% in 1 year   Physical Findings     None *Mild subcutaneous fat and/or muscle loss  *Mild fluid accumulation  *Stage II decubitus  *Surgical wound or non-healing wound *Mod/severe subcutaneous fat and/or muscle loss  *Mod/severe fluid accumulation  *Stage III or IV decubitus  *Non-healing surgical wound     Provider, please specify diagnosis or diagnoses associated with above clinical findings.    [ ] Mild Protein-Calorie Malnutrition  [ ] Moderate Protein-Calorie Malnutrition  [x ] Severe Protein-Calorie Malnutrition  [ ] Other Nutritional Diagnosis (please specify): ____________________________________  [ ] Other: ________________________________  [ ] Clinically Undetermined    Please document in your progress notes daily for the duration of treatment until resolved and include in your discharge summary.

## 2018-04-17 NOTE — TREATMENT PLAN
AES Note    Case reviewed with attending.  EUS for staging is essentially an elective/outpatient procedure.  Patient  coded and received chest compressions during last attempt at EUS.  Patient also had recurrence of AFib w/RVR Sunday night/Monday AM.      We recommend medically and nutritionally optimizing this patient and we will reattempt the EUS as an outpatient in 2 weeks.      Please call with any questions.      Rene Andrea   Gastroenterology Fellow PGY VI  [phone]

## 2018-04-17 NOTE — PROGRESS NOTES
Hospital Medicine  Progress note     Team: [facility] [place] [facility] MED B Dr. Villasenor  Admit Date: 4/10/2018  LIO 4/20/2018  Code status: Full Code     Principal Problem:  Atrial fibrillation     Interval hx:    4/11: H&P note  4/12 - Chaz: U/S endoscopic upper cancelled for cardiovascular complication, SR converted to ST after intubation with hypotension. Given phenylephrine 100mcg.   4/13: Talk to GI about EGD. General surgery states most likely will require neoadjuvant therapy prior to surgical intervention.    4/14: LLE erythema and warmth medially. Started on clindamycin 600mg q8hrs. Scheduled for EGD Monday. Consider feeding tube early next week, possibly starting TPN today.   4/15: EGD Monday. Placed PICC line order overnight. Possibly start TPN after EGD, consider feeding tube next week.   4/16: Had a run of Afib with RVR last night. Controlled with lopressor and one dose digoxin. EGD held until patient more hemodynamically stable. PICC line placement today to start TPN. Replaced K and Mg.  4/17: Start TPN today 20ml/hr. Started morphine IV PRN for pain relief. Heme/Onc consult. AES rec: EGD outpatient in 2 weeks once medically optimized. Replaced K and Mg. Heme/Onc consult Nephrology rec: CMV testing ordered for leukopenia, cylex ordered and pending.      ROS      Constitutional: Increased Fatigue  Pain Scale: 0 /10  Respiratory: no cough or shortness of breath  Cardiovascular: no chest pain or palpitations  Gastrointestinal: Nausea. No vomiting, change in bowel habits. Abdominal pain last night.   Behavioral/Psych: no depression or anxiety        PEx  Temp:  [97.6 °F (36.4 °C)-98.4 °F (36.9 °C)]   Pulse:  [57-81]   Resp:  [16]   BP: (129-162)/(73-89)   SpO2:  [95 %-100 %]      Intake/Output Summary (Last 24 hours) at 04/17/18 1008  Last data filed at 04/17/18 0500    Gross per 24 hour   Intake             1526 ml   Output             1250 ml   Net              276 ml         General Appearance: no acute distress    Heart: regular rate and rhythm  Respiratory: Normal respiratory effort, no crackles   Abdomen: Soft, non-tender; bowel sounds active  Skin: intact. IV sites ok.  LLE erythema and warmth medially  Neurologic:  No focal numbness or weakness  Mental status: Alert, oriented x 4, affect appropriate         Recent Labs  Lab 04/15/18  0543 04/16/18  0552 04/17/18  0608   WBC 4.02 3.48* 3.62*   HGB 9.5* 10.1* 9.9*   HCT 28.8* 29.8* 29.0*    164 151         Recent Labs  Lab 04/10/18  2005   04/15/18  0543 04/15/18  2042 04/16/18  0552 04/17/18  0608     < > 138 138 139 138   K 3.8  < > 3.7 3.8 3.1* 3.0*   CL 98  < > 102 100 101 99   CO2 24  < > 25 29 25 32*   BUN 14  < > 11 7* 5* 5*   CREATININE 0.9  < > 0.7 0.7 0.6 0.6   *  < > 104 125* 112* 139*   CALCIUM 9.0  < > 8.4* 8.4* 7.9* 8.0*   MG  --   < >  --  1.6 1.5* 1.5*   PHOS  --   < > 2.3* 3.3 3.0  --    LIPASE 43  --   --   --   --   --    AMYLASE 37  --   --   --   --   --    < > = values in this interval not displayed.     Recent Labs  Lab 04/10/18  2005   ALKPHOS 47*   ALT 16   AST 25   ALBUMIN 2.9*   PROT 6.4   BILITOT 1.5*   INR 1.1         Recent Labs  Lab 04/10/18  2357 04/12/18  2154 04/13/18  0741 04/15/18  1900   POCTGLUCOSE 112* 113* 112* 118*      No results for input(s): CPK, CPKMB, MB, TROPONINI in the last 72 hours.     Scheduled Meds:   azaTHIOprine  150 mg Oral Daily    clindamycin (CLEOCIN) IVPB  600 mg Intravenous Q8H    magnesium sulfate IVPB  2 g Intravenous Once    methylPREDNISolone sodium succinate  10 mg Intravenous Daily    pantoprazole 40 mg in dextrose 5 % 100 mL infusion (ready to mix system)  40 mg Intravenous BID    potassium chloride  10 mEq Intravenous Q1H    sodium chloride 0.9%  10 mL Intravenous Q6H      Continuous Infusions:   Amino acid 5% - dextrose 20% (CLINIMIX-E) solution with additives (1L provides 50 gm AA, 200 gm CHO (680 kcal/L dextrose), Na 35, K 30, Mg 5, Ca 4.5, Acetate 80, Cl 39, Phos 15) 20  mL/hr at 04/16/18 1917    dextrose 5% lactated ringers 50 mL/hr at 04/17/18 0847      As Needed:  acetaminophen, albuterol-ipratropium 2.5mg-0.5mg/3mL, bisacodyl, dextrose 50%, dextrose 50%, glucagon (human recombinant), glucose, glucose, hydrALAZINE, metoprolol, morphine, omnipaque, ondansetron, promethazine (PHENERGAN) IVPB, senna-docusate 8.6-50 mg, Flushing PICC Protocol **AND** sodium chloride 0.9% **AND** sodium chloride 0.9%, sodium chloride 0.9%            Active Hospital Problems     Diagnosis   POA    *Atrial fibrillation [I48.91]   Unknown    Protein-calorie malnutrition [E46]   Yes    Hypophosphatemia [E83.39]   Yes    Gastric carcinoma [C16.9]   Unknown    Weakness [R53.1]   Unknown    Esophageal spasm [K22.4]   Unknown    History of DVT (deep vein thrombosis) [Z86.718]   Not Applicable    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 May change pred to SM and hold  Aza for short period if unable to swallow.  -Follow with strict I/Os, daily weights, BP (goal <140/90), daily labs.    -Check immuknow cylex to better assess net IS.       Hypokalemia [E87.6]   Yes    Hypomagnesemia [E83.42]   Yes    Nonrheumatic aortic valve disorder [I35.9]   Unknown    Esophageal obstruction [K22.2]   Yes    Bilateral edema of lower extremity [R60.0]   Unknown    Dehydration [E86.0]   Unknown       Resolved Hospital Problems     Diagnosis Date Resolved POA   No resolved problems to display.         Overview: Mr. Elbert Connelly is a 67 y.o. male with medical problems including HTN, DVT/PE (IVC filter), renal disorder, and history of kidney transplant who presents with complaint of abdominal pain, fatigue, weakness, dizziness and esophageal obstruction.          Assessment and Plan for Problems addressed today:     Esophageal obstruction  Esophageal spasm  Gastric carcnoma  - AES consult for EGD/EUS for gastric cancer  - Surgical oncology consult and appreciate recs  - Will need documentation from OSH  - Unable to tolerate saliva but protecting airway.  - Strict NPO, aspiration precautions  - Not currently on anticoagulation- will need to be restarted on this admission  - LR mIVF x12 hours  - 4/11 - [facility]: NPO for procedure/surgery and aspiration concern, IV hydration and trend labs for electrolytes . CT thorax/abdomen/pelvis w/IV contrast for staging  - EUS tomorrow pending CT results  - SLP consult for recs re: meds/diet   - Surg Onc consultation - paged 12 noon  - 4/12: U/S endoscopy upper cancelled for cardiovascular reasons  - 4/13: follow up with GI about EGD  - 4/14: Scheduled for EGD Monday. Consider feeding tube early next week, possibly starting TPN today.   - 4/15: EGD Monday. PICC line order placed overnight. Consider starting TPN after EGD, and consider feeding tube early next week.   -4/16: EGD held due to afib with RVR overnight. PICC line placement today to start TPN.  dynamically stable. PICC line placement today to start TPN. Replaced K and Mg.  -4/17: Started TPN today 20ml/hr. Started morphine IV PRN for pain relief. AES rec: EGD outpatient in 2 weeks once medically optimized. Heme/Onc Consult.     Cellulitis LLE  -LLE erythema and warmth medially. Started on clindamycin 600mg q8hrs on 4/13.      Hypomagnesemia  -1.3 (4/11)-->1.6 (4/13) monitor  -4/16 Mg 1.5-replaced  -4/17 1.5-replaced     Hypokalemia  -4/16 K-3.1-replaced  -4/17 3.0-replaced     Atrial Fibrillation  -Rate controlled  -Cardiology consulted: repeat ECHO when deemed appropriate,   -OSH records show pre-existing LBBB, no further evaluation needed.  -4/16: run of afib with RVR overnight, controlled with lopressor and one dose digoxin.     CKD Stage II (mild)  S/P kidney transplant  (Stable)  - One functioning transplanted kidney  - KTM consult  - Avoid nephrotoxic agents, including contrast  - will defer abdominal imaging decision for cancer to KTM/surg-onc team  - Cr. 0.9, lytes WNL  - Mag/Phos   - Will change prednisone 10 mg PO daily to soludmedrol 10 mg IV daily while unable to tolerate PO  - Wife able to give patient imuran 150 mg PO daily crushed up in pudding at home.  No IV available, per pharmacy. Continue PO Imuran as long as patient can tolerate.  - 4/11 - maumus: Stable functioning kidney Cr .7 BUN 14, continue PO Imuran as long as tolerated.   -4/17 Per nephrology: cylex ordered and pending, CMV ordered for leukopenia     History of DVT (deep vein thrombosis)  H/o DVT and PE  - Currently not on AC.  Was on warfarin for years, but switched to eliquis.  Eliquis recently stopped d/t inability to tolerate PO.  Will need to be restarted on eliquis after EGD/surgical intreventions.    Patient does not know dosing.   - IVC filter currently in place  - Currently in hypercoagulable state  - 4/11 - [facility]: Continue treatment with eliquis after intervention with swallowing difficulty.      Dehydration  -2/2 esophageal obstruction  - IVF replacement  - 4/11 - [facility]: Continue IV hydration     Chronic Bilateral edema of lower extremity  - BNP 65 in setting of obesity  - will order 2D ECHO  - no erythema, swelling appears equal     Weakness  - PT/OT consult  - 4/11 - [facility]: PT/OT consult when ready from intervention of esophageal spasm and gastric cancer     Hypertension  - Stable  - Continue losartan 50 mg PO daily, cardizem 60 mg PO TID when able to tolerate PO    Anemia   Hb 9.9. stable. monitor     DVT PPx: enoxaparin     Discharge plan and follow up     Provider     I personally scribed for Levi Villasenor MD on 04/17/2018 at 2:16 PM. Electronically signed by konstantin Eden on 04/17/2018 at 2:16 PM.     The documentation recorded by the scribe accurately reflects service I  personally performed and the decisions made by me.  Levi Villasenor MD  Attending Staff Physician  [facility] Medicine  pager- [phone]  [employer] - [phone]

## 2018-04-17 NOTE — SUBJECTIVE & OBJECTIVE
"  Subjective:   History of Present Illness:  Elbert is a 68 yo WM s/p living donor kidney transplant from sister in 1982 (Dr. Sow at [facility] [place], placed in [facility]) admitted for N/V and newly diagnosed gastric cancer for multidisciplinary GI and surgical oncology eval.  He reports no issues of rejection or other txp complications. He has been maintained on prednisone 10 mg daily and azathioprine 150 mg daily. (He reports taking cyclosporine for only a short period after txp).  He states he has been able to keep his IS down by crushing meds in applesauce, but cannot eat much; wife states liquids stay down for a few minutes before he vomits it back up. From kidney standpoint, he reports no past  issues except remote UTI "years ago," but he started with urinary incontinence in very recent past. He denies pain over allograft, frequency or urgency.    He states his current illness began in January when he started with vomiting. He states he has lost 60#s since then, which he attributes to difficulty tolerating PO intake. He notes he has been in an out of the hospital multiple times over the past 3 months for similar issue.  Last week he states and EGD w/ biopsies showed stomach cancer.        Hospital Course:  No notes on file    Interval History:  -pt now receiving TPN;  -pt is interested in palliative care consult; has questions about future surgeries/chemo tx plan      Past Medical, Surgical, Family, and Social History:   Unchanged from H&P.    Scheduled Meds:   azaTHIOprine  150 mg Oral Daily    clindamycin (CLEOCIN) IVPB  600 mg Intravenous Q8H    methylPREDNISolone sodium succinate  10 mg Intravenous Daily    miconazole   Topical (Top) BID    morphine  2 mg Intravenous Once    pantoprazole 40 mg in dextrose 5 % 100 mL infusion (ready to mix system)  40 mg Intravenous BID    potassium chloride  10 mEq Intravenous Q1H    sodium chloride 0.9%  10 mL Intravenous Q6H     Continuous Infusions:   Amino acid 5% - " "dextrose 20% (CLINIMIX-E) solution with additives (1L provides 50 gm AA, 200 gm CHO (680 kcal/L dextrose), Na 35, K 30, Mg 5, Ca 4.5, Acetate 80, Cl 39, Phos 15) 20 mL/hr at 04/16/18 1917    dextrose 5% lactated ringers 50 mL/hr at 04/17/18 0847     PRN Meds:acetaminophen, albuterol-ipratropium 2.5mg-0.5mg/3mL, bisacodyl, dextrose 50%, dextrose 50%, glucagon (human recombinant), glucose, glucose, hydrALAZINE, metoprolol, morphine, omnipaque, ondansetron, promethazine (PHENERGAN) IVPB, senna-docusate 8.6-50 mg, Flushing PICC Protocol **AND** sodium chloride 0.9% **AND** sodium chloride 0.9%, sodium chloride 0.9%    Intake/Output - Last 3 Shifts       04/15 0700 - 04/16 0659 04/16 0700 - 04/17 0659 04/17 0700 - 04/18 0659    P.O. 0 0     I.V. (mL/kg) 1993.8 (22.5) 1503.3 (16.9)     IV Piggyback 100 300     TPN  72.7     Total Intake(mL/kg) 2093.8 (23.6) 1876 (21.1)     Urine (mL/kg/hr) 1800 (0.8) 1900 (0.9)     Stool 0 (0)      Total Output 1800 1900      Net +293.8 -24             Stool Occurrence 0 x             Review of Systems   Neg except as above  Objective:     Vital Signs (Most Recent):  Temp: 97.4 °F (36.3 °C) (04/17/18 1208)  Pulse: 70 (04/17/18 1208)  Resp: 18 (04/17/18 1208)  BP: 127/77 (04/17/18 1208)  SpO2: 96 % (04/17/18 1208) Vital Signs (24h Range):  Temp:  [97.4 °F (36.3 °C)-98.4 °F (36.9 °C)] 97.4 °F (36.3 °C)  Pulse:  [57-97] 70  Resp:  [16-18] 18  SpO2:  [96 %-100 %] 96 %  BP: (127-162)/(73-89) 127/77     Weight: 88.8 kg (195 lb 12.3 oz)  Height: 5' 7" (170.2 cm)  Body mass index is 30.66 kg/m².    Physical Exam  GEN - AAOx4, NAd  CHEST  -CTA B anterior auscultation  ABD - soft; nonttp  EXTR  -warm; 2+ pitting edema to thighs      "

## 2018-04-17 NOTE — ASSESSMENT & PLAN NOTE
-stable  -CKD2 s/p kidney transplantation  remains stable Continue to monitor renal function and electrolytes, HTN, secondary hyperparathyroidism and other issues related to underlying ESRD.

## 2018-04-17 NOTE — PT/OT/SLP PROGRESS
Occupational Therapy      Patient Name:  Elbert Connelly   MRN:  [id number]    Patient not seen today secondary to patient unwilling to participate. Will follow-up as scheduled.    FRANCISCO Perez  4/17/2018

## 2018-04-17 NOTE — PROGRESS NOTES
"[facility]  Kidney Transplant  Progress Note      Reason for Follow-up: Reassessment of  recipient and management of immunosuppression.    ORGAN:     Donor Type:           Subjective:   History of Present Illness:  Elbert is a 66 yo WM s/p living donor kidney transplant from sister in 1982 (Dr. Sow at [facility] [place], placed in [facility]) admitted for N/V and newly diagnosed gastric cancer for multidisciplinary GI and surgical oncology eval.  He reports no issues of rejection or other txp complications. He has been maintained on prednisone 10 mg daily and azathioprine 150 mg daily. (He reports taking cyclosporine for only a short period after txp).  He states he has been able to keep his IS down by crushing meds in applesauce, but cannot eat much; wife states liquids stay down for a few minutes before he vomits it back up. From kidney standpoint, he reports no past  issues except remote UTI "years ago," but he started with urinary incontinence in very recent past. He denies pain over allograft, frequency or urgency.    He states his current illness began in January when he started with vomiting. He states he has lost 60#s since then, which he attributes to difficulty tolerating PO intake. He notes he has been in an out of the hospital multiple times over the past 3 months for similar issue.  Last week he states and EGD w/ biopsies showed stomach cancer.        Hospital Course:  No notes on file    Interval History:  -pt now receiving TPN;  -pt is interested in palliative care consult; has questions about future surgeries/chemo tx plan      Past Medical, Surgical, Family, and Social History:   Unchanged from H&P.    Scheduled Meds:   azaTHIOprine  150 mg Oral Daily    clindamycin (CLEOCIN) IVPB  600 mg Intravenous Q8H    methylPREDNISolone sodium succinate  10 mg Intravenous Daily    miconazole   Topical (Top) BID    morphine  2 mg Intravenous Once    pantoprazole 40 mg in dextrose 5 % 100 mL infusion " "(ready to mix system)  40 mg Intravenous BID    potassium chloride  10 mEq Intravenous Q1H    sodium chloride 0.9%  10 mL Intravenous Q6H     Continuous Infusions:   Amino acid 5% - dextrose 20% (CLINIMIX-E) solution with additives (1L provides 50 gm AA, 200 gm CHO (680 kcal/L dextrose), Na 35, K 30, Mg 5, Ca 4.5, Acetate 80, Cl 39, Phos 15) 20 mL/hr at 04/16/18 1917    dextrose 5% lactated ringers 50 mL/hr at 04/17/18 0847     PRN Meds:acetaminophen, albuterol-ipratropium 2.5mg-0.5mg/3mL, bisacodyl, dextrose 50%, dextrose 50%, glucagon (human recombinant), glucose, glucose, hydrALAZINE, metoprolol, morphine, omnipaque, ondansetron, promethazine (PHENERGAN) IVPB, senna-docusate 8.6-50 mg, Flushing PICC Protocol **AND** sodium chloride 0.9% **AND** sodium chloride 0.9%, sodium chloride 0.9%    Intake/Output - Last 3 Shifts       04/15 0700 - 04/16 0659 04/16 0700 - 04/17 0659 04/17 0700 - 04/18 0659    P.O. 0 0     I.V. (mL/kg) 1993.8 (22.5) 1503.3 (16.9)     IV Piggyback 100 300     TPN  72.7     Total Intake(mL/kg) 2093.8 (23.6) 1876 (21.1)     Urine (mL/kg/hr) 1800 (0.8) 1900 (0.9)     Stool 0 (0)      Total Output 1800 1900      Net +293.8 -24             Stool Occurrence 0 x             Review of Systems   Neg except as above  Objective:     Vital Signs (Most Recent):  Temp: 97.4 °F (36.3 °C) (04/17/18 1208)  Pulse: 70 (04/17/18 1208)  Resp: 18 (04/17/18 1208)  BP: 127/77 (04/17/18 1208)  SpO2: 96 % (04/17/18 1208) Vital Signs (24h Range):  Temp:  [97.4 °F (36.3 °C)-98.4 °F (36.9 °C)] 97.4 °F (36.3 °C)  Pulse:  [57-97] 70  Resp:  [16-18] 18  SpO2:  [96 %-100 %] 96 %  BP: (127-162)/(73-89) 127/77     Weight: 88.8 kg (195 lb 12.3 oz)  Height: 5' 7" (170.2 cm)  Body mass index is 30.66 kg/m².    Physical Exam  GEN - AAOx4, NAd  CHEST  -CTA B anterior auscultation  ABD - soft; nonttp  EXTR  -warm; 2+ pitting edema to thighs        Assessment/Plan:     * Atrial fibrillation    -Back in sinus rhythm  -Hypomag and " hypoK will potentially aggravate cardiac arrhythmias, and need close attention and aggressive repletion. Monitor K/Mg daily and replace IV prn        Hypophosphatemia    -rec daily levels and increase PO4 in TPN        Hypomagnesemia    -rec daily levels and increase Mg in TPN          Hypokalemia    -rec daily levels and increase K in TPN          Prophylactic immunotherapy      -Cont IV prednisone; po azathioprine  -Cylex ordered and pending; CMV ordered for leukopenia        Chronic kidney disease (CKD), stage II (mild)    -stable  -CKD2 s/p kidney transplantation  remains stable Continue to monitor renal function and electrolytes, HTN, secondary hyperparathyroidism and other issues related to underlying ESRD.             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  -TPN started for nutrition          Bilateral edema of lower extremity    -Hypoalbuminemia and immobility contributing factors.              Discharge Planning:      Elbert Joe II, MD  Kidney Transplant  [facility]

## 2018-04-17 NOTE — ASSESSMENT & PLAN NOTE
-gen surgery suggesting neoadjuvant therapy with prehab prior to surgical intervention.  -TPN started for nutrition

## 2018-04-17 NOTE — PLAN OF CARE
Problem: Patient Care Overview  Goal: Plan of Care Review  Plan of care discussed with patient.  Fall precautions in place. Patient has no complaints of pain. Discussed medications and care. Clinimex and LR gtt continued. Pt complained of N&V. Administered PRN zofran and PRN phenergan. Patient has no questions at this time.White board updated. Bed locked in lowest position. Side rails up x2. Will continue to monitor.

## 2018-04-17 NOTE — PT/OT/SLP PROGRESS
Speech Language Pathology      Elbert Connelly  MRN: [id number]    Patient not seen today secondary to Other (Comment). Orders for swallow treatment have not been signed. Second sign was resent & team was paged. Will follow-up as scheduled awaiting signed orders.     Laura Courtney, CCC-SLP   4/17/2018

## 2018-04-17 NOTE — PLAN OF CARE
Problem: Physical Therapy Goal  Goal: Physical Therapy Goal  Goals to be met by: 2018     Patient will increase functional independence with mobility by performin. Supine to sit with Contact Guard Assistance.  2. Rolling to Left and Right with Stand-by Assistance.  3. Sit to stand transfer with Contact Guard Assistance with appropriate AD.  4. Bed to chair transfer with Contact Guard Assistance with appropriate AD.  5. Gait  x 100 feet with Stand-by Assistance with appropriate AD.  6. Lower extremity exercise program x 20 reps per handout, with supervision.     Outcome: Ongoing (interventions implemented as appropriate)  LTGs remain appropriate. Pt will continue PT POC.    Mary Ellen Ramirez, PT  2018

## 2018-04-18 PROBLEM — D69.6 THROMBOCYTOPENIA, UNSPECIFIED: Status: ACTIVE | Noted: 2018-04-18

## 2018-04-18 LAB
ANION GAP SERPL CALC-SCNC: 7 MMOL/L
ANION GAP SERPL CALC-SCNC: 9 MMOL/L
BASOPHILS # BLD AUTO: 0.01 K/UL
BASOPHILS NFR BLD: 0.3 %
BUN SERPL-MCNC: 5 MG/DL
BUN SERPL-MCNC: 5 MG/DL
CALCIUM SERPL-MCNC: 8.1 MG/DL
CALCIUM SERPL-MCNC: 8.5 MG/DL
CHLORIDE SERPL-SCNC: 100 MMOL/L
CHLORIDE SERPL-SCNC: 99 MMOL/L
CO2 SERPL-SCNC: 29 MMOL/L
CO2 SERPL-SCNC: 30 MMOL/L
CREAT SERPL-MCNC: 0.7 MG/DL
CREAT SERPL-MCNC: 0.7 MG/DL
DIFFERENTIAL METHOD: ABNORMAL
EOSINOPHIL # BLD AUTO: 0.1 K/UL
EOSINOPHIL NFR BLD: 2.8 %
ERYTHROCYTE [DISTWIDTH] IN BLOOD BY AUTOMATED COUNT: 17.9 %
EST. GFR  (AFRICAN AMERICAN): >60 ML/MIN/1.73 M^2
EST. GFR  (AFRICAN AMERICAN): >60 ML/MIN/1.73 M^2
EST. GFR  (NON AFRICAN AMERICAN): >60 ML/MIN/1.73 M^2
EST. GFR  (NON AFRICAN AMERICAN): >60 ML/MIN/1.73 M^2
GLUCOSE SERPL-MCNC: 131 MG/DL
GLUCOSE SERPL-MCNC: 451 MG/DL
HCT VFR BLD AUTO: 27.8 %
HGB BLD-MCNC: 9.3 G/DL
IMM GRANULOCYTES # BLD AUTO: 0.03 K/UL
IMM GRANULOCYTES NFR BLD AUTO: 0.8 %
LYMPHOCYTES # BLD AUTO: 1 K/UL
LYMPHOCYTES NFR BLD: 27.5 %
MAGNESIUM SERPL-MCNC: 1.6 MG/DL
MCH RBC QN AUTO: 32.3 PG
MCHC RBC AUTO-ENTMCNC: 33.5 G/DL
MCV RBC AUTO: 97 FL
MONOCYTES # BLD AUTO: 0.1 K/UL
MONOCYTES NFR BLD: 2.5 %
NEUTROPHILS # BLD AUTO: 2.4 K/UL
NEUTROPHILS NFR BLD: 66.1 %
NRBC BLD-RTO: 1 /100 WBC
PHOSPHATE SERPL-MCNC: 3.5 MG/DL
PLATELET # BLD AUTO: 143 K/UL
PMV BLD AUTO: 9.5 FL
POCT GLUCOSE: 136 MG/DL (ref 70–110)
POTASSIUM SERPL-SCNC: 3.4 MMOL/L
POTASSIUM SERPL-SCNC: 3.8 MMOL/L
RBC # BLD AUTO: 2.88 M/UL
SODIUM SERPL-SCNC: 137 MMOL/L
SODIUM SERPL-SCNC: 137 MMOL/L
WBC # BLD AUTO: 3.63 K/UL

## 2018-04-18 PROCEDURE — 80048 BASIC METABOLIC PNL TOTAL CA: CPT | Mod: 91

## 2018-04-18 PROCEDURE — 25000003 PHARM REV CODE 250: Performed by: PHYSICIAN ASSISTANT

## 2018-04-18 PROCEDURE — 84100 ASSAY OF PHOSPHORUS: CPT

## 2018-04-18 PROCEDURE — 80048 BASIC METABOLIC PNL TOTAL CA: CPT

## 2018-04-18 PROCEDURE — 99233 SBSQ HOSP IP/OBS HIGH 50: CPT | Mod: ,,, | Performed by: HOSPITALIST

## 2018-04-18 PROCEDURE — 99222 1ST HOSP IP/OBS MODERATE 55: CPT | Mod: GC,,, | Performed by: INTERNAL MEDICINE

## 2018-04-18 PROCEDURE — 63600175 PHARM REV CODE 636 W HCPCS: Performed by: HOSPITALIST

## 2018-04-18 PROCEDURE — 63600175 PHARM REV CODE 636 W HCPCS: Performed by: PHYSICIAN ASSISTANT

## 2018-04-18 PROCEDURE — 25000003 PHARM REV CODE 250: Performed by: HOSPITALIST

## 2018-04-18 PROCEDURE — 25000003 PHARM REV CODE 250: Performed by: INTERNAL MEDICINE

## 2018-04-18 PROCEDURE — 20600001 HC STEP DOWN PRIVATE ROOM

## 2018-04-18 PROCEDURE — C9113 INJ PANTOPRAZOLE SODIUM, VIA: HCPCS | Performed by: PHYSICIAN ASSISTANT

## 2018-04-18 PROCEDURE — 83735 ASSAY OF MAGNESIUM: CPT

## 2018-04-18 PROCEDURE — A4216 STERILE WATER/SALINE, 10 ML: HCPCS | Performed by: INTERNAL MEDICINE

## 2018-04-18 PROCEDURE — S0077 INJECTION, CLINDAMYCIN PHOSP: HCPCS | Performed by: PHYSICIAN ASSISTANT

## 2018-04-18 PROCEDURE — 36415 COLL VENOUS BLD VENIPUNCTURE: CPT

## 2018-04-18 PROCEDURE — 85025 COMPLETE CBC W/AUTO DIFF WBC: CPT

## 2018-04-18 RX ORDER — POTASSIUM CHLORIDE 7.45 MG/ML
10 INJECTION INTRAVENOUS
Status: DISPENSED | OUTPATIENT
Start: 2018-04-18 | End: 2018-04-18

## 2018-04-18 RX ORDER — POTASSIUM CHLORIDE 7.45 MG/ML
10 INJECTION INTRAVENOUS ONCE
Status: COMPLETED | OUTPATIENT
Start: 2018-04-18 | End: 2018-04-18

## 2018-04-18 RX ORDER — MAGNESIUM SULFATE HEPTAHYDRATE 40 MG/ML
2 INJECTION, SOLUTION INTRAVENOUS ONCE
Status: COMPLETED | OUTPATIENT
Start: 2018-04-18 | End: 2018-04-18

## 2018-04-18 RX ADMIN — THIAMINE HYDROCHLORIDE 100 MG: 100 INJECTION, SOLUTION INTRAMUSCULAR; INTRAVENOUS at 09:04

## 2018-04-18 RX ADMIN — POTASSIUM CHLORIDE 10 MEQ: 7.46 INJECTION, SOLUTION INTRAVENOUS at 11:04

## 2018-04-18 RX ADMIN — MICONAZOLE NITRATE: 20 CREAM TOPICAL at 09:04

## 2018-04-18 RX ADMIN — Medication 10 ML: at 05:04

## 2018-04-18 RX ADMIN — DEXTROSE 40 MG: 50 INJECTION, SOLUTION INTRAVENOUS at 09:04

## 2018-04-18 RX ADMIN — POTASSIUM CHLORIDE 10 MEQ: 7.46 INJECTION, SOLUTION INTRAVENOUS at 05:04

## 2018-04-18 RX ADMIN — Medication 10 ML: at 12:04

## 2018-04-18 RX ADMIN — DEXTROSE 40 MG: 50 INJECTION, SOLUTION INTRAVENOUS at 10:04

## 2018-04-18 RX ADMIN — POTASSIUM CHLORIDE 10 MEQ: 7.46 INJECTION, SOLUTION INTRAVENOUS at 04:04

## 2018-04-18 RX ADMIN — AZATHIOPRINE 150 MG: 50 TABLET ORAL at 09:04

## 2018-04-18 RX ADMIN — ONDANSETRON 4 MG: 4 TABLET, ORALLY DISINTEGRATING ORAL at 06:04

## 2018-04-18 RX ADMIN — METHYLPREDNISOLONE SODIUM SUCCINATE 10 MG: 40 INJECTION, POWDER, FOR SOLUTION INTRAMUSCULAR; INTRAVENOUS at 09:04

## 2018-04-18 RX ADMIN — ENOXAPARIN SODIUM 90 MG: 100 INJECTION SUBCUTANEOUS at 10:04

## 2018-04-18 RX ADMIN — ENOXAPARIN SODIUM 90 MG: 100 INJECTION SUBCUTANEOUS at 09:04

## 2018-04-18 RX ADMIN — Medication 10 ML: at 06:04

## 2018-04-18 RX ADMIN — CLINDAMYCIN IN 5 PERCENT DEXTROSE 600 MG: 12 INJECTION, SOLUTION INTRAVENOUS at 05:04

## 2018-04-18 RX ADMIN — ONDANSETRON 4 MG: 4 TABLET, ORALLY DISINTEGRATING ORAL at 10:04

## 2018-04-18 RX ADMIN — MICONAZOLE NITRATE: 20 CREAM TOPICAL at 10:04

## 2018-04-18 RX ADMIN — POTASSIUM CHLORIDE 10 MEQ: 7.46 INJECTION, SOLUTION INTRAVENOUS at 01:04

## 2018-04-18 RX ADMIN — MAGNESIUM SULFATE IN WATER 2 G: 40 INJECTION, SOLUTION INTRAVENOUS at 09:04

## 2018-04-18 NOTE — PROGRESS NOTES
Hospital Medicine  Progress note     Team: [facility]OS[facility] MED B Dr. Villasenor  Admit Date: 4/10/2018  LIO 4/24/2018  Code status: Full Code     Principal Problem:  Atrial fibrillation     Interval hx:    4/11: H&P note  4/12 - Chaz: U/S endoscopic upper cancelled for cardiovascular complication, SR converted to ST after intubation with hypotension. Given phenylephrine 100mcg.   4/13: Talk to GI about EGD. General surgery states most likely will require neoadjuvant therapy prior to surgical intervention.    4/14: LLE erythema and warmth medially. Started on clindamycin 600mg q8hrs. Scheduled for EGD Monday. Consider feeding tube early next week, possibly starting TPN today.   4/15: EGD Monday. Placed PICC line order overnight. Possibly start TPN after EGD, consider feeding tube next week.   4/16: Had a run of Afib with RVR last night. Controlled with lopressor and one dose digoxin. EGD held until patient more hemodynamically stable. PICC line placement today to start TPN. Replaced K and Mg.  4/17: Start TPN today 20ml/hr. Started morphine IV PRN for pain relief. Heme/Onc consult. AES rec: EGD outpatient in 2 weeks once medically optimized. Replaced K and Mg. Heme/Onc consult. Nephrology rec: CMV testing ordered for leukopenia, cylex ordered and pending.   4/18: Heme/Onc recs: not a candidate for chemotherapy due to nutritional status and not a candidate for immunotherapy due to hx of renal transplant. May possibly benefit from J-tube, recommend consulting Surg Onc. K and Mg replaced. TPN to increase to 40ml/hr. Started lovenox for hx DVT.  family considering hospice. Oncology to discuss today regarding prognosis and further plan     ROS      Constitutional: Increased Fatigue  Pain Scale: 4 /10 intermittent abdominal pain.  Respiratory: no cough or shortness of breath  Cardiovascular: no chest pain or palpitations  Gastrointestinal: Nausea. No vomiting, change in bowel habits. Intermittent abdominal pain.    Behavioral/Psych: no depression or anxiety        PEx  Temp:  [96.8 °F (36 °C)-98.3 °F (36.8 °C)]   Pulse:  [58-97]   Resp:  [18-20]   BP: (127-167)/(74-82)   SpO2:  [94 %-100 %]      Intake/Output Summary (Last 24 hours) at 04/18/18 0932  Last data filed at 04/18/18 0638    Gross per 24 hour   Intake              500 ml   Output             1875 ml   Net            -1375 ml         General Appearance: no acute distress   Heart: regular rate and rhythm  Respiratory: Normal respiratory effort, no crackles   Abdomen: Soft, non-tender; bowel sounds active  Skin: intact. IV sites ok.  LLE erythema and warmth medially  Neurologic:  No focal numbness or weakness  Mental status: Alert, oriented x 4, affect appropriate         Recent Labs  Lab 04/16/18  0552 04/17/18  0608 04/18/18  0511   WBC 3.48* 3.62* 3.63*   HGB 10.1* 9.9* 9.3*   HCT 29.8* 29.0* 27.8*    151 143*         Recent Labs  Lab 04/15/18  2042 04/16/18  0552 04/17/18  0608 04/18/18  0511 04/18/18  0620    139 138 137 137   K 3.8 3.1* 3.0* 3.8 3.4*    101 99 99 100   CO2 29 25 32* 29 30*   BUN 7* 5* 5* 5* 5*   CREATININE 0.7 0.6 0.6 0.7 0.7   * 112* 139* 451* 131*   CALCIUM 8.4* 7.9* 8.0* 8.1* 8.5*   MG 1.6 1.5* 1.5* 1.6  --    PHOS 3.3 3.0  --  3.5  --       No results for input(s): ALKPHOS, ALT, AST, ALBUMIN, PROT, BILITOT, INR in the last 168 hours.      Recent Labs  Lab 04/12/18  2154 04/13/18  0741 04/15/18  1900 04/18/18  0608   POCTGLUCOSE 113* 112* 118* 136*      No results for input(s): CPK, CPKMB, MB, TROPONINI in the last 72 hours.     Scheduled Meds:   azaTHIOprine  150 mg Oral Daily    clindamycin (CLEOCIN) IVPB  600 mg Intravenous Q8H    enoxaparin  1 mg/kg Subcutaneous Q12H    magnesium sulfate IVPB  2 g Intravenous Once    methylPREDNISolone sodium succinate  10 mg Intravenous Daily    miconazole   Topical (Top) BID    morphine  2 mg Intravenous Once    pantoprazole 40 mg in dextrose 5 % 100 mL infusion (ready  to mix system)  40 mg Intravenous BID    potassium chloride  10 mEq Intravenous Q1H    sodium chloride 0.9%  10 mL Intravenous Q6H    thiamine (VITAMIN B1) IVPB  100 mg Intravenous Daily      Continuous Infusions:   Amino acid 5% - dextrose 20% (CLINIMIX-E) solution with additives (1L provides 50 gm AA, 200 gm CHO (680 kcal/L dextrose), Na 35, K 30, Mg 5, Ca 4.5, Acetate 80, Cl 39, Phos 15) 20 mL/hr at 04/17/18 2156    dextrose 5% lactated ringers 50 mL/hr at 04/17/18 0847      As Needed:  acetaminophen, albuterol-ipratropium 2.5mg-0.5mg/3mL, bisacodyl, dextrose 50%, dextrose 50%, glucagon (human recombinant), glucose, glucose, hydrALAZINE, metoprolol, morphine, omnipaque, ondansetron, promethazine (PHENERGAN) IVPB, senna-docusate 8.6-50 mg, Flushing PICC Protocol **AND** sodium chloride 0.9% **AND** sodium chloride 0.9%, sodium chloride 0.9%            Active Hospital Problems     Diagnosis   POA    *Atrial fibrillation [I48.91]   Unknown    Protein-calorie malnutrition [E46]   Yes    Hypophosphatemia [E83.39]   Yes    Gastric carcinoma [C16.9]   Unknown    Weakness [R53.1]   Unknown    Esophageal spasm [K22.4]   Unknown    History of DVT (deep vein thrombosis) [Z86.718]   Not Applicable    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 May change pred to SM and hold  Aza for short period if unable to swallow.  -Follow with strict I/Os, daily weights, BP (goal <140/90), daily labs.    -Check immuknow cylex to better assess net IS.       Hypokalemia [E87.6]   Yes    Hypomagnesemia [E83.42]   Yes    Nonrheumatic aortic valve disorder [I35.9]   Unknown    Esophageal obstruction [K22.2]   Yes    Bilateral edema of lower extremity [R60.0]   Unknown     Dehydration [E86.0]   Unknown       Resolved Hospital Problems     Diagnosis Date Resolved POA   No resolved problems to display.         Overview: Mr. Elbert Connelly is a 67 y.o. male with medical problems including HTN, DVT/PE (IVC filter), renal disorder, and history of kidney transplant who presents with complaint of abdominal pain, fatigue, weakness, dizziness and esophageal obstruction.         Assessment and Plan for Problems addressed today:     Esophageal obstruction  Esophageal spasm  Gastric carcnoma  - AES consult for EGD/EUS for gastric cancer  - Surgical oncology consult and appreciate recs  - Will need documentation from OSH  - Unable to tolerate saliva but protecting airway.  - Strict NPO, aspiration precautions  - Not currently on anticoagulation- will need to be restarted on this admission  - LR mIVF x12 hours  - 4/11 - Maumus: NPO for procedure/surgery and aspiration concern, IV hydration and trend labs for electrolytes . CT thorax/abdomen/pelvis w/IV contrast for staging  - EUS tomorrow pending CT results  - SLP consult for recs re: meds/diet   - Surg Onc consultation - paged 12 noon  - 4/12: U/S endoscopy upper cancelled for cardiovascular reasons  - 4/13: follow up with GI about EGD  - 4/14: Scheduled for EGD Monday. Consider feeding tube early next week, possibly starting TPN today.   - 4/15: EGD Monday. PICC line order placed overnight. Consider starting TPN after EGD, and consider feeding tube early next week.   -4/16: EGD held due to afib with RVR overnight. PICC line placement today to start TPN.  dynamically stable. PICC line placement today to start TPN. Replaced K and Mg.  -4/17: Started TPN today 20ml/hr. Started morphine IV PRN for pain relief. AES rec: EGD outpatient in 2 weeks once medically optimized. Heme/Onc Consult.  -4/18: Heme/Onc recs: not a candidate for chemotherapy due to nutritional status and not a candidate for immunotherapy due to hx of renal transplant. May possibly  benefit from J-tube, recommend consulting Surg Onc. TPN to increase to 40ml/hr.       Cellulitis LLE  -LLE erythema and warmth medially. Started on clindamycin 600mg q8hrs on 4/13.      Hypomagnesemia  -1.3 (4/11)-->1.6 (4/13) monitor  -4/16 Mg 1.5-replaced  -4/17 1.5-replaced  -4/18 1.6-replaced     Hypokalemia  -4/16 K-3.1-replaced  -4/17 3.0-replaced  -4/18 3.4-replaced     Atrial Fibrillation  -Rate controlled  -Cardiology consulted: repeat ECHO when deemed appropriate,   -OSH records show pre-existing LBBB, no further evaluation needed.  -4/16: run of afib with RVR overnight, controlled with lopressor and one dose digoxin.     CKD Stage II (mild)  S/P kidney transplant (Stable)  - One functioning transplanted kidney  - KTM consult  - Avoid nephrotoxic agents, including contrast  - will defer abdominal imaging decision for cancer to KTM/surg-onc team  - Cr. 0.9, lytes WNL  - Mag/Phos   - Will change prednisone 10 mg PO daily to soludmedrol 10 mg IV daily while unable to tolerate PO  - Wife able to give patient imuran 150 mg PO daily crushed up in pudding at home.  No IV available, per pharmacy. Continue PO Imuran as long as patient can tolerate.  - 4/11 - maumus: Stable functioning kidney Cr .7 BUN 14, continue PO Imuran as long as tolerated.   -4/17 Per nephrology: cylex ordered and pending, CMV ordered for leukopenia     History of DVT (deep vein thrombosis)  H/o DVT and PE  - Currently not on AC.  Was on warfarin for years, but switched to eliquis.  Eliquis recently stopped d/t inability to tolerate PO.  Will need to be restarted on eliquis after EGD/surgical intreventions.    Patient does not know dosing.   - IVC filter currently in place  - Currently in hypercoagulable state  - 4/11 - Maumus: Continue treatment with eliquis after intervention with swallowing difficulty.   - 4/18: started lovenox as no procedures planned this admission.      Dehydration  -2/2 esophageal obstruction  - IVF replacement  - 4/11  - Maumus: Continue IV hydration     Chronic Bilateral edema of lower extremity  - BNP 65 in setting of obesity  - will order 2D ECHO  - no erythema, swelling appears equal     Weakness  - PT/OT consult  - 4/11 - Maumus: PT/OT consult when ready from intervention of esophageal spasm and gastric cancer     Hypertension  - Stable  - Continue losartan 50 mg PO daily, cardizem 60 mg PO TID when able to tolerate PO    thrombocytopenia   143 - monitor     DVT PPx: lovenox     Discharge plan and follow up     Provider     I personally scribed for Levi Villasenor MD on 04/18/2018 at 2:15 PM. Electronically signed by konstantin Eden on 04/18/2018 at 2:15 PM.  The documentation recorded by the scribe accurately reflects service I personally performed and the decisions made by me.  Levi Villasenor MD  Attending Staff Physician  [facility]  pager- [phone]  [facility] - [phone]

## 2018-04-18 NOTE — PT/OT/SLP PROGRESS
Speech Language Pathology  Discharge Summary    Elbert Connelly  MRN: [id number]    Patient not seen today secondary to Other (Comment). SLP reviews Patient with nurse prior to session, nurse confirms Patient's family has elected for d/c to hospice and Patient on comfort measures.  No additional ST warranted at this time. Thank you.     MARK Aiken., [facility]-SLP  Speech-Language Pathology  Pager: [phone]  4/18/2018

## 2018-04-18 NOTE — PT/OT/SLP DISCHARGE
Occupational Therapy Discharge Summary    Elbert Connelly  MRN: [id number]   Principal Problem: Atrial fibrillation      Patient Discharged from acute Occupational Therapy on 4/18/2018.  Please refer to prior OT note dated 4/14/2018 for functional status.    Assessment:      Evaluation only completed with pt reporting poor prognosis     Objective:     GOALS:    Occupational Therapy Goals        Problem: Occupational Therapy Goal    Goal Priority Disciplines Outcome Interventions   Occupational Therapy Goal     OT, PT/OT Ongoing (interventions implemented as appropriate)                    Reasons for Discontinuation of Therapy Services  Patient declines to continue care.      Plan:     Patient Discharged to: Palliative Care/Hospice (expected to d/c 4/18 or 4/19)    Lou Stallworth, OT  4/18/2018

## 2018-04-18 NOTE — CONSULTS
[facility]  Hematology/Oncology  Consult Note    Patient Name: Elbert Connelly  MRN: [id number]  Admission Date: 4/10/2018  Hospital Length of Stay: 8 days  Code Status: Full Code   Attending Provider: Levi Villasenor MD  Consulting Provider: Alok Welsh MD  Primary Care Physician: Primary Doctor No  Principal Problem:Atrial fibrillation    Consults  Subjective:     HPI: Mr. 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 The patient states he has had trouble swallowing both liquids and solids since January of this year.  [facility] to [facility] es[facility].  Recently underwent an EGD on 4/6/18 which showed a dilated proximal esophagus with a distal ring/stricture, diffuse gastric edema/erythema with biopsies positive for gastric adenocarcinoma with infiltration of the lamina propria. CT with concerns for lymph node metastases along lesser curve and diffuse thickening of distal esophagus. He was seen by Surgery and recommended EUS and possible need for neoadjuvant therapy with prehab prior to surgical intervention. He was also seen by GI and plan was for and EGD/EUS for evaluation of his dysphagia and staging purpose. Unfortnately, patient became hemodynamically unstable while prepping for the procedure and procedure has been delayed now and likely will be done as an outpatient given the elective nature of the procedure. Plan is for medical and nutritional optimization and potential reattempt the EUS as an outpatient in 2 weeks. Medical Oncology consulted for management of Gastric Cancer.     Medications:  Continuous Infusions:   Amino acid 5% - dextrose 20% (CLINIMIX-E) solution with additives (1L provides 50 gm AA, 200 gm CHO (680 kcal/L dextrose), Na 35, K 30, Mg 5, Ca 4.5, Acetate 80, Cl  39, Phos 15) 20 mL/hr at 04/17/18 2156    dextrose 5% lactated ringers 50 mL/hr at 04/17/18 0847     Scheduled Meds:   azaTHIOprine  150 mg Oral Daily    clindamycin (CLEOCIN) IVPB  600 mg Intravenous Q8H    enoxaparin  1 mg/kg Subcutaneous Q12H    methylPREDNISolone sodium succinate  10 mg Intravenous Daily    miconazole   Topical (Top) BID    morphine  2 mg Intravenous Once    pantoprazole 40 mg in dextrose 5 % 100 mL infusion (ready to mix system)  40 mg Intravenous BID    sodium chloride 0.9%  10 mL Intravenous Q6H    thiamine (VITAMIN B1) IVPB  100 mg Intravenous Daily     PRN Meds:acetaminophen, albuterol-ipratropium 2.5mg-0.5mg/3mL, bisacodyl, dextrose 50%, dextrose 50%, glucagon (human recombinant), glucose, glucose, hydrALAZINE, metoprolol, morphine, omnipaque, ondansetron, promethazine (PHENERGAN) IVPB, senna-docusate 8.6-50 mg, Flushing PICC Protocol **AND** sodium chloride 0.9% **AND** sodium chloride 0.9%, sodium chloride 0.9%     Review of patient's allergies indicates:   Allergen Reactions    Allopurinol analogues         Past Medical History:   Diagnosis Date    Cancer     stomach    Hypertension     Living-donor kidney transplant (sister) 1982 4/11/2018    Renal disorder 1984    bilat kidney transplant      Past Surgical History:   Procedure Laterality Date    APPENDECTOMY      w cancer tumor removal    CHOLECYSTECTOMY      COLON SURGERY      removed during appendectomy to remove tumors    KIDNEY TRANSPLANT Bilateral 1984     Family History     None        Social History Main Topics    Smoking status: Never Smoker    Smokeless tobacco: Never Used    Alcohol use No    Drug use: No    Sexual activity: No       Review of Systems   Constitutional: Positive for fatigue and unexpected weight change. Negative for chills.   Respiratory: Negative for cough and shortness of breath.    Cardiovascular: Negative for chest pain and leg swelling.   Gastrointestinal: Positive for abdominal  pain, nausea and vomiting.   Genitourinary: Negative for decreased urine volume, difficulty urinating and dysuria.   Skin: Negative for rash.   Allergic/Immunologic: Positive for immunocompromised state.     Objective:     Vital Signs (Most Recent):  Temp: 96.8 °F (36 °C) (04/18/18 0459)  Pulse: 72 (04/18/18 0459)  Resp: 20 (04/18/18 0459)  BP: 136/81 (04/18/18 0459)  SpO2: 98 % (04/18/18 0459) Vital Signs (24h Range):  Temp:  [96.8 °F (36 °C)-98.3 °F (36.8 °C)] 96.8 °F (36 °C)  Pulse:  [61-97] 72  Resp:  [16-20] 20  SpO2:  [96 %-100 %] 98 %  BP: (127-167)/(73-82) 136/81     Weight: 88.8 kg (195 lb 12.3 oz)  Body mass index is 30.66 kg/m².  Body surface area is 2.05 meters squared.      Intake/Output Summary (Last 24 hours) at 04/18/18 0622  Last data filed at 04/18/18 0300   Gross per 24 hour   Intake              500 ml   Output             1875 ml   Net            -1375 ml       Physical Exam  Constitutional: He is oriented to person, place, and time. He is cooperative.   Eyes: Lids are normal.   Cardiovascular: Normal rate and regular rhythm.    No murmur heard.  Pulmonary/Chest: Effort normal and breath sounds normal. He has no rales.   Abdominal: Soft. He exhibits no distension. There is tenderness. There is no guarding.   Musculoskeletal: Normal range of motion. He exhibits no edema.   Neurological: He is alert and oriented to person, place, and time.     Significant Labs:   CBC:   Recent Labs  Lab 04/17/18  0608 04/18/18  0511   WBC 3.62* 3.63*   HGB 9.9* 9.3*   HCT 29.0* 27.8*    143*    and CMP:   Recent Labs  Lab 04/17/18  0608 04/18/18  0511    137   K 3.0* 3.8   CL 99 99   CO2 32* 29   * 451*   BUN 5* 5*   CREATININE 0.6 0.7   CALCIUM 8.0* 8.1*   ANIONGAP 7* 9   EGFRNONAA >60.0 >60.0       Diagnostic Results:  I have reviewed all pertinent imaging results/findings within the past 24 hours.    CT Chest/Abdomen/Pelvis:    Bilateral pleural effusions left slightly greater than right.   No significant mediastinal or hilar adenopathy.  Evaluation of the lungs demonstrate no convincing nodules.  Some atelectasis at the bases.  Small perifissural nodule on the right of doubtful significance.    The distal esophagus demonstrates diffuse esophageal wall thickening which extends into the stomach.  There is some mild diffuse thickening of the gastric wall.  There is soft tissue stranding about the distal esophagus likely corresponding to the known gastric mass.    There is a 1.3 cm nodule abutting the lesser curvature of stomach and left lobe of the liver.  Not certain the exact location though left lobe liver is preferred.  No additional hepatic masses.  Gallbladder is been removed.  Pancreas and spleen are unremarkable.  No adrenal masses.  Native kidneys are atrophic with punctate calcifications bilaterally.  IVC filter with some penetration of the struts.  Ectatic tortuous aorta.  Left common iliac artery stent.  3 cm left common iliac aneurysm without flow in the excluded component.    Assessment/Plan:     Active Diagnoses:    Diagnosis Date Noted POA    PRINCIPAL PROBLEM:  Atrial fibrillation [I48.91] 04/12/2018 Unknown    Protein-calorie malnutrition [E46] 04/16/2018 Yes    Hypophosphatemia [E83.39] 04/15/2018 Yes    Gastric carcinoma [C16.9] 04/11/2018 Unknown    Weakness [R53.1] 04/11/2018 Unknown    Esophageal spasm [K22.4] 04/11/2018 Unknown    History of DVT (deep vein thrombosis) [Z86.718] 04/11/2018 Not Applicable    Living-donor kidney transplant (sister) 1982 [Z94.0] 04/11/2018 Not Applicable    Hypertension [I10] 04/11/2018 Unknown    Chronic kidney disease (CKD), stage II (mild) [N18.2] 04/11/2018 Yes     Chronic    Prophylactic immunotherapy [Z29.8] 04/11/2018 Not Applicable    Hypokalemia [E87.6] 04/11/2018 Yes    Hypomagnesemia [E83.42] 04/11/2018 Yes    Nonrheumatic aortic valve disorder [I35.9]  Unknown    Esophageal obstruction [K22.2] 04/10/2018 Yes    Bilateral  edema of lower extremity [R60.0] 04/10/2018 Unknown    Dehydration [E86.0] 04/10/2018 Unknown      Problems Resolved During this Admission:    Diagnosis Date Noted Date Resolved POA       Gastric Cancer  Malnutrition  Physical Deconditioning   CKD s/p Renal Transplant    - discussed at length with patient and his family regarding his disease condition and management.   - according to staging scans appears to have diffuse distal esophageal wall thickening likely related to malignancy, 1.3 cm nodule abutting the lesser curvature of the stomach and left lobe of liver. Not certain the exact location though left lobe liver is preferred per radiology.   - seen by surgical oncology and feel that he would benefit from neoadjuvant chemotherapy permitting improvement with pre-rehab and nutrition.   - Unfortunately, given his comorbidites, malnutrition, and poor performance status, he is unable to undergo potential diagnostic/staging workup at the time. Only means of nutrition currently is with TPN, oral intake limited due to esophageal spasm ?pseudoachalasia/malignancy.  - again needs improvement in nutritional status and performance status in order to be considered for any chemotherapy from medical oncology standpoint. He is not a candidate for immunotherapy given his renal transplant. Prognosis remains poor.   - unlikely he would be an optimal candidate for any surgical procedure at this time given his hemodynamic unstability, but can discuss with Surgical Oncology to see if patient would benefit from a J-Tube in the future while temporizating him with TPN in the interim.  - we are willing to see him in the clinic while he is being medically and physically being optimized in few weeks and re-evaluate his case should he wish to go home on TPN and Rehab.  - family and patient are open to discussion regarding supportive/palliative care as well and I have provided some information regarding this matter and will reach out to  them later today to see what decision they have made.        Will discuss case with Dr. Mayank Beatty        Thank you for your consult. I will follow-up with patient. Please contact us if you have any additional questions.    Alok Welsh MD  Hematology/Oncology  [facility]    Attending Addendum:  The patient was seen, examined, and discussed on rounds with the team.  I agree with the assessment and plan as outlined for Elbert Connelly.  Difficult case with patient with multiple comorbid conditions unable to undergo complete staging.  Poor performance status, ECOG PS 4.  If we can improve this he may be a candidate for therapy, however, would consider surgery oncology evaluation for Jtube to maximize nutrition.  If not may consider going home with TPN and home ilya. Palliative care with hospice would also be appropriate if willing.  Will follow up.      Mayank Beatty DO, FACP  Hematology & Oncology  [address] [place] [address]  ph. [phone]  Fax. [phone]

## 2018-04-18 NOTE — PLAN OF CARE
Problem: Patient Care Overview  Goal: Plan of Care Review    Recommendations     Recommendation/Intervention:   1. Recommend advancing TPN to Clinimix 5/20 at a goal rate of 40 ml/hr.   -If labs stable over next 24-48 hours, advance to Clinimix 5/20 at 60 ml/hr with IV lipids.   -If labs stable over next 24 hours, advance to a goal rate of Clinimix 5/20 to final goal rate of 90 ml/hr with IV lipids.   -Additional fluid needs per MD.      2. If able to place J-tube or other means for TF, recommend Isosource at a goal rate of 65 ml/hr.      3. RD following.     Goals: Provision of nutrition with K, Phos, Mg, WNL  Nutrition Goal Status: goal met

## 2018-04-18 NOTE — PLAN OF CARE
Pt labs reviewed; renal function remains stable    Cont current IS regimen with prednisone 5mg and azathioprine 150mg daily

## 2018-04-18 NOTE — ASSESSMENT & PLAN NOTE
Malnutrition in the context of Chronic Illness/Injury    Related to (etiology):  Altered GI function    Signs and Symptoms (as evidenced by):  Energy Intake: <50% of estimated energy requirement for 4 months  Weight Loss: 33% x 4 months     Interventions/Recommendations (treatment strategy):  See recs.    Nutrition Diagnosis Status:  Continues

## 2018-04-18 NOTE — PT/OT/SLP PROGRESS
Physical Therapy  Pt Not Seen    Patient Name:  Elbert Connelly   MRN:  [id number]    Patient not seen today secondary  Other (Comment) (OT (Lou) reports pt and family opting for hospice at home and are declining terapy services. PT to discontinue orders and write d/c summary).     Beverley Fields, PTA  4/18/2018

## 2018-04-18 NOTE — PLAN OF CARE
Discharge planning: Met with patient, spouse,son and daughter in law to discuss d/c plans. Spouse states that they have been informed that prognosis is poor and would like to focus on comfort. Choice is home hospice with [facility] and they provided CM with contact information [phone] and fax [phone]. Plan for discharge tomorrow.    11:55 Received call from Yue with [facility] and she has spoken to patient's son and plans for equipment delivery in am.     1:00pm Faxed FS, H&P to [facility].

## 2018-04-18 NOTE — PROGRESS NOTES
" [facility]  Adult Nutrition  Progress Note    SUMMARY       Recommendations    Recommendation/Intervention:   1. Recommend advancing TPN to Clinimix 5/20 at a goal rate of 40 ml/hr.   -If labs stable over next 24-48 hours, advance to Clinimix 5/20 at 60 ml/hr with IV lipids.   -If labs stable over next 24 hours, advance to a goal rate of Clinimix 5/20 to final goal rate of 90 ml/hr with IV lipids.   -Additional fluid needs per MD.     2. If able to place J-tube or other means for TF, recommend Isosource at a goal rate of 65 ml/hr.     3. RD following.    Goals: Provision of nutrition with K, Phos, Mg, WNL  Nutrition Goal Status: goal met  Communication of RD Recs:  (POC)    Reason for Assessment    Reason for Assessment: NPO/clear liquids x 5 days  Diagnosis: gastrointestinal disease (esophageal obstruction)  Relevant Medical History: Gastric ca, HTN, s/p bilateral OKTx ()    General Information Comments: Refeeding labs reviewed, no evidence of refeeding at this time. Phos has been WNL since beginning TPN with replacement of K, Mg as needed.    Nutrition Discharge Planning: Unable to determine at this time    Nutrition Risk Screen    Nutrition Risk Screen: dysphagia or difficulty swallowing    Nutrition/Diet History    Typical Food/Fluid Intake: Emesis with PO intake x 4 months  Do you have any cultural, spiritual, Orthodoxy conflicts, given your current situation?: none reported  Factors Affecting Nutritional Intake: altered gastrointestinal function, difficulty/impaired swallowing, NPO    Anthropometrics    Temp: 96.8 °F (36 °C)  Height Method: Stated  Height: 5' 7" (170.2 cm)  Height (inches): 67 in  Weight Method: Bed Scale  Weight: 88.8 kg (195 lb 12.3 oz)  Weight (lb): 195.77 lb  Ideal Body Weight (IBW), Male: 148 lb  % Ideal Body Weight, Male (lb): 132.28 lb  BMI (Calculated): 30.7  BMI Grade: 30 - 34.9- obesity - grade I  Usual Body Weight (UBW), k.6 kg  % Usual Body Weight: " 66.61  % Weight Change From Usual Weight: -33.53 %       Lab/Procedures/Meds    Pertinent Labs Reviewed: reviewed  Pertinent Labs Comments: K 3.4, Glu 131  Pertinent Medications Reviewed: reviewed  Pertinent Medications Comments: MG, methylprednisolone, pantoprazole, KCl, thiamine    Physical Findings/Assessment    Overall Physical Appearance: overweight  Tubes:  (PICC)  Oral/Mouth Cavity: WDL  Skin: intact    Estimated/Assessed Needs    Weight Used For Calorie Calculations: 88.8 kg (195 lb 12.3 oz)  Energy Calorie Requirements (kcal): 2578-2551  Energy Need Method: Kcal/kg (25-30)  Protein Requirements:  gm (1.0-1.2 gm/kg)  Weight Used For Protein Calculations: 88.8 kg (195 lb 12.3 oz)     Fluid Need Method: RDA Method (1 ml/kcal or per MD)  RDA Method (mL): 2220       Nutrition Prescription Ordered    Current Diet Order: NPO  Current Nutrition Support Formula Ordered: Clinimix E 5/20  Current Nutrition Support Rate Ordered: 20 (ml)  Current Nutrition Support Frequency Ordered: ml/hr    Evaluation of Received Nutrient/Fluid Intake    Parenteral Calories (kcal): 422  Parenteral Protein (gm): 24  Parenteral Fluid (mL): 480  Total Calories (kcal/kg): 4.8  % Kcal Needs: 19  % Protein Needs: 27  IV Fluid (mL):  (D5W in LR @ 50 ml/hr)  Total Fluid Intake (mL): 1680  I/O: -1.6L  Comments: LBM 4/5  % Intake of Estimated Energy Needs: 0 - 25 %  % Meal Intake: NPO    Nutrition Risk    Level of Risk/Frequency of Follow-up:  (F/u 2x weekly)     Assessment and Plan    Protein-calorie malnutrition    Malnutrition in the context of Chronic Illness/Injury    Related to (etiology):  Altered GI function    Signs and Symptoms (as evidenced by):  Energy Intake: <50% of estimated energy requirement for 4 months  Weight Loss: 33% x 4 months     Interventions/Recommendations (treatment strategy):  See recs.    Nutrition Diagnosis Status:  Continues           Monitor and Evaluation    Food and Nutrient Intake: energy intake,  parenteral nutrition intake, enteral nutrition intake  Food and Nutrient Adminstration: diet order, enteral and parenteral nutrition administration  Anthropometric Measurements: weight, weight change, body mass index  Biochemical Data, Medical Tests and Procedures: electrolyte and renal panel, gastrointestinal profile, glucose/endocrine profile, inflammatory profile  Nutrition-Focused Physical Findings: overall appearance     Nutrition Follow-Up    RD Follow-up?: Yes

## 2018-04-18 NOTE — PLAN OF CARE
Problem: Patient Care Overview  Goal: Plan of Care Review  Plan of care discussed with patient.  Patient ambulating independently, fall precautions in place. Patient has no complaints of pain. Clinimex and LR gtt continued. Discussed medications and care. Patient has no questions at this time.White board updated. Bed locked in lowest position. Side rails up x2. Will continue to monitor.

## 2018-04-18 NOTE — MEDICAL/APP STUDENT
Hospital Medicine  Progress note    Team: [facility] HOSP MED B Dr. Villasenor  Admit Date: 4/10/2018  LIO 4/24/2018  Code status: Full Code    Principal Problem:  Atrial fibrillation    Interval hx:    4/11: H&P note  4/12 - Chaz: U/S endoscopic upper cancelled for cardiovascular complication, SR converted to ST after intubation with hypotension. Given phenylephrine 100mcg.   4/13: Talk to GI about EGD. General surgery states most likely will require neoadjuvant therapy prior to surgical intervention.    4/14: LLE erythema and warmth medially. Started on clindamycin 600mg q8hrs. Scheduled for EGD Monday. Consider feeding tube early next week, possibly starting TPN today.   4/15: EGD Monday. Placed PICC line order overnight. Possibly start TPN after EGD, consider feeding tube next week.   4/16: Had a run of Afib with RVR last night. Controlled with lopressor and one dose digoxin. EGD held until patient more hemodynamically stable. PICC line placement today to start TPN. Replaced K and Mg.  4/17: Start TPN today 20ml/hr. Started morphine IV PRN for pain relief. Heme/Onc consult. AES rec: EGD outpatient in 2 weeks once medically optimized. Replaced K and Mg. Heme/Onc consult. Nephrology rec: CMV testing ordered for leukopenia, cylex ordered and pending.   4/18: Heme/Onc recs: not a candidate for chemotherapy due to nutritional status and not a candidate for immunotherapy due to hx of renal transplant. May possibly benefit from J-tube, recommend consulting Surg Onc. K and Mg replaced. TPN to increase to 40ml/hr. Started lovenox for hx DVT.      ROS     Constitutional: Increased Fatigue  Pain Scale: 4 /10 intermittent abdominal pain.  Respiratory: no cough or shortness of breath  Cardiovascular: no chest pain or palpitations  Gastrointestinal: Nausea. No vomiting, change in bowel habits. Intermittent abdominal pain.   Behavioral/Psych: no depression or anxiety      PEx  Temp:  [96.8 °F (36 °C)-98.3 °F (36.8 °C)]   Pulse:   [58-97]   Resp:  [18-20]   BP: (127-167)/(74-82)   SpO2:  [94 %-100 %]     Intake/Output Summary (Last 24 hours) at 04/18/18 0932  Last data filed at 04/18/18 0638   Gross per 24 hour   Intake              500 ml   Output             1875 ml   Net            -1375 ml       General Appearance: no acute distress   Heart: regular rate and rhythm  Respiratory: Normal respiratory effort, no crackles   Abdomen: Soft, non-tender; bowel sounds active  Skin: intact. IV sites ok.  LLE erythema and warmth medially  Neurologic:  No focal numbness or weakness  Mental status: Alert, oriented x 4, affect appropriate       Recent Labs  Lab 04/16/18  0552 04/17/18  0608 04/18/18  0511   WBC 3.48* 3.62* 3.63*   HGB 10.1* 9.9* 9.3*   HCT 29.8* 29.0* 27.8*    151 143*       Recent Labs  Lab 04/15/18  2042 04/16/18  0552 04/17/18  0608 04/18/18  0511 04/18/18  0620    139 138 137 137   K 3.8 3.1* 3.0* 3.8 3.4*    101 99 99 100   CO2 29 25 32* 29 30*   BUN 7* 5* 5* 5* 5*   CREATININE 0.7 0.6 0.6 0.7 0.7   * 112* 139* 451* 131*   CALCIUM 8.4* 7.9* 8.0* 8.1* 8.5*   MG 1.6 1.5* 1.5* 1.6  --    PHOS 3.3 3.0  --  3.5  --      No results for input(s): ALKPHOS, ALT, AST, ALBUMIN, PROT, BILITOT, INR in the last 168 hours.     Recent Labs  Lab 04/12/18  2154 04/13/18  0741 04/15/18  1900 04/18/18  0608   POCTGLUCOSE 113* 112* 118* 136*     No results for input(s): CPK, CPKMB, MB, TROPONINI in the last 72 hours.    Scheduled Meds:   azaTHIOprine  150 mg Oral Daily    clindamycin (CLEOCIN) IVPB  600 mg Intravenous Q8H    enoxaparin  1 mg/kg Subcutaneous Q12H    magnesium sulfate IVPB  2 g Intravenous Once    methylPREDNISolone sodium succinate  10 mg Intravenous Daily    miconazole   Topical (Top) BID    morphine  2 mg Intravenous Once    pantoprazole 40 mg in dextrose 5 % 100 mL infusion (ready to mix system)  40 mg Intravenous BID    potassium chloride  10 mEq Intravenous Q1H    sodium chloride 0.9%  10 mL  Intravenous Q6H    thiamine (VITAMIN B1) IVPB  100 mg Intravenous Daily     Continuous Infusions:   Amino acid 5% - dextrose 20% (CLINIMIX-E) solution with additives (1L provides 50 gm AA, 200 gm CHO (680 kcal/L dextrose), Na 35, K 30, Mg 5, Ca 4.5, Acetate 80, Cl 39, Phos 15) 20 mL/hr at 04/17/18 2156    dextrose 5% lactated ringers 50 mL/hr at 04/17/18 0847     As Needed:  acetaminophen, albuterol-ipratropium 2.5mg-0.5mg/3mL, bisacodyl, dextrose 50%, dextrose 50%, glucagon (human recombinant), glucose, glucose, hydrALAZINE, metoprolol, morphine, omnipaque, ondansetron, promethazine (PHENERGAN) IVPB, senna-docusate 8.6-50 mg, Flushing PICC Protocol **AND** sodium chloride 0.9% **AND** sodium chloride 0.9%, sodium chloride 0.9%    Active Hospital Problems    Diagnosis  POA    *Atrial fibrillation [I48.91]  Unknown    Protein-calorie malnutrition [E46]  Yes    Hypophosphatemia [E83.39]  Yes    Gastric carcinoma [C16.9]  Unknown    Weakness [R53.1]  Unknown    Esophageal spasm [K22.4]  Unknown    History of DVT (deep vein thrombosis) [Z86.718]  Not Applicable    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 May change pred to SM and hold  Aza for short period if unable to swallow.  -Follow with strict I/Os, daily weights, BP (goal <140/90), daily labs.    -Check immuknow cylex to better assess net IS.      Hypokalemia [E87.6]  Yes    Hypomagnesemia [E83.42]  Yes    Nonrheumatic aortic valve disorder [I35.9]  Unknown    Esophageal obstruction [K22.2]  Yes    Bilateral edema of lower extremity [R60.0]  Unknown    Dehydration [E86.0]  Unknown      Resolved Hospital Problems    Diagnosis Date Resolved POA   No resolved problems to display.       Overview:   Elbert Connelly is a 67 y.o. male with medical problems including HTN, DVT/PE (IVC filter), renal disorder, and history of kidney transplant who presents with complaint of abdominal pain, fatigue, weakness, dizziness and esophageal obstruction.        Assessment and Plan for Problems addressed today:    Esophageal obstruction  Esophageal spasm  Gastric carcnoma  - AES consult for EGD/EUS for gastric cancer  - Surgical oncology consult and appreciate recs  - Will need documentation from OSH  - Unable to tolerate saliva but protecting airway.  - Strict NPO, aspiration precautions  - Not currently on anticoagulation- will need to be restarted on this admission  - LR mIVF x12 hours  - 4/11 - Maumus: NPO for procedure/surgery and aspiration concern, IV hydration and trend labs for electrolytes . CT thorax/abdomen/pelvis w/IV contrast for staging  - EUS tomorrow pending CT results  - SLP consult for recs re: meds/diet   - Surg Onc consultation - paged 12 noon  - 4/12: U/S endoscopy upper cancelled for cardiovascular reasons  - 4/13: follow up with GI about EGD  - 4/14: Scheduled for EGD Monday. Consider feeding tube early next week, possibly starting TPN today.   - 4/15: EGD Monday. PICC line order placed overnight. Consider starting TPN after EGD, and consider feeding tube early next week.   -4/16: EGD held due to afib with RVR overnight. PICC line placement today to start TPN.  dynamically stable. PICC line placement today to start TPN. Replaced K and Mg.  -4/17: Started TPN today 20ml/hr. Started morphine IV PRN for pain relief. AES rec: EGD outpatient in 2 weeks once medically optimized. Heme/Onc Consult.  -4/18: Heme/Onc recs: not a candidate for chemotherapy due to nutritional status and not a candidate for immunotherapy due to hx of renal transplant. May possibly benefit from J-tube, recommend consulting Surg Onc. TPN to increase to 40ml/hr.      Cellulitis LLE  -LLE erythema and warmth medially. Started on  clindamycin 600mg q8hrs on 4/13.     Hypomagnesemia  -1.3 (4/11)-->1.6 (4/13) monitor  -4/16 Mg 1.5-replaced  -4/17 1.5-replaced  -4/18 1.6-replaced    Hypokalemia  -4/16 K-3.1-replaced  -4/17 3.0-replaced  -4/18 3.4-replaced    Atrial Fibrillation  -Rate controlled  -Cardiology consulted: repeat ECHO when deemed appropriate,   -OSH records show pre-existing LBBB, no further evaluation needed.  -4/16: run of afib with RVR overnight, controlled with lopressor and one dose digoxin.    CKD Stage II (mild)  S/P kidney transplant (Stable)  - One functioning transplanted kidney  - KTM consult  - Avoid nephrotoxic agents, including contrast  - will defer abdominal imaging decision for cancer to KTM/surg-onc team  - Cr. 0.9, lytes WNL  - Mag/Phos   - Will change prednisone 10 mg PO daily to soludmedrol 10 mg IV daily while unable to tolerate PO  - Wife able to give patient imuran 150 mg PO daily crushed up in pudding at home.  No IV available, per pharmacy. Continue PO Imuran as long as patient can tolerate.  - 4/11 - maumus: Stable functioning kidney Cr .7 BUN 14, continue PO Imuran as long as tolerated.   -4/17 Per nephrology: cylex ordered and pending, CMV ordered for leukopenia     History of DVT (deep vein thrombosis)  H/o DVT and PE  - Currently not on AC.  Was on warfarin for years, but switched to eliquis.  Eliquis recently stopped d/t inability to tolerate PO.  Will need to be restarted on eliquis after EGD/surgical intreventions.    Patient does not know dosing.   - IVC filter currently in place  - Currently in hypercoagulable state  - 4/11 - Maumus: Continue treatment with eliquis after intervention with swallowing difficulty.   - 4/18: started lovenox as no procedures planned this admission.     Dehydration  -2/2 esophageal obstruction  - IVF replacement  - 4/11 - Maumus: Continue IV hydration     Chronic Bilateral edema of lower extremity  - BNP 65 in setting of obesity  - will order 2D ECHO  - no erythema,  swelling appears equal     Weakness  - PT/OT consult  - 4/11 - Nirmal: PT/OT consult when ready from intervention of esophageal spasm and gastric cancer     Hypertension  - Stable  - Continue losartan 50 mg PO daily, cardizem 60 mg PO TID when able to tolerate PO    DVT PPx: lovenox    Discharge plan and follow up    Provider    I personally scribed for Levi Villasenor MD on 04/18/2018 at 2:15 PM. Electronically signed by konstantin Eden on 04/18/2018 at 2:15 PM.

## 2018-04-18 NOTE — PLAN OF CARE
Problem: Patient Care Overview  Goal: Plan of Care Review  Outcome: Ongoing (interventions implemented as appropriate)  Pt remains free from falls and injury. Plan of care reviewed with pt. Pt understands plan. Pt c/o abd pain, VSS. Plans for hospice care was discussed. Thiamine. K and MG IV given. Pt remains on Protonix IV for stomach. Will continue to monitor.

## 2018-04-18 NOTE — PROGRESS NOTES
04/18/18 0604   Critical Value Communication   Notified Physician/Designee Way,NP   Date Result Received 04/18/18   Time Result Received 0604   Resulting Department of Critical Value Lab   Who communicated critical value from resulting department? Yudykaterin Arrington   Critical Test #1 Glucose    Critical Test #1 Result 451   Date Notified 04/18/18   Time Notified 0612   Read Back Verification Yes   Physician Directive Redraw as lab collect     CBG check 136. No further orders were given. Will continue to monitor.

## 2018-04-19 VITALS
HEART RATE: 87 BPM | HEIGHT: 67 IN | SYSTOLIC BLOOD PRESSURE: 153 MMHG | OXYGEN SATURATION: 95 % | BODY MASS INDEX: 30.72 KG/M2 | RESPIRATION RATE: 18 BRPM | DIASTOLIC BLOOD PRESSURE: 89 MMHG | TEMPERATURE: 98 F | WEIGHT: 195.75 LBS

## 2018-04-19 LAB
ALBUMIN SERPL BCP-MCNC: 2.3 G/DL
ALP SERPL-CCNC: 41 U/L
ALT SERPL W/O P-5'-P-CCNC: 7 U/L
ANION GAP SERPL CALC-SCNC: 7 MMOL/L
ANION GAP SERPL CALC-SCNC: 7 MMOL/L
AST SERPL-CCNC: 10 U/L
BASOPHILS # BLD AUTO: 0.01 K/UL
BASOPHILS NFR BLD: 0.3 %
BILIRUB SERPL-MCNC: 1.3 MG/DL
BUN SERPL-MCNC: 7 MG/DL
BUN SERPL-MCNC: 7 MG/DL
CALCIUM SERPL-MCNC: 8.1 MG/DL
CALCIUM SERPL-MCNC: 8.1 MG/DL
CHLORIDE SERPL-SCNC: 102 MMOL/L
CHLORIDE SERPL-SCNC: 102 MMOL/L
CMV DNA SERPL NAA+PROBE-ACNC: NORMAL IU/ML
CO2 SERPL-SCNC: 29 MMOL/L
CO2 SERPL-SCNC: 29 MMOL/L
CREAT SERPL-MCNC: 0.6 MG/DL
CREAT SERPL-MCNC: 0.6 MG/DL
DIFFERENTIAL METHOD: ABNORMAL
EOSINOPHIL # BLD AUTO: 0.1 K/UL
EOSINOPHIL NFR BLD: 2.6 %
ERYTHROCYTE [DISTWIDTH] IN BLOOD BY AUTOMATED COUNT: 18.1 %
EST. GFR  (AFRICAN AMERICAN): >60 ML/MIN/1.73 M^2
EST. GFR  (AFRICAN AMERICAN): >60 ML/MIN/1.73 M^2
EST. GFR  (NON AFRICAN AMERICAN): >60 ML/MIN/1.73 M^2
EST. GFR  (NON AFRICAN AMERICAN): >60 ML/MIN/1.73 M^2
GLUCOSE SERPL-MCNC: 122 MG/DL
GLUCOSE SERPL-MCNC: 122 MG/DL
HCT VFR BLD AUTO: 27.5 %
HGB BLD-MCNC: 9.2 G/DL
IMM GRANULOCYTES # BLD AUTO: 0.03 K/UL
IMM GRANULOCYTES NFR BLD AUTO: 0.8 %
LYMPHOCYTES # BLD AUTO: 1 K/UL
LYMPHOCYTES NFR BLD: 26.9 %
MAGNESIUM SERPL-MCNC: 1.7 MG/DL
MCH RBC QN AUTO: 31.9 PG
MCHC RBC AUTO-ENTMCNC: 33.5 G/DL
MCV RBC AUTO: 96 FL
MONOCYTES # BLD AUTO: 0.1 K/UL
MONOCYTES NFR BLD: 2.3 %
NEUTROPHILS # BLD AUTO: 2.6 K/UL
NEUTROPHILS NFR BLD: 67.1 %
NRBC BLD-RTO: 0 /100 WBC
PHOSPHATE SERPL-MCNC: 2.6 MG/DL
PLATELET # BLD AUTO: 152 K/UL
PMV BLD AUTO: 9.9 FL
POTASSIUM SERPL-SCNC: 3.6 MMOL/L
POTASSIUM SERPL-SCNC: 3.6 MMOL/L
PROT SERPL-MCNC: 5.1 G/DL
RBC # BLD AUTO: 2.88 M/UL
SODIUM SERPL-SCNC: 138 MMOL/L
SODIUM SERPL-SCNC: 138 MMOL/L
WBC # BLD AUTO: 3.87 K/UL

## 2018-04-19 PROCEDURE — 25000003 PHARM REV CODE 250: Performed by: PHYSICIAN ASSISTANT

## 2018-04-19 PROCEDURE — 85025 COMPLETE CBC W/AUTO DIFF WBC: CPT

## 2018-04-19 PROCEDURE — 63600175 PHARM REV CODE 636 W HCPCS: Performed by: PHYSICIAN ASSISTANT

## 2018-04-19 PROCEDURE — 25000003 PHARM REV CODE 250: Performed by: INTERNAL MEDICINE

## 2018-04-19 PROCEDURE — A4216 STERILE WATER/SALINE, 10 ML: HCPCS | Performed by: INTERNAL MEDICINE

## 2018-04-19 PROCEDURE — 84100 ASSAY OF PHOSPHORUS: CPT

## 2018-04-19 PROCEDURE — 25000003 PHARM REV CODE 250: Performed by: HOSPITALIST

## 2018-04-19 PROCEDURE — 63600175 PHARM REV CODE 636 W HCPCS: Performed by: HOSPITALIST

## 2018-04-19 PROCEDURE — 83735 ASSAY OF MAGNESIUM: CPT

## 2018-04-19 PROCEDURE — 99239 HOSP IP/OBS DSCHRG MGMT >30: CPT | Mod: ,,, | Performed by: HOSPITALIST

## 2018-04-19 PROCEDURE — C9113 INJ PANTOPRAZOLE SODIUM, VIA: HCPCS | Performed by: PHYSICIAN ASSISTANT

## 2018-04-19 PROCEDURE — S0077 INJECTION, CLINDAMYCIN PHOSP: HCPCS | Performed by: PHYSICIAN ASSISTANT

## 2018-04-19 PROCEDURE — 80053 COMPREHEN METABOLIC PANEL: CPT

## 2018-04-19 RX ORDER — CLINDAMYCIN HYDROCHLORIDE 150 MG/1
450 CAPSULE ORAL EVERY 6 HOURS
Qty: 36 CAPSULE | Refills: 0 | Status: SHIPPED | OUTPATIENT
Start: 2018-04-19 | End: 2018-04-22

## 2018-04-19 RX ORDER — CLINDAMYCIN HYDROCHLORIDE 150 MG/1
450 CAPSULE ORAL EVERY 6 HOURS
Qty: 36 CAPSULE | Refills: 0 | Status: SHIPPED | OUTPATIENT
Start: 2018-04-19 | End: 2018-04-19

## 2018-04-19 RX ADMIN — MICONAZOLE NITRATE: 20 CREAM TOPICAL at 09:04

## 2018-04-19 RX ADMIN — ENOXAPARIN SODIUM 90 MG: 100 INJECTION SUBCUTANEOUS at 09:04

## 2018-04-19 RX ADMIN — DEXTROSE 40 MG: 50 INJECTION, SOLUTION INTRAVENOUS at 09:04

## 2018-04-19 RX ADMIN — AZATHIOPRINE 150 MG: 50 TABLET ORAL at 09:04

## 2018-04-19 RX ADMIN — Medication 10 ML: at 06:04

## 2018-04-19 RX ADMIN — THIAMINE HYDROCHLORIDE 100 MG: 100 INJECTION, SOLUTION INTRAMUSCULAR; INTRAVENOUS at 09:04

## 2018-04-19 RX ADMIN — CLINDAMYCIN IN 5 PERCENT DEXTROSE 600 MG: 12 INJECTION, SOLUTION INTRAVENOUS at 06:04

## 2018-04-19 RX ADMIN — CLINDAMYCIN IN 5 PERCENT DEXTROSE 600 MG: 12 INJECTION, SOLUTION INTRAVENOUS at 12:04

## 2018-04-19 RX ADMIN — DEXTROSE MONOHYDRATE 15 MMOL: 5 INJECTION, SOLUTION INTRAVENOUS at 12:04

## 2018-04-19 RX ADMIN — METHYLPREDNISOLONE SODIUM SUCCINATE 10 MG: 40 INJECTION, POWDER, FOR SOLUTION INTRAMUSCULAR; INTRAVENOUS at 09:04

## 2018-04-19 RX ADMIN — Medication 10 ML: at 12:04

## 2018-04-19 NOTE — NURSING
Order for TPN . Pt currently on TPN at 40 ml/hr. Pt is NPO. Dr. Cespedes notified and MD stated he will reorder TPN. Will continue to monitor.

## 2018-04-19 NOTE — DISCHARGE SUMMARY
Discharge Summary  Hospital Medicine     Attending Provider on Discharge: Dr. Villasenor  [facility] Medicine Team: [facility]OSP MED B  Date of Admission:  4/10/2018     Date of Discharge:  4/19/2018  Code status: DNR (Do Not Resuscitate)            Active Hospital Problems     Diagnosis   POA    *Atrial fibrillation [I48.91]   Unknown    Thrombocytopenia, unspecified [D69.6]   Yes    Protein-calorie malnutrition [E46]   Yes    Hypophosphatemia [E83.39]   Yes    Gastric carcinoma [C16.9]   Unknown    Weakness [R53.1]   Unknown    Esophageal spasm [K22.4]   Unknown    History of DVT (deep vein thrombosis) [Z86.718]   Not Applicable    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 May change pred to SM and hold  Aza for short period if unable to swallow.  -Follow with strict I/Os, daily weights, BP (goal <140/90), daily labs.    -Check immuknow cylex to better assess net IS.       Hypokalemia [E87.6]   Yes    Hypomagnesemia [E83.42]   Yes    Nonrheumatic aortic valve disorder [I35.9]   Unknown    Esophageal obstruction [K22.2]   Yes    Bilateral edema of lower extremity [R60.0]   Unknown    Dehydration [E86.0]   Unknown       Resolved Hospital Problems     Diagnosis Date Resolved POA   No resolved problems to display.         HPI  Mr. Elbert Connelly is a 67 y.o. male with medical problems including HTN, DVT/PE (IVC filter), renal disorder, and history of kidney transplant who presents with complaint of abdominal pain, fatigue, weakness, dizziness and esophageal obstruction.      Hospital Course     Esophageal obstruction  Esophageal spasm  Gastric carcnoma  - AES consult for EGD/EUS for gastric cancer  - Surgical oncology consult and appreciate recs  - Will need  documentation from OSH  - Unable to tolerate saliva but protecting airway.  - Strict NPO, aspiration precautions  - Not currently on anticoagulation- will need to be restarted on this admission  - LR mIVF x12 hours  - 4/11 - Maumus: NPO for procedure/surgery and aspiration concern, IV hydration and trend labs for electrolytes . CT thorax/abdomen/pelvis w/IV contrast for staging  - EUS tomorrow pending CT results  - SLP consult for recs re: meds/diet   - 4/12: U/S endoscopy upper cancelled for cardiovascular reasons  - 4/14: Scheduled for EGD Monday. Consider feeding tube early next week, possibly starting TPN today.   -4/16: EGD held due to afib with RVR overnight. PICC line placement today to start TPN.  dynamically stable. Replaced K and Mg.  -4/17: Started TPN today 20ml/hr. Started morphine IV PRN for pain relief. AES rec: EGD outpatient in 2 weeks once medically optimized. Heme/Onc Consult.  -4/18: Heme/Onc recs: not a candidate for chemotherapy due to nutritional status and not a candidate for immunotherapy due to hx of renal transplant. May possibly benefit from J-tube, recommend consulting Surg Onc. TPN to increase to 40ml/hr.    -4/19: Patient and family decided on Home Hospice. Discontinued TPN and made DNR.      Cellulitis LLE  -LLE erythema and warmth medially- improved. Started on clindamycin 600mg q8hrs on 4/13. to continue 3 days of oral clindamycin     Hypomagnesemia  -1.3 (4/11)-->1.6 (4/13) monitor  -4/16 Mg 1.5-replaced  -4/17 1.5-replaced  -4/18 1.6-replaced     Hypokalemia  -4/16 K-3.1-replaced  -4/17 3.0-replaced  -4/18 3.4-replaced     Atrial Fibrillation  -Rate controlled  -Cardiology consulted: repeat ECHO when deemed appropriate,   -OSH records show pre-existing LBBB, no further evaluation needed.  -4/16: run of afib with RVR overnight, controlled with lopressor and one dose digoxin.     CKD Stage II (mild)  S/P kidney transplant (Stable)  - One functioning transplanted kidney  - KTM  consult  - Avoid nephrotoxic agents, including contrast  - will defer abdominal imaging decision for cancer to KTM/surg-onc team  - Cr. 0.9, lytes WNL  - Mag/Phos   - Will change prednisone 10 mg PO daily to soludmedrol 10 mg IV daily while unable to tolerate PO  - Wife able to give patient imuran 150 mg PO daily crushed up in pudding at home.  No IV available, per pharmacy. Continue PO Imuran as long as patient can tolerate.  - 4/11 - malaika: Stable functioning kidney Cr .7 BUN 14, continue PO Imuran as long as tolerated.   -4/17 Per nephrology: Cylex ordered and pending, CMV ordered for leukopenia     History of DVT (deep vein thrombosis)  H/o DVT and PE  - Currently not on AC.  Was on warfarin for years, but switched to eliquis.  Eliquis recently stopped d/t inability to tolerate PO.  Will need to be restarted on eliquis after EGD/surgical intreventions.    Patient does not know dosing.   - IVC filter currently in place  - Currently in hypercoagulable state  - 4/18: started lovenox as no procedures planned this admission.      Dehydration  -2/2 esophageal obstruction  - IVF replacement     Chronic Bilateral edema of lower extremity  - BNP 65 in setting of obesity  - will order 2D ECHO: EF 60-65%. Normal systolic and diastolic function.   - no erythema, swelling appears equal     Weakness  - PT/OT consult     Hypertension  - Stable  - Continue losartan 50 mg PO daily, cardizem 60 mg PO TID when able to tolerate PO     Recent Labs  Lab 04/17/18  0608 04/18/18  0511 04/19/18  0430   WBC 3.62* 3.63* 3.87*   HGB 9.9* 9.3* 9.2*   HCT 29.0* 27.8* 27.5*    143* 152         Recent Labs  Lab 04/16/18  0552 04/17/18  0608 04/18/18  0511 04/18/18  0620 04/19/18  0430    138 137 137 138  138   K 3.1* 3.0* 3.8 3.4* 3.6  3.6    99 99 100 102  102   CO2 25 32* 29 30* 29  29   BUN 5* 5* 5* 5* 7*  7*   CREATININE 0.6 0.6 0.7 0.7 0.6  0.6   * 139* 451* 131* 122*  122*   CALCIUM 7.9* 8.0* 8.1* 8.5*  8.1*  8.1*   MG 1.5* 1.5* 1.6  --  1.7   PHOS 3.0  --  3.5  --  2.6*         Recent Labs  Lab 04/19/18  0430   ALKPHOS 41*   ALT 7*   AST 10   ALBUMIN 2.3*   PROT 5.1*   BILITOT 1.3*         Recent Labs  Lab 04/12/18  2154 04/13/18  0741 04/15/18  1900 04/18/18  0608   POCTGLUCOSE 113* 112* 118* 136*          Imaging Results                   X-Ray Chest AP Portable (Final result)  Result time 04/10/18 21:09:26                Final result by Chivo Michael MD (04/10/18 21:09:26)                               Impression:        No acute abnormality identified.        Electronically signed by:     Chivo Michael MD  Date:                                   04/10/2018  Time:                                   21:09                         Narrative:     EXAMINATION:  XR CHEST AP PORTABLE     CLINICAL HISTORY:  Unspecified abdominal pain     TECHNIQUE:  Single frontal view of the chest was performed.     COMPARISON:  None     FINDINGS:  The lungs are clear, with normal appearance of pulmonary vasculature and no pleural effusion or pneumothorax.     The cardiac silhouette is is magnified by portable technique.  Mildly tortuous aorta with some atherosclerotic calcification in the arch.     Bones are intact.                                             Procedures: Attempted EGD (4/12)     Consultants: AES, Surgical Oncology, Hematology/Oncology, Nephrology, Cardiology, Gastroenterology, Kidney Transplant         Current Discharge Medication List           START taking these medications     Details   apixaban 5 mg Tab Take 1 tablet (5 mg total) by mouth 2 (two) times daily.       clindamycin (CLEOCIN) 150 MG capsule Take 3 capsules (450 mg total) by mouth every 6 (six) hours.  Qty: 36 capsule, Refills: 0               CONTINUE these medications which have NOT CHANGED     Details   azaTHIOprine (IMURAN) 50 mg Tab Take 50 mg by mouth once daily. Three tablets daily       diltiaZEM (CARDIZEM) 60 MG tablet Take 60 mg by  mouth 3 (three) times daily.       losartan (COZAAR) 50 MG tablet Take 50 mg by mouth once daily.       pantoprazole (PROTONIX) 40 MG tablet Take 40 mg by mouth 2 (two) times daily.       predniSONE (DELTASONE) 5 MG tablet Take 5 mg by mouth once daily. Two tablets daily       promethazine (PHENERGAN) 12.5 MG Tab Take 12.5 mg by mouth every 6 (six) hours as needed.                 Discharge Diet: none     Activity: activity as tolerated     Discharge Condition: Fair     Disposition: Hospice/Home     Tests pending at the time of discharge: none      Time spent  on the discharge of the patient including review of hospital course with the patient. reviewing discharge medications and arranging follow-up care      Discharge examination Patient was seen and examined on the date of discharge and determined to be suitable for discharge.     Discharge plan and follow up:        No future appointments.     Provider     I personally scribed for Levi Villasenor MD on 04/19/2018 at 11:36 AM. Electronically signed by konstantin Eden on 04/19/2018 at 11:36 AM.

## 2018-04-19 NOTE — PLAN OF CARE
Faxed hospice orders to [facility]. Discussed transportation with patient and spouse and their son is enroute with van to transport patient home. Communicated discharge plan with nurse.

## 2018-04-19 NOTE — NURSING
Order for TPN  at 2155 on . Pt currently on TPN at 40 ml/hr. Dr. Wild  Was notified and MD reordered for TPN to start at 1400 on .   Dr. Wild notified and MD stated he will update order for TPN for current time. Will continue to monitor.

## 2018-04-19 NOTE — PT/OT/SLP DISCHARGE
Physical Therapy Discharge Summary    Name: Elbert Connelly  MRN: [id number]   Principal Problem: Atrial fibrillation     Patient Discharged from acute Physical Therapy on 2018.  Please refer to prior PT noted date on 18 for functional status.     Assessment:     Pt and family reported to OT that they no longer wish to receive PT services. Plan is for pt to D/C home w/ hospice.     Objective:     GOALS:    Physical Therapy Goals        Problem: Physical Therapy Goal    Goal Priority Disciplines Outcome Goal Variances Interventions   Physical Therapy Goal     PT/OT, PT Ongoing (interventions implemented as appropriate)     Description:  Goals to be met by: 2018     Patient will increase functional independence with mobility by performin. Supine to sit with Contact Guard Assistance.  2. Rolling to Left and Right with Stand-by Assistance.  3. Sit to stand transfer with Contact Guard Assistance with appropriate AD.  4. Bed to chair transfer with Contact Guard Assistance with appropriate AD.  5. Gait  x 100 feet with Stand-by Assistance with appropriate AD.  6. Lower extremity exercise program x 20 reps per handout, with supervision.                      Reasons for Discontinuation of Therapy Services  Patient declines to continue care.      Plan:     Patient Discharged to: Palliative Care/Hospice.    Mary Ellen Ramirez, PT  2018

## 2018-04-19 NOTE — MEDICAL/APP STUDENT
Discharge Summary  Hospital Medicine    Attending Provider on Discharge: Dr. 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 May change pred to SM and hold  Aza for short period if unable to swallow.  -Follow with strict I/Os, daily weights, BP (goal <140/90), daily labs.    -Check immuknow cylex to better assess net IS.      Hypokalemia [E87.6]  Yes    Hypomagnesemia [E83.42]  Yes    Nonrheumatic aortic valve disorder [I35.9]  Unknown    Esophageal obstruction [K22.2]  Yes    Bilateral edema of lower extremity [R60.0]  Unknown    Dehydration [E86.0]  Unknown      Resolved Hospital Problems    Diagnosis Date Resolved POA   No resolved problems to display.        HPI  Mr. Elbert Connelly is a 67 y.o. male with medical problems including HTN, DVT/PE (IVC filter), renal disorder, and history of kidney transplant who presents with complaint of abdominal pain, fatigue, weakness, dizziness and esophageal obstruction.     Hospital Course     Esophageal obstruction  Esophageal spasm  Gastric carcnoma  - AES consult for EGD/EUS for gastric cancer  - Surgical oncology consult and appreciate recs  - Will need documentation from OSH  - Unable to  tolerate saliva but protecting airway.  - Strict NPO, aspiration precautions  - Not currently on anticoagulation- will need to be restarted on this admission  - LR mIVF x12 hours  - 4/11 - [facility]: NPO for procedure/surgery and aspiration concern, IV hydration and trend labs for electrolytes . CT thorax/abdomen/pelvis w/IV contrast for staging  - EUS tomorrow pending CT results  - SLP consult for recs re: meds/diet   - 4/12: U/S endoscopy upper cancelled for cardiovascular reasons  - 4/14: Scheduled for EGD Monday. Consider feeding tube early next week, possibly starting TPN today.   -4/16: EGD held due to afib with RVR overnight. PICC line placement today to start TPN.  dynamically stable. Replaced K and Mg.  -4/17: Started TPN today 20ml/hr. Started morphine IV PRN for pain relief. AES rec: EGD outpatient in 2 weeks once medically optimized. Heme/Onc Consult.  -4/18: Heme/Onc recs: not a candidate for chemotherapy due to nutritional status and not a candidate for immunotherapy due to hx of renal transplant. May possibly benefit from J-tube, recommend consulting Surg Onc. TPN to increase to 40ml/hr.    -4/19: Patient and family decided on Home Hospice. Discontinued TPN and made DNR.      Cellulitis LLE  -LLE erythema and warmth medially. Started on clindamycin 600mg q8hrs on 4/13.      Hypomagnesemia  -1.3 (4/11)-->1.6 (4/13) monitor  -4/16 Mg 1.5-replaced  -4/17 1.5-replaced  -4/18 1.6-replaced     Hypokalemia  -4/16 K-3.1-replaced  -4/17 3.0-replaced  -4/18 3.4-replaced     Atrial Fibrillation  -Rate controlled  -Cardiology consulted: repeat ECHO when deemed appropriate,   -OSH records show pre-existing LBBB, no further evaluation needed.  -4/16: run of afib with RVR overnight, controlled with lopressor and one dose digoxin.     CKD Stage II (mild)  S/P kidney transplant (Stable)  - One functioning transplanted kidney  - KTM consult  - Avoid nephrotoxic agents, including contrast  - will defer abdominal imaging  decision for cancer to KTM/surg-onc team  - Cr. 0.9, lytes WNL  - Mag/Phos   - Will change prednisone 10 mg PO daily to soludmedrol 10 mg IV daily while unable to tolerate PO  - Wife able to give patient imuran 150 mg PO daily crushed up in pudding at home.  No IV available, per pharmacy. Continue PO Imuran as long as patient can tolerate.  - 4/11 - [facility]: Stable functioning kidney Cr .7 BUN 14, continue PO Imuran as long as tolerated.   -4/17 Per nephrology: Cylex ordered and pending, CMV ordered for leukopenia     History of DVT (deep vein thrombosis)  H/o DVT and PE  - Currently not on AC.  Was on warfarin for years, but switched to eliquis.  Eliquis recently stopped d/t inability to tolerate PO.  Will need to be restarted on eliquis after EGD/surgical intreventions.    Patient does not know dosing.   - IVC filter currently in place  - Currently in hypercoagulable state  - 4/18: started lovenox as no procedures planned this admission.      Dehydration  -2/2 esophageal obstruction  - IVF replacement     Chronic Bilateral edema of lower extremity  - BNP 65 in setting of obesity  - will order 2D ECHO: EF 60-65%. Normal systolic and diastolic function.   - no erythema, swelling appears equal     Weakness  - PT/OT consult     Hypertension  - Stable  - Continue losartan 50 mg PO daily, cardizem 60 mg PO TID when able to tolerate PO    Recent Labs  Lab 04/17/18  0608 04/18/18  0511 04/19/18  0430   WBC 3.62* 3.63* 3.87*   HGB 9.9* 9.3* 9.2*   HCT 29.0* 27.8* 27.5*    143* 152       Recent Labs  Lab 04/16/18  0552 04/17/18  0608 04/18/18  0511 04/18/18  0620 04/19/18  0430    138 137 137 138  138   K 3.1* 3.0* 3.8 3.4* 3.6  3.6    99 99 100 102  102   CO2 25 32* 29 30* 29  29   BUN 5* 5* 5* 5* 7*  7*   CREATININE 0.6 0.6 0.7 0.7 0.6  0.6   * 139* 451* 131* 122*  122*   CALCIUM 7.9* 8.0* 8.1* 8.5* 8.1*  8.1*   MG 1.5* 1.5* 1.6  --  1.7   PHOS 3.0  --  3.5  --  2.6*       Recent Labs  Lab  04/19/18  0430   ALKPHOS 41*   ALT 7*   AST 10   ALBUMIN 2.3*   PROT 5.1*   BILITOT 1.3*        Recent Labs  Lab 04/12/18  2154 04/13/18  0741 04/15/18  1900 04/18/18  0608   POCTGLUCOSE 113* 112* 118* 136*     Imaging Results          X-Ray Chest AP Portable (Final result)  Result time 04/10/18 21:09:26    Final result by Chivo Michael MD (04/10/18 21:09:26)                 Impression:      No acute abnormality identified.      Electronically signed by: Chivo Michael MD  Date:    04/10/2018  Time:    21:09             Narrative:    EXAMINATION:  XR CHEST AP PORTABLE    CLINICAL HISTORY:  Unspecified abdominal pain    TECHNIQUE:  Single frontal view of the chest was performed.    COMPARISON:  None    FINDINGS:  The lungs are clear, with normal appearance of pulmonary vasculature and no pleural effusion or pneumothorax.    The cardiac silhouette is is magnified by portable technique.  Mildly tortuous aorta with some atherosclerotic calcification in the arch.    Bones are intact.                                Procedures: Attempted EGD (4/12)    Consultants: AES, Surgical Oncology, Hematology/Oncology, Nephrology, Cardiology, Gastroenterology, Kidney Transplant    Current Discharge Medication List      START taking these medications    Details   apixaban 5 mg Tab Take 1 tablet (5 mg total) by mouth 2 (two) times daily.      clindamycin (CLEOCIN) 150 MG capsule Take 3 capsules (450 mg total) by mouth every 6 (six) hours.  Qty: 36 capsule, Refills: 0         CONTINUE these medications which have NOT CHANGED    Details   azaTHIOprine (IMURAN) 50 mg Tab Take 50 mg by mouth once daily. Three tablets daily      diltiaZEM (CARDIZEM) 60 MG tablet Take 60 mg by mouth 3 (three) times daily.      losartan (COZAAR) 50 MG tablet Take 50 mg by mouth once daily.      pantoprazole (PROTONIX) 40 MG tablet Take 40 mg by mouth 2 (two) times daily.      predniSONE (DELTASONE) 5 MG tablet Take 5 mg by mouth once daily. Two tablets  daily      promethazine (PHENERGAN) 12.5 MG Tab Take 12.5 mg by mouth every 6 (six) hours as needed.             Discharge Diet: none    Activity: activity as tolerated    Discharge Condition: Fair    Disposition: Hospice/Home    Tests pending at the time of discharge: none      Time spent  on the discharge of the patient including review of hospital course with the patient. reviewing discharge medications and arranging follow-up care     Discharge examination Patient was seen and examined on the date of discharge and determined to be suitable for discharge.    Discharge plan and follow up:      No future appointments.    Provider    I personally scribed for Levi Villasenor MD on 04/19/2018 at 11:36 AM. Electronically signed by konstantin Eden on 04/19/2018 at 11:36 AM.

## 2018-04-19 NOTE — PLAN OF CARE
[facility]     Department of Hospital Medicine     [address] [place] [address]     [phone] after hours  [phone] fax                                   HOME HOSPICE  ORDERS     Patient Name: Elbert Connelly  YOB: [birth year]    Admit to Hospice:  Home Service    Diagnoses:  Active Hospital Problems    Diagnosis  POA    *Atrial fibrillation [I48.91]  Unknown    Thrombocytopenia, unspecified [D69.6]  Yes    Protein-calorie malnutrition [E46]  Yes    Hypophosphatemia [E83.39]  Yes    Gastric carcinoma [C16.9]  Unknown    Weakness [R53.1]  Unknown    Esophageal spasm [K22.4]  Unknown    History of DVT (deep vein thrombosis) [Z86.718]  Not Applicable    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 May change pred to SM and hold  Aza for short period if unable to swallow.  -Follow with strict I/Os, daily weights, BP (goal <140/90), daily labs.    -Check immuknow cylex to better assess net IS.      Hypokalemia [E87.6]  Yes    Hypomagnesemia [E83.42]  Yes    Nonrheumatic aortic valve disorder [I35.9]  Unknown    Esophageal obstruction [K22.2]  Yes    Bilateral edema of lower extremity [R60.0]  Unknown    Dehydration [E86.0]  Unknown      Resolved Hospital Problems    Diagnosis Date Resolved POA   No resolved problems to display.       Hospice Qualifying Diagnoses: GASTRIC CARCINOMA       Patient has a life expectancy < 6 months due to these conditions.    Vital Signs: Routine per Hospice Protocol.    Allergies:  Review of patient's allergies indicates:   Allergen Reactions    Allopurinol analogues        Diet:NPO, suction as needed     Activities: activity as  tolerated    Nursing: Per Hospice Routine    Future Orders:  Hospice Medical Director may dictate new orders for comfortable care measures & sign death certificate.    Medications:         Comfort Care Medications Only     Elbert Connelly   Home Medication Instructions TONY:[id number]    Printed on:04/19/18 1111   Medication Information                      apixaban 5 mg Tab  Take 1 tablet (5 mg total) by mouth 2 (two) times daily.             azaTHIOprine (IMURAN) 50 mg Tab  Take 50 mg by mouth once daily. Three tablets daily             clindamycin (CLEOCIN) 150 MG capsule  Take 3 capsules (450 mg total) by mouth every 6 (six) hours.             diltiaZEM (CARDIZEM) 60 MG tablet  Take 60 mg by mouth 3 (three) times daily.             losartan (COZAAR) 50 MG tablet  Take 50 mg by mouth once daily.             pantoprazole (PROTONIX) 40 MG tablet  Take 40 mg by mouth 2 (two) times daily.             predniSONE (DELTASONE) 5 MG tablet  Take 5 mg by mouth once daily. Two tablets daily             promethazine (PHENERGAN) 12.5 MG Tab  Take 12.5 mg by mouth every 6 (six) hours as needed.                 CODE STATUS - DNR    DIABETES CARE: N/A        _________________________________  Levi Villasenor MD  04/19/2018

## 2018-04-19 NOTE — PHYSICIAN QUERY
PT Name: Elbert Connelly  MR #: [id number]     Physician Query Form - Documentation Clarification      CDS/: Paige Hairston RN, CCDS               Contact information:  [email or web address]          This form is a permanent document in the medical record.     Query Date: April 19, 2018    By submitting this query, we are merely seeking further clarification of documentation. Please utilize your independent clinical judgment when addressing the question(s) below.    The Medical record reflects the following:    Supporting Clinical Findings Location in Medical Record   Last week pt had EGD and biopsies taken at that appointment showed stomach cancer.     Esophageal obstruction  Esophageal spasm  Gastric carcnoma  Dysphagia x3 months with 4 EGDs without relief of symptoms.  Biopsies taken during previous EGD showed gastric carcinoma.  D/c Sunday from [facility] and told to f/u with Dr. Fermin (GI).  No previous documentation or procedures here.   - AES consult for EGD/EUS for gastric cancer  - Surgical oncology consult and appreciate recs  - Will need documentation from OSH  - Unable to tolerate saliva but protecting airway.  - Strict NPO, aspiration precautions  - Not currently on anticoagulation- will need to be restarted on this admission  - LR mIVF x12 hours    Recently underwent an EGD on 4/6/18 which showed a dilated proximal esophagus with a distal ring/stricture, diffuse gastric edema/erythema with biopsies positive for gastric adenocarcinoma with infiltration of the lamina propria.      Gastric carcinoma  Recent biopsies positive for adenocarcinoma of the gastric body.  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    Gastric carcinoma  68yo male with biopsy positive adenocarcinoma of the stomach and thickening of the distal esophagus   - NPO for EUS today, CT with concerns for lymph node metastases along lesser curve and diffuse thickening of distal  esophagus, will follow up results for surgical planning.     Esophageal obstruction  Esophageal spasm  Gastric [facility]    [facility]                            Gastroenterology consult 04/11        Gastroenterology consult 04/11        General Surgery consult 04/12            Discharge Summary                                                                                        Doctor, Please specify diagnosis or diagnoses associated with above clinical findings.  Please specify the known or suspected etiology of esophageal obstruction.    Provider Use Only           gastric adenocarcinoma                                                                                                                     [  ] Clinically undetermined

## 2018-04-19 NOTE — NURSING
Pt d/c home per MD order. Telemetry removed. Telemetry room notified. PICC IV access removed and intact x1. VSS. No distress noted. No complaints noted at this time. Pt given and explained medication list and prescriptions. Pt verbalizes complete understanding of all discharge instructions and follow up care. Pt given printed AVS. Sign, copied, placed, and charted.  Family at bedside.  Awaiting escort and for home hospice. Will continue to monitor.

## 2018-04-20 PROBLEM — K22.4 ESOPHAGEAL SPASM: Status: ACTIVE | Noted: 2018-04-20

## 2018-04-20 PROBLEM — R60.0 BILATERAL EDEMA OF LOWER EXTREMITY: Status: ACTIVE | Noted: 2018-04-20

## 2018-04-20 PROBLEM — I48.91 ATRIAL FIBRILLATION: Status: ACTIVE | Noted: 2018-04-20

## 2018-04-20 PROBLEM — I35.9 NONRHEUMATIC AORTIC VALVE DISORDER: Status: ACTIVE | Noted: 2018-04-20

## 2018-04-20 PROBLEM — E86.0 DEHYDRATION: Status: ACTIVE | Noted: 2018-04-20

## 2018-04-20 PROBLEM — R53.1 WEAKNESS: Status: ACTIVE | Noted: 2018-04-20

## 2018-04-20 PROBLEM — C16.9 GASTRIC CARCINOMA: Status: ACTIVE | Noted: 2018-04-20

## 2018-04-20 PROBLEM — I10 HYPERTENSION: Status: ACTIVE | Noted: 2018-04-20

## 2019-10-16 NOTE — ASSESSMENT & PLAN NOTE
-Corrected. Some being added to IVF (LR). Follow lab closely.      Alert and oriented, no focal deficits, no motor or sensory deficits.

## 2021-07-29 NOTE — PROGRESS NOTES
Report called to ЕЛЕНА Bundy who will assume pt care in room 326. Will continue to monitor.   [FreeTextEntry1] : I, Antonieta Matos wrote this note acting as a scribe for Dr. Suhail Irving on Jul 29, 2021.\par